# Patient Record
Sex: FEMALE | Race: BLACK OR AFRICAN AMERICAN | Employment: OTHER | URBAN - METROPOLITAN AREA
[De-identification: names, ages, dates, MRNs, and addresses within clinical notes are randomized per-mention and may not be internally consistent; named-entity substitution may affect disease eponyms.]

---

## 2017-01-10 ENCOUNTER — DOCUMENTATION ONLY (OUTPATIENT)
Dept: FAMILY MEDICINE CLINIC | Facility: CLINIC | Age: 67
End: 2017-01-10

## 2017-01-10 ENCOUNTER — PATIENT OUTREACH (OUTPATIENT)
Dept: FAMILY MEDICINE CLINIC | Facility: CLINIC | Age: 67
End: 2017-01-10

## 2017-01-10 NOTE — PROGRESS NOTES
This note will not be viewable in [address] I spoke with the Pt on 1/10/2017. Ms. Gina Nguyen stated that she needed to find somewhere for her and her  because her daughter n law and son stated to them that they needed to be out their home in February. She had stated that she called her insurance to see what she needed to do to get her  in rehab while she was in the hospital during her surgery because he is unable to take care of himself. ( he is paralyzed on his left side) . Mrs. Kramer stated the insurance company told her that she would have to pay out of pocket, she also stated she did not want her son to know what she was doing. Mrs. Jean Claude Hess stated that her son and daughter had sold all of her furniture from her home in [place] and they made her put her home up for sell with she was hospitalized earlier in the year of 2016. She stated she would have not came here if she knew her son was like the way he is. Mrs. Jean Claude Hess was whispering the entire time on the phone as if someone was listening in on her phone conversation. I asked Mrs. Kramer if she needed me to call someone for her? She stated\"  She has a daughter who lives in [facility] that doesn't answer her phone for her.

## 2017-01-10 NOTE — PROGRESS NOTES
This note will not be viewable in [address] Amada Foster nurse informed me(writer) that the patient has been calling the office in  regards to patient's concern for housing. Per Amada, patient and patient's  are currently living with their son and daughter in law. Per Amada, Patient's daughter in law attempted to sell patient's house and furniture in [place]. Per Amada, patient states that patient's son told patient that they have until the end of the month to find a place to stay. Nurse Amada informed me (writer) that patient's son verbally told her (Amada)  that his parents need to move out of patient's son house. LiamOneCubicleward Stahl  informed writer that daughter in law sold all patient's furniture in [place]. Per Amada, patient has an upcoming procedure and the patient is worried about her  who is bed bound and can't care for himself. Kate informed me (writer) that the patient and patient's son called the office today 1/10/17. Per Kate, patient son states that he is aware that her mom has been calling the office. Lisa Solorzano informed me that patient's son told her that he is willing to care for his mom and dad until mother recovers from her upcoming surgery. Pepito Kenzie also informed me that patient's son told her that patients and patient's  needs move out of patient's son's house once patient recovers from surgery. Called Adult MAYKEL Salguero Twice. Left a voice message with office contact information. Received call from patient. Patient verified her identity using her full name and date of birth. Introduce self and role to patient. I asked the patient how she is feeling today and if there is anything I can help her. Patient states that she is okay. Patient states that she is safe. I asked the patient if she needs to be seen sooner or need sooner appointment. Patient states that she will keep her upcoming appt on 1/13/17.  Patient verbalized no other concerns at this time. Notified by Giovani Alex that patient's son walked in the office today. Patient's son was asking if her mother called in the office to apologize. Awaiting call from LEE ANN.

## 2017-01-11 ENCOUNTER — PATIENT OUTREACH (OUTPATIENT)
Dept: FAMILY MEDICINE CLINIC | Facility: CLINIC | Age: 67
End: 2017-01-11

## 2017-01-11 NOTE — PROGRESS NOTES
This note will not be viewable in [address] Received call from [facility]  from [facility]nan [facility] ([phone]) today 1/11/17. Relayed all information received from Nurse Holt,  [address] [place] [address] and [facility]. Opportunity to ask questions was provided. Contact information was provided for future reference, assistance, concerns, or further questions.

## 2017-01-12 DIAGNOSIS — M17.11 PRIMARY OSTEOARTHRITIS OF RIGHT KNEE: ICD-10-CM

## 2017-01-13 DIAGNOSIS — E11.9 CONTROLLED TYPE 2 DIABETES MELLITUS WITHOUT COMPLICATION, WITHOUT LONG-TERM CURRENT USE OF INSULIN (HCC): ICD-10-CM

## 2017-01-16 ENCOUNTER — HOSPITAL ENCOUNTER (OUTPATIENT)
Dept: LAB | Age: 67
Discharge: HOME | End: 2017-01-16
Payer: MEDICARE

## 2017-01-16 DIAGNOSIS — I10 ESSENTIAL HYPERTENSION WITH GOAL BLOOD PRESSURE LESS THAN 140/90: ICD-10-CM

## 2017-01-16 DIAGNOSIS — E11.9 CONTROLLED TYPE 2 DIABETES MELLITUS WITHOUT COMPLICATION, WITHOUT LONG-TERM CURRENT USE OF INSULIN (HCC): ICD-10-CM

## 2017-01-16 LAB
ALBUMIN SERPL BCP-MCNC: 3.7 G/DL (ref 3.4–5)
ALBUMIN/GLOB SERPL: 0.9 {RATIO} (ref 0.8–1.7)
ALP SERPL-CCNC: 58 U/L (ref 45–117)
ALT SERPL-CCNC: 18 U/L (ref 13–56)
ANION GAP BLD CALC-SCNC: 7 MMOL/L (ref 3–18)
AST SERPL W P-5'-P-CCNC: 22 U/L (ref 15–37)
BILIRUB SERPL-MCNC: 0.6 MG/DL (ref 0.2–1)
BUN SERPL-MCNC: 15 MG/DL (ref 7–18)
BUN/CREAT SERPL: 13 (ref 12–20)
CALCIUM SERPL-MCNC: 9.5 MG/DL (ref 8.5–10.1)
CHLORIDE SERPL-SCNC: 100 MMOL/L (ref 100–108)
CO2 SERPL-SCNC: 34 MMOL/L (ref 21–32)
CREAT SERPL-MCNC: 1.13 MG/DL (ref 0.6–1.3)
EST. AVERAGE GLUCOSE BLD GHB EST-MCNC: 171 MG/DL
GLOBULIN SER CALC-MCNC: 4.1 G/DL (ref 2–4)
GLUCOSE SERPL-MCNC: 122 MG/DL (ref 74–99)
HBA1C MFR BLD: 7.6 % (ref 4.2–5.6)
POTASSIUM SERPL-SCNC: 3.9 MMOL/L (ref 3.5–5.5)
PROT SERPL-MCNC: 7.8 G/DL (ref 6.4–8.2)
SODIUM SERPL-SCNC: 141 MMOL/L (ref 136–145)

## 2017-01-16 PROCEDURE — 36415 COLL VENOUS BLD VENIPUNCTURE: CPT | Performed by: PHYSICIAN ASSISTANT

## 2017-01-16 PROCEDURE — 80053 COMPREHEN METABOLIC PANEL: CPT | Performed by: PHYSICIAN ASSISTANT

## 2017-01-16 PROCEDURE — 83036 HEMOGLOBIN GLYCOSYLATED A1C: CPT | Performed by: PHYSICIAN ASSISTANT

## 2017-01-19 ENCOUNTER — TELEPHONE (OUTPATIENT)
Dept: FAMILY MEDICINE CLINIC | Facility: CLINIC | Age: 67
End: 2017-01-19

## 2017-01-19 DIAGNOSIS — M23.91 ARTICULAR CARTILAGE DISORDER OF RIGHT KNEE: ICD-10-CM

## 2017-01-19 RX ORDER — TRAMADOL HYDROCHLORIDE 50 MG/1
50 TABLET ORAL
Qty: 120 TAB | Refills: 0 | OUTPATIENT
Start: 2017-01-19

## 2017-01-19 NOTE — TELEPHONE ENCOUNTER
----- Message from Prabha Zapata, [address] sent at 1/17/2017  7:49 AM EST -----  These are labs for patient you see next week

## 2017-01-19 NOTE — TELEPHONE ENCOUNTER
Spoke with Donya Lopez, Verified 2 patient identifiers. Spoke with patient in regards to lab results. Relayed Doctor's notes.   Patient acknowledges understanding and voices no further questions or concerns at this time

## 2017-01-20 NOTE — TELEPHONE ENCOUNTER
Patient has been getting this medication from Dr Mckay Jean and they called his office yesterday for a refill and was told that she needed to be seen again prior to any refills. Ashley's office LMOVM telling patient to call for an appointment.

## 2017-01-20 NOTE — TELEPHONE ENCOUNTER
Called left message that her pharmacy sent of a request for her tramadol and at this time Dr. Reji Salazar has refused to refill it because she needs an appointment left the number for her to call to set it up at [facility]

## 2017-01-20 NOTE — TELEPHONE ENCOUNTER
Patient's son Long Flores is calling in for a refill of Ultram. Dr. Sandrine Portillo DID NOT prescribe this but she is completely out and no longer under the care of the prescriber. Patient's son Mr. Johnie Prader can be reached at [phone]. If called in please call in to [facility] on [address] [phone].

## 2017-01-23 NOTE — TELEPHONE ENCOUNTER
Rx pended to Kelsea Lee PA-C and called to pharmacy. Called patient and advised this was completed. Mr. Tanya Jeffery states the patient has been quite mean and aggressive lately and has a follow up appointment with her PCP on 1/26/17. He also states that she has decided to move back to [place] and is opting to have her surgery done there instead.

## 2017-01-24 RX ORDER — TRAMADOL HYDROCHLORIDE 50 MG/1
TABLET ORAL
Qty: 60 TAB | Refills: 0 | Status: SHIPPED | OUTPATIENT
Start: 2017-01-24 | End: 2017-01-25

## 2017-01-25 ENCOUNTER — OFFICE VISIT (OUTPATIENT)
Dept: FAMILY MEDICINE CLINIC | Facility: CLINIC | Age: 67
End: 2017-01-25

## 2017-01-25 VITALS
DIASTOLIC BLOOD PRESSURE: 78 MMHG | OXYGEN SATURATION: 98 % | RESPIRATION RATE: 17 BRPM | BODY MASS INDEX: 31.58 KG/M2 | HEART RATE: 59 BPM | HEIGHT: 64 IN | SYSTOLIC BLOOD PRESSURE: 128 MMHG | TEMPERATURE: 96.6 F | WEIGHT: 185 LBS

## 2017-01-25 DIAGNOSIS — E66.9 OBESITY, CLASS I, BMI 30-34.9: ICD-10-CM

## 2017-01-25 DIAGNOSIS — E11.9 CONTROLLED TYPE 2 DIABETES MELLITUS WITHOUT COMPLICATION, WITHOUT LONG-TERM CURRENT USE OF INSULIN (HCC): Primary | ICD-10-CM

## 2017-01-25 DIAGNOSIS — M19.90 ARTHRITIS: ICD-10-CM

## 2017-01-25 DIAGNOSIS — I10 ESSENTIAL HYPERTENSION WITH GOAL BLOOD PRESSURE LESS THAN 140/90: ICD-10-CM

## 2017-01-25 DIAGNOSIS — Z13.31 SCREENING FOR DEPRESSION: ICD-10-CM

## 2017-01-25 RX ORDER — METFORMIN HYDROCHLORIDE 500 MG/1
750 TABLET ORAL 2 TIMES DAILY WITH MEALS
Qty: 180 TAB | Refills: 3 | Status: SHIPPED | OUTPATIENT
Start: 2017-01-25

## 2017-01-25 NOTE — PROGRESS NOTES
Internal Medicine Progress Note    Today's Date:  2017   Patient:  Kip Cabrera  Patient :  1950    Subjective:     Chief Complaint   Patient presents with    Hypertension    Diabetes      Diabetes mellitus  This is a chronic problem. BS is not at goal. Pt is on metformin. Pt is not on aspirin due to h/o GI bleed.  Pt is on a statin.       Hypertension   This is a chronic problem. BP is at goal. Pt takes norvasc, toprol and valsartan. Pt reports compliance with these medications. Bilateral knee arthritis  This is a chronic problem. This is not at goal. Pt is scheduled for knee replacement in February. Pt takes tramadol. Gout   This is a chronic problem. This is not controlled. This is present in her right hand. Pt is taking colchicine and uloric. Allopurinol caused swelling.      Past Medical History   Diagnosis Date    Advance directive discussed with patient 2016    Aspirin intolerance 2017    Asthma      bronchitis    Cancer ([address] 1998     Left breast cancer    Cervical radiculopathy     Cigarette nicotine dependence in remission 2016    DDD (degenerative disc disease), lumbar     Degeneration of cervical intervertebral disc     Degeneration of thoracic intervertebral disc     Diabetes mellitus ([address]      type 2    Diabetes mellitus type 2, controlled ([address] 2016    Essential hypertension with goal blood pressure less than 140/90 8/10/2016    GERD (gastroesophageal reflux disease)     GI bleed 16    Gout     H. pylori infection 2016    H/O: GI bleed 2017    Hammer toe of left foot     Hypercholesterolemia     Hypertension     Lumbago with sciatica     Lumbar radiculopathy     Malignant neoplasm of left female breast ([address] 2016    Mixed incontinence 8/10/2016    Obesity, Class I, BMI 30-34.9 2016    osteoarthritis     Osteoarthrosis, generalized, involving multiple sites     Osteoporosis     Paronychia of finger     Primary insomnia 8/10/2016    Sciatica of left side     Scoliosis     Segmental and somatic dysfunction of lumbar region     Segmental and somatic dysfunction of pelvic region     Urinary tract infection     Vertigo     Vitamin B12 deficiency     Vitamin D deficiency     Wound infection      Past Surgical History   Procedure Laterality Date    Hx tubal ligation Bilateral     Hx mastectomy Left 1998      reports that she quit smoking about 41 years ago. She has a 1.25 pack-year smoking history. She has never used smokeless tobacco. She reports that she does not drink alcohol or use illicit drugs. Family History   Problem Relation Age of Onset    Hypertension Mother     Hypertension Father      Allergies   Allergen Reactions    Nsaids (Non-Steroidal Anti-Inflammatory Drug) Other (comments)     GI Bleed    Aspirin Other (comments)     Gi bleed       Review of Systems   Positives in bold  CV:      chest pain, palpitations  PULM:  SOB, wheezing, cough, sputum production    Current Outpatient Meds and Allergies     Current Outpatient Prescriptions on File Prior to Visit   Medication Sig Dispense Refill    traMADol (ULTRAM) 50 mg tablet Take 1 Tab by mouth every six (6) hours as needed for Pain. Max Daily Amount: 200 mg. 120 Tab 0    CRANBERRY FRUIT EXTRACT (CRANBERRY CONCENTRATE PO) Take 2 Tabs by mouth daily.  amLODIPine (NORVASC) 10 mg tablet Take 1 Tab by mouth daily. 90 Tab 3    valsartan (DIOVAN) 160 mg tablet Take 1 Tab by mouth every evening. 90 Tab 3    metoprolol succinate (TOPROL-XL) 50 mg XL tablet Take 1 Tab by mouth daily. 90 Tab 3    febuxostat (ULORIC) 40 mg tab tablet Take 1 Tab by mouth daily. 90 Tab 3    fexofenadine (ALLEGRA) 180 mg tablet Take 1 Tab by mouth daily. (Patient taking differently: Take 180 mg by mouth daily as needed.) 90 Tab 3    colchicine 0.6 mg tablet Take 1 Tab by mouth daily.  90 Tab 0    oxybutynin chloride XL (DITROPAN XL) 10 mg CR tablet Take 1 Tab by mouth daily. 90 Tab 3    melatonin 3 mg tablet Take 1 Tab by mouth nightly. 90 Tab 3    pravastatin (PRAVACHOL) 20 mg tablet Take 1 Tab by mouth nightly. 90 Tab 3    pantoprazole (PROTONIX) 40 mg tablet Take 40 mg by mouth daily as needed.  senna (SENOKOT) 8.6 mg tablet Take 2 Tabs by mouth nightly. No current facility-administered medications on file prior to visit. Allergies   Allergen Reactions    Nsaids (Non-Steroidal Anti-Inflammatory Drug) Other (comments)     GI Bleed    Aspirin Other (comments)     Gi bleed       Objective:     VS:    Visit Vitals    /78 (BP 1 Location: Right arm, BP Patient Position: Sitting)  Comment (BP 1 Location): RIGHT    Pulse (!) 59    Temp 96.6 °F (35.9 °C) (Oral)    Resp 17    Ht 5' 4\" (1.626 m)    Wt 185 lb (83.9 kg)    SpO2 98%    BMI 31.76 kg/m2     General:   Well-nourished, well-groomed, pleasant, alert, in no acute distress  Head:  Normocephalic, atraumatic  Ears:  External ears WNL  Nose:  External nares WNL  Psych:  No pressured speech, no abnormal thought content    Hospital Outpatient Visit on 01/16/2017   Component Date Value Ref Range Status    Hemoglobin A1c 01/16/2017 7.6* 4.2 - 5.6 % Final    Comment: (NOTE)  HbA1C Interpretive Ranges  <5.7              Normal  5.7 - 6.4         Consider Prediabetes  >6.5              Consider Diabetes      Est. average glucose 01/16/2017 171  mg/dL Final    Comment: (NOTE)  The eAG should be interpreted with patient characteristics in mind   since ethnicity, interindividual differences, red cell lifespan,   variation in rates of glycation, etc. may affect the validity of the   calculation.       Sodium 01/16/2017 141  136 - 145 mmol/L Final    Potassium 01/16/2017 3.9  3.5 - 5.5 mmol/L Final    Chloride 01/16/2017 100  100 - 108 mmol/L Final    CO2 01/16/2017 34* 21 - 32 mmol/L Final    Anion gap 01/16/2017 7  3.0 - 18 mmol/L Final    Glucose 01/16/2017 122* 74 - 99 mg/dL Final    BUN 01/16/2017 15  7.0 - 18 MG/DL Final    Creatinine 01/16/2017 1.13  0.6 - 1.3 MG/DL Final    BUN/Creatinine ratio 01/16/2017 13  12 - 20   Final    GFR est AA 01/16/2017 58* >60 ml/min/1.73m2 Final    GFR est non-AA 01/16/2017 48* >60 ml/min/1.73m2 Final    Comment: (NOTE)  Estimated GFR is calculated using the Modification of Diet in Renal   Disease (MDRD) Study equation, reported for both  Americans   (GFRAA) and non- Americans (GFRNA), and normalized to 1.73m2   body surface area. The physician must decide which value applies to   the patient. The MDRD study equation should only be used in   individuals age 25 or older. It has not been validated for the   following: pregnant women, patients with serious comorbid conditions,   or on certain medications, or persons with extremes of body size,   muscle mass, or nutritional status.  Calcium 01/16/2017 9.5  8.5 - 10.1 MG/DL Final    Bilirubin, total 01/16/2017 0.6  0.2 - 1.0 MG/DL Final    ALT 01/16/2017 18  13 - 56 U/L Final    AST 01/16/2017 22  15 - 37 U/L Final    Alk.  phosphatase 01/16/2017 58  45 - 117 U/L Final    Protein, total 01/16/2017 7.8  6.4 - 8.2 g/dL Final    Albumin 01/16/2017 3.7  3.4 - 5.0 g/dL Final    Globulin 01/16/2017 4.1* 2.0 - 4.0 g/dL Final    A-G Ratio 01/16/2017 0.9  0.8 - 1.7   Final   Hospital Outpatient Visit on 12/16/2016   Component Date Value Ref Range Status    Ventricular Rate 12/16/2016 56  BPM Final    Atrial Rate 12/16/2016 56  BPM Final    P-R Interval 12/16/2016 184  ms Final    QRS Duration 12/16/2016 84  ms Final    Q-T Interval 12/16/2016 430  ms Final    QTC Calculation (Bezet) 12/16/2016 414  ms Final    Calculated P Axis 12/16/2016 77  degrees Final    Calculated R Axis 12/16/2016 20  degrees Final    Calculated T Axis 12/16/2016 97  degrees Final    Diagnosis 12/16/2016    Final                    Value:Sinus bradycardia  Nonspecific T wave abnormality  Abnormal ECG  No previous ECGs available  Confirmed by Chica Espinoza ([id number]) on 12/16/2016 4:08:53 PM     Hospital Outpatient Visit on 12/16/2016   Component Date Value Ref Range Status    Prothrombin time 12/16/2016 12.6  11.5 - 15.2 sec Final    INR 12/16/2016 1.0  0.8 - 1.2   Final    Comment:            INR Therapeutic Ranges         (on stable oral anticoagulant):     INDICATION                INR  DVT/PE/Atrial Fib          2.0-3.0  MI/Mechanical Heart Valve  2.5-3.5      aPTT 12/16/2016 28.0  23.0 - 36.4 SEC Final    WBC 12/16/2016 5.0  4.6 - 13.2 K/uL Final    RBC 12/16/2016 4.95  4.20 - 5.30 M/uL Final    HGB 12/16/2016 14.0  12.0 - 16.0 g/dL Final    HCT 12/16/2016 43.3  35.0 - 45.0 % Final    MCV 12/16/2016 87.5  74.0 - 97.0 FL Final    MCH 12/16/2016 28.3  24.0 - 34.0 PG Final    MCHC 12/16/2016 32.3  31.0 - 37.0 g/dL Final    RDW 12/16/2016 16.8* 11.6 - 14.5 % Final    PLATELET 07/10/7283 699  135 - 420 K/uL Final    MPV 12/16/2016 11.1  9.2 - 11.8 FL Final    NEUTROPHILS 12/16/2016 46  40 - 73 % Final    LYMPHOCYTES 12/16/2016 43  21 - 52 % Final    MONOCYTES 12/16/2016 8  3 - 10 % Final    EOSINOPHILS 12/16/2016 3  0 - 5 % Final    BASOPHILS 12/16/2016 0  0 - 2 % Final    ABS. NEUTROPHILS 12/16/2016 2.2  1.8 - 8.0 K/UL Final    ABS. LYMPHOCYTES 12/16/2016 2.2  0.9 - 3.6 K/UL Final    ABS. MONOCYTES 12/16/2016 0.4  0.05 - 1.2 K/UL Final    ABS. EOSINOPHILS 12/16/2016 0.2  0.0 - 0.4 K/UL Final    ABS.  BASOPHILS 12/16/2016 0.0  0.0 - 0.06 K/UL Final    DF 12/16/2016 AUTOMATED    Final    Sodium 12/16/2016 142  136 - 145 mmol/L Final    Potassium 12/16/2016 4.2  3.5 - 5.5 mmol/L Final    Chloride 12/16/2016 100  100 - 108 mmol/L Final    CO2 12/16/2016 34* 21 - 32 mmol/L Final    Anion gap 12/16/2016 8  3.0 - 18 mmol/L Final    Glucose 12/16/2016 132* 74 - 99 mg/dL Final    BUN 12/16/2016 17  7.0 - 18 MG/DL Final    Creatinine 12/16/2016 0.99  0.6 - 1.3 MG/DL Final    BUN/Creatinine ratio 12/16/2016 17  12 - 20   Final    GFR est AA 12/16/2016 >60  >60 ml/min/1.73m2 Final    GFR est non-AA 12/16/2016 56* >60 ml/min/1.73m2 Final    Comment: (NOTE)  Estimated GFR is calculated using the Modification of Diet in Renal   Disease (MDRD) Study equation, reported for both  Americans   (GFRAA) and non- Americans (GFRNA), and normalized to 1.73m2   body surface area. The physician must decide which value applies to   the patient. The MDRD study equation should only be used in   individuals age 25 or older. It has not been validated for the   following: pregnant women, patients with serious comorbid conditions,   or on certain medications, or persons with extremes of body size,   muscle mass, or nutritional status.  Calcium 12/16/2016 9.2  8.5 - 10.1 MG/DL Final    Bilirubin, total 12/16/2016 0.6  0.2 - 1.0 MG/DL Final    ALT 12/16/2016 19  13 - 56 U/L Final    AST 12/16/2016 22  15 - 37 U/L Final    Alk.  phosphatase 12/16/2016 74  45 - 117 U/L Final    Protein, total 12/16/2016 8.0  6.4 - 8.2 g/dL Final    Albumin 12/16/2016 3.7  3.4 - 5.0 g/dL Final    Globulin 12/16/2016 4.3* 2.0 - 4.0 g/dL Final    A-G Ratio 12/16/2016 0.9  0.8 - 1.7   Final    Color 12/16/2016 YELLOW    Final    Appearance 12/16/2016 CLEAR    Final    Specific gravity 12/16/2016 1.010  1.005 - 1.030   Final    pH (UA) 12/16/2016 7.0  5.0 - 8.0   Final    Protein 12/16/2016 NEGATIVE   NEG mg/dL Final    Glucose 12/16/2016 NEGATIVE   NEG mg/dL Final    Ketone 12/16/2016 NEGATIVE   NEG mg/dL Final    Bilirubin 12/16/2016 NEGATIVE   NEG   Final    Blood 12/16/2016 NEGATIVE   NEG   Final    Urobilinogen 12/16/2016 0.2  0.2 - 1.0 EU/dL Final    Nitrites 12/16/2016 NEGATIVE   NEG   Final    Leukocyte Esterase 12/16/2016 TRACE* NEG   Final    Special Requests: 12/16/2016 NO SPECIAL REQUESTS    Final    Culture result: 12/16/2016 NO GROWTH 2 DAYS    Final    WBC 12/16/2016 0 to 3  0 - 4 /hpf Final    RBC 12/16/2016 0  0 - 5 /hpf Final    Epithelial cells 12/16/2016 1+  0 - 5 /lpf Final    Bacteria 12/16/2016 NEGATIVE   NEG /hpf Final    Other: 12/16/2016 STARCH GRANULES 2+   Final   Hospital Outpatient Visit on 11/14/2016   Component Date Value Ref Range Status    Hemoglobin A1c 11/14/2016 8.4* 4.2 - 5.6 % Final    Comment: (NOTE)  HbA1C Interpretive Ranges  <5.7              Normal  5.7 - 6.4         Consider Prediabetes  >6.5              Consider Diabetes      Est. average glucose 11/14/2016 194  mg/dL Final    Comment: (NOTE)  The eAG should be interpreted with patient characteristics in mind   since ethnicity, interindividual differences, red cell lifespan,   variation in rates of glycation, etc. may affect the validity of the   calculation.  Uric acid 11/14/2016 6.5  2.6 - 7.2 MG/DL Final     Assessment/Plan & Orders:         ICD-10-CM ICD-9-CM    1. Controlled type 2 diabetes mellitus without complication, without long-term current use of insulin (HCC) E11.9 250.00 metFORMIN (GLUCOPHAGE) 500 mg tablet   2. Essential hypertension with goal blood pressure less than 140/90 I10 401.9    3. Arthritis M19.90 716.90    4. Obesity, Class I, BMI 30-34.9 E66.9 278.00    5. Screening for depression Z13.89 V79.0 [facility] 68     Healthy lifestyle has been encouraged including avoidance of tobacco, limiting or avoiding alcohol intake, heart healthy diet which is low in cholesterol and saturated fat and contains fresh fruits, vegetables and whole grains and fiber, regular exercise with goals of 20-30 minutes 3-5 days weekly and maintaining an optimal BMI. Request shot records  Pt to get eye exam. Eye form given    ASA or antiplatelet therapy not prescibed for patient reasons. Follow-up Disposition:  Return in about 4 weeks (around 2/22/2017) for Follow up hypertension, Follow up diabetes mellitus, Follow up hyperlipidemia. *Patient verbalized understanding and agreement with the plan.   Patient was given an after-visit summary. Gee Cox.  [address], MD - Internal Medicine  1/26/2017, 12:25 PM  [address]ilda Kennedy [address]  [address]  [place], [address]  Phone [phone]  Fax [phone]

## 2017-01-25 NOTE — PROGRESS NOTES
Mitzi Gan is a 79 y.o. female presents to office for DM and HTN. Room 3      1. Have you been to the ER, urgent care clinic or hospitalized since your last visit? NO  2. Have you seen any other providers outside of [facility] since your last visit? NO  3. Have you had a Flu shot this year?  YES       Health Maintenance items with a due date reviewed with patient:  Health Maintenance Due   Topic Date Due    EYE EXAM RETINAL OR DILATED Q1  01/25/1960    Pneumococcal 65+ High/Highest Risk (1 of 2 - PCV13) 01/25/2015

## 2017-01-25 NOTE — MR AVS SNAPSHOT
Visit Information Date & Time Provider Department Dept. Phone Encounter #  
 1/25/2017  1:30 PM Joyce Jin MD Hilda Kennedy  [phone] [id number] Follow-up Instructions Return in about 4 weeks (around 2/22/2017) for Follow up hypertension, Follow up diabetes mellitus, Follow up hyperlipidemia. Your Appointments 2/9/2017  8:30 AM  
LAB with Joyce Jin MD  
[facility]) Appt Note: lab [address] [facility] [address] [id number]  
[phone]  
  
   
 [phone]  
  
    
 2/9/2017  9:30 AM  
HISTORY AND PHYSICAL with Tully Severin, PA-C  
[facility] Appt Note: SX 2/14/2017 RIGHT TOTAL KNEE ARTHROPLASTY NEEDS FILMS  
 [address] [place] [address], [address] [place] [address]  
[phone]  
  
   
 [address], [address] [address]  
  
    
 2/15/2017  8:30 AM  
Follow Up with Joyce Jin MD  
[facility]) Appt Note: Return in about 3 months (around 2/14/2017) for Follow up hypertension, Follow up hyperlipidemia, Follow up diabetes mellitus. [address] [facility] [address]  
[phone]  
  
   
 [phone]  
  
    
 3/2/2017  9:45 AM  
POST OP with Tully Severin, PA-C  
[facility] Appt Note: SX 2/14/2017 RIGHT TOTAL KNEE ARTHROPLASTY  
 [address] [place] [address], [address] [place] [address]  
[phone]  
  
   
 [address], [address] [address] Upcoming Health Maintenance Date Due  
 EYE EXAM RETINAL OR DILATED Q1 1/25/1960 Pneumococcal 65+ High/Highest Risk (1 of 2 - PCV13) 1/25/2015 HEMOGLOBIN A1C Q6M 7/16/2017 LIPID PANEL Q1 8/2/2017 MICROALBUMIN Q1 8/10/2017 FOBT Q 1 YEAR AGE 50-75 8/17/2017 MEDICARE YEARLY EXAM 8/18/2017 FOOT EXAM Q1 9/19/2017 BREAST CANCER SCRN MAMMOGRAM 2/28/2018 GLAUCOMA SCREENING Q2Y 3/1/2018 DTaP/Tdap/Td series (2 - Td) 8/17/2026 Allergies as of 1/25/2017  Review Complete On: 1/25/2017 By: Francisco Melo MD  
  
 Severity Noted Reaction Type Reactions Nsaids (Non-steroidal Anti-inflammatory Drug) High 08/02/2016   Systemic Other (comments) GI Bleed Aspirin  08/02/2016   Systemic Other (comments) Gi bleed Current Immunizations  Never Reviewed No immunizations on file. Not reviewed this visit You Were Diagnosed With   
  
 Codes Comments Controlled type 2 diabetes mellitus without complication, without long-term current use of insulin ([facility]    -  Primary ICD-10-CM: E11.9 ICD-9-CM: 250.00 Essential hypertension with goal blood pressure less than 140/90     ICD-10-CM: I10 
ICD-9-CM: 401.9 Arthritis     ICD-10-CM: M19.90 ICD-9-CM: 716.90 Obesity, Class I, BMI 30-34.9     ICD-10-CM: E66.9 ICD-9-CM: 278.00 Vitals BP Pulse Temp Resp Height(growth percentile) Weight(growth percentile) 128/78 (BP 1 Location: Right arm, BP Patient Position: Sitting) (!) 59 96.6 °F (35.9 °C) (Oral) 17 5' 4\" (1.626 m) 185 lb (83.9 kg) SpO2 BMI OB Status Smoking Status 98% 31.76 kg/m2 Menopause Former Smoker BMI and BSA Data Body Mass Index Body Surface Area 31.76 kg/m 2 1.95 m 2 Preferred Pharmacy Pharmacy Name Phone [facility] [place], [address] [place] [address] [phone] Your Updated Medication List  
  
   
This list is accurate as of: 1/25/17  2:27 PM.  Always use your most recent med list. amLODIPine 10 mg tablet Commonly known as:  Hinton Toño Take 1 Tab by mouth daily. colchicine 0.6 mg tablet Take 1 Tab by mouth daily. CRANBERRY CONCENTRATE PO Take 2 Tabs by mouth daily. febuxostat 40 mg Tab tablet Commonly known as:  Adrienne Liter Take 1 Tab by mouth daily. fexofenadine 180 mg tablet Commonly known as:  Rella Soulier Take 1 Tab by mouth daily. indomethacin 50 mg capsule Commonly known as:  INDOCIN  
TAKE ONE CAPSULE BY MOUTH THREE TIMES DAILY  
  
 melatonin 3 mg tablet Take 1 Tab by mouth nightly. metFORMIN 500 mg tablet Commonly known as:  GLUCOPHAGE Take 1.5 Tabs by mouth two (2) times daily (with meals). metoprolol succinate 50 mg XL tablet Commonly known as:  TOPROL-XL Take 1 Tab by mouth daily. oxybutynin chloride XL 10 mg CR tablet Commonly known as:  DITROPAN XL Take 1 Tab by mouth daily. pantoprazole 40 mg tablet Commonly known as:  PROTONIX Take 40 mg by mouth daily as needed. pravastatin 20 mg tablet Commonly known as:  PRAVACHOL Take 1 Tab by mouth nightly. senna 8.6 mg tablet Commonly known as:  [employer] Take 2 Tabs by mouth nightly. traMADol 50 mg tablet Commonly known as:  ULTRAM  
Take 1 Tab by mouth every six (6) hours as needed for Pain. Max Daily Amount: 200 mg.  
  
 valsartan 160 mg tablet Commonly known as:  DIOVAN Take 1 Tab by mouth every evening. Prescriptions Sent to Pharmacy Refills  
 metFORMIN (GLUCOPHAGE) 500 mg tablet 3 Sig: Take 1.5 Tabs by mouth two (2) times daily (with meals). Class: Normal  
 [facility] [address] #: [phone] Route: Oral  
  
Follow-up Instructions Return in about 4 weeks (around 2/22/2017) for Follow up hypertension, Follow up diabetes mellitus, Follow up hyperlipidemia. Introducing [facility]! Dear Tasha Clipper: Thank you for requesting a Asurint account. Our records indicate that you already have an active Asurint account. You can access your account anytime at [email or web address] Did you know that you can access your hospital and ER discharge instructions at any time in Myngle? You can also review all of your test results from your hospital stay or ER visit. Additional Information If you have questions, please visit the Frequently Asked Questions section of the Myngle website at [email or web address] Remember, Myngle is NOT to be used for urgent needs. For medical emergencies, dial 911. Now available from your iPhone and Android! Please provide this summary of care documentation to your next provider. Your primary care clinician is listed as Remy Carrington. If you have any questions after today's visit, please call [phone].

## 2017-01-26 ENCOUNTER — TELEPHONE (OUTPATIENT)
Dept: FAMILY MEDICINE CLINIC | Facility: CLINIC | Age: 67
End: 2017-01-26

## 2017-01-26 PROBLEM — Z78.9 ASPIRIN INTOLERANCE: Status: ACTIVE | Noted: 2017-01-26

## 2017-01-26 PROBLEM — Z87.19 H/O: GI BLEED: Status: ACTIVE | Noted: 2017-01-26

## 2017-01-26 NOTE — PATIENT INSTRUCTIONS
Arthritis: Care Instructions  Your Care Instructions  Arthritis, also called osteoarthritis, is a breakdown of the cartilage that cushions your joints. When the cartilage wears down, your bones rub against each other. This causes pain and stiffness. Many people have some arthritis as they age. Arthritis most often affects the joints of the spine, hands, hips, knees, or feet. You can take simple measures to protect your joints, ease your pain, and help you stay active. Follow-up care is a key part of your treatment and safety. Be sure to make and go to all appointments, and call your doctor if you are having problems. It's also a good idea to know your test results and keep a list of the medicines you take. How can you care for yourself at home? · Stay at a healthy weight. Being overweight puts extra strain on your joints. · Talk to your doctor or physical therapist about exercises that will help ease joint pain. ¨ Stretch. You may enjoy gentle forms of yoga to help keep your joints and muscles flexible. ¨ Walk instead of jog. Other types of exercise that are less stressful on the joints include riding a bicycle, swimming, or water exercise. ¨ Lift weights. Strong muscles help reduce stress on your joints. Stronger thigh muscles, for example, take some of the stress off of the knees and hips. Learn the right way to lift weights so you do not make joint pain worse. · Take your medicines exactly as prescribed. Call your doctor if you think you are having a problem with your medicine. · Take pain medicines exactly as directed. ¨ If the doctor gave you a prescription medicine for pain, take it as prescribed. ¨ If you are not taking a prescription pain medicine, ask your doctor if you can take an over-the-counter medicine. · Use a cane, crutch, walker, or another device if you need help to get around. These can help rest your joints.  You also can use other things to make life easier, such as a higher toilet seat and padded handles on kitchen utensils. · Do not sit in low chairs, which can make it hard to get up. · Put heat or cold on your sore joints as needed. Use whichever helps you most. You also can take turns with hot and cold packs. ¨ Apply heat 2 or 3 times a day for 20 to 30 minutesusing a heating pad, hot shower, or hot packto relieve pain and stiffness. ¨ Put ice or a cold pack on your sore joint for 10 to 20 minutes at a time. Put a thin cloth between the ice and your skin. When should you call for help? Call your doctor now or seek immediate medical care if:  · You have sudden swelling, warmth, or pain in any joint. · You have joint pain and a fever or rash. · You have such bad pain that you cannot use a joint. Watch closely for changes in your health, and be sure to contact your doctor if:  · You have mild joint symptoms that continue even with more than 6 weeks of care at home. · You have stomach pain or other problems with your medicine. Where can you learn more? Go to http://tatiana-harvinder.info/. Enter V424 in the search box to learn more about \"Arthritis: Care Instructions. \"  Current as of: February 24, 2016  Content Version: 11.1  © 2228-6320 [employer]. Care instructions adapted under license by [employer] (which disclaims liability or warranty for this information). If you have questions about a medical condition or this instruction, always ask your healthcare professional. [place] [address] any warranty or liability for your use of this information.

## 2017-01-26 NOTE — TELEPHONE ENCOUNTER
Spoke with pt in regards to lab results and medication change. Relayed 's notes. Pt acknowledges understanding and voices no concerns at this time.

## 2017-02-01 DIAGNOSIS — M17.11 PRIMARY OSTEOARTHRITIS OF RIGHT KNEE: ICD-10-CM

## 2017-02-01 DIAGNOSIS — Z01.818 PREOPERATIVE TESTING: Primary | ICD-10-CM

## 2017-02-08 ENCOUNTER — HOSPITAL ENCOUNTER (OUTPATIENT)
Dept: PREADMISSION TESTING | Age: 67
Discharge: HOME | End: 2017-02-08
Payer: MEDICARE

## 2017-02-08 ENCOUNTER — HOSPITAL ENCOUNTER (OUTPATIENT)
Dept: GENERAL RADIOLOGY | Age: 67
Discharge: HOME | End: 2017-02-08
Payer: MEDICARE

## 2017-02-08 DIAGNOSIS — Z01.818 PREOPERATIVE TESTING: ICD-10-CM

## 2017-02-08 DIAGNOSIS — M17.11 PRIMARY OSTEOARTHRITIS OF RIGHT KNEE: ICD-10-CM

## 2017-02-08 LAB
ABO + RH BLD: NORMAL
ALBUMIN SERPL BCP-MCNC: 3.6 G/DL (ref 3.4–5)
ANION GAP BLD CALC-SCNC: 8 MMOL/L (ref 3–18)
APPEARANCE UR: CLEAR
APTT PPP: 26.9 SEC (ref 23–36.4)
BASOPHILS # BLD AUTO: 0 K/UL (ref 0–0.1)
BASOPHILS # BLD: 0 % (ref 0–2)
BILIRUB UR QL: NEGATIVE
BLOOD GROUP ANTIBODIES SERPL: NORMAL
BUN SERPL-MCNC: 23 MG/DL (ref 7–18)
BUN/CREAT SERPL: 21 (ref 12–20)
CALCIUM SERPL-MCNC: 9.7 MG/DL (ref 8.5–10.1)
CHLORIDE SERPL-SCNC: 101 MMOL/L (ref 100–108)
CO2 SERPL-SCNC: 32 MMOL/L (ref 21–32)
COLOR UR: YELLOW
CREAT SERPL-MCNC: 1.09 MG/DL (ref 0.6–1.3)
DIFFERENTIAL METHOD BLD: ABNORMAL
EOSINOPHIL # BLD: 0.1 K/UL (ref 0–0.4)
EOSINOPHIL NFR BLD: 3 % (ref 0–5)
EPITH CASTS URNS QL MICRO: ABNORMAL /LPF (ref 0–5)
ERYTHROCYTE [DISTWIDTH] IN BLOOD BY AUTOMATED COUNT: 14.7 % (ref 11.6–14.5)
EST. AVERAGE GLUCOSE BLD GHB EST-MCNC: 160 MG/DL
GLUCOSE SERPL-MCNC: 120 MG/DL (ref 74–99)
GLUCOSE UR STRIP.AUTO-MCNC: NEGATIVE MG/DL
HBA1C MFR BLD: 7.2 % (ref 4.2–5.6)
HCT VFR BLD AUTO: 39.8 % (ref 35–45)
HGB BLD-MCNC: 13.1 G/DL (ref 12–16)
HGB UR QL STRIP: NEGATIVE
INR PPP: 1 (ref 0.8–1.2)
KETONES UR QL STRIP.AUTO: NEGATIVE MG/DL
LEUKOCYTE ESTERASE UR QL STRIP.AUTO: ABNORMAL
LYMPHOCYTES # BLD AUTO: 37 % (ref 21–52)
LYMPHOCYTES # BLD: 1.7 K/UL (ref 0.9–3.6)
MCH RBC QN AUTO: 29.6 PG (ref 24–34)
MCHC RBC AUTO-ENTMCNC: 32.9 G/DL (ref 31–37)
MCV RBC AUTO: 90 FL (ref 74–97)
MONOCYTES # BLD: 0.6 K/UL (ref 0.05–1.2)
MONOCYTES NFR BLD AUTO: 13 % (ref 3–10)
NEUTS SEG # BLD: 2.1 K/UL (ref 1.8–8)
NEUTS SEG NFR BLD AUTO: 47 % (ref 40–73)
NITRITE UR QL STRIP.AUTO: NEGATIVE
PH UR STRIP: 6.5 [PH] (ref 5–8)
PLATELET # BLD AUTO: 190 K/UL (ref 135–420)
PMV BLD AUTO: 11.7 FL (ref 9.2–11.8)
POTASSIUM SERPL-SCNC: 4.3 MMOL/L (ref 3.5–5.5)
PROT UR STRIP-MCNC: NEGATIVE MG/DL
PROTHROMBIN TIME: 12.5 SEC (ref 11.5–15.2)
RBC # BLD AUTO: 4.42 M/UL (ref 4.2–5.3)
RBC #/AREA URNS HPF: ABNORMAL /HPF (ref 0–5)
SODIUM SERPL-SCNC: 141 MMOL/L (ref 136–145)
SP GR UR REFRACTOMETRY: 1.01 (ref 1–1.03)
SPECIMEN EXP DATE BLD: NORMAL
UROBILINOGEN UR QL STRIP.AUTO: 0.2 EU/DL (ref 0.2–1)
WBC # BLD AUTO: 4.6 K/UL (ref 4.6–13.2)
WBC URNS QL MICRO: ABNORMAL /HPF (ref 0–4)
YEAST URNS QL MICRO: ABNORMAL

## 2017-02-08 PROCEDURE — 85610 PROTHROMBIN TIME: CPT | Performed by: ORTHOPAEDIC SURGERY

## 2017-02-08 PROCEDURE — 81001 URINALYSIS AUTO W/SCOPE: CPT | Performed by: ORTHOPAEDIC SURGERY

## 2017-02-08 PROCEDURE — 83036 HEMOGLOBIN GLYCOSYLATED A1C: CPT | Performed by: ORTHOPAEDIC SURGERY

## 2017-02-08 PROCEDURE — 80048 BASIC METABOLIC PNL TOTAL CA: CPT | Performed by: ORTHOPAEDIC SURGERY

## 2017-02-08 PROCEDURE — 85025 COMPLETE CBC W/AUTO DIFF WBC: CPT | Performed by: ORTHOPAEDIC SURGERY

## 2017-02-08 PROCEDURE — 85730 THROMBOPLASTIN TIME PARTIAL: CPT | Performed by: ORTHOPAEDIC SURGERY

## 2017-02-08 PROCEDURE — 71020 XR CHEST PA LAT: CPT

## 2017-02-08 PROCEDURE — 86900 BLOOD TYPING SEROLOGIC ABO: CPT | Performed by: ORTHOPAEDIC SURGERY

## 2017-02-08 PROCEDURE — 36415 COLL VENOUS BLD VENIPUNCTURE: CPT | Performed by: ORTHOPAEDIC SURGERY

## 2017-02-08 PROCEDURE — 82040 ASSAY OF SERUM ALBUMIN: CPT | Performed by: ORTHOPAEDIC SURGERY

## 2017-02-08 PROCEDURE — 87086 URINE CULTURE/COLONY COUNT: CPT | Performed by: ORTHOPAEDIC SURGERY

## 2017-02-09 ENCOUNTER — OFFICE VISIT (OUTPATIENT)
Dept: ORTHOPEDIC SURGERY | Facility: CLINIC | Age: 67
End: 2017-02-09

## 2017-02-09 ENCOUNTER — OFFICE VISIT (OUTPATIENT)
Dept: FAMILY MEDICINE CLINIC | Facility: CLINIC | Age: 67
End: 2017-02-09

## 2017-02-09 VITALS
HEIGHT: 64 IN | DIASTOLIC BLOOD PRESSURE: 82 MMHG | RESPIRATION RATE: 15 BRPM | TEMPERATURE: 96.7 F | WEIGHT: 182 LBS | SYSTOLIC BLOOD PRESSURE: 149 MMHG | OXYGEN SATURATION: 96 % | BODY MASS INDEX: 31.07 KG/M2 | HEART RATE: 65 BPM

## 2017-02-09 VITALS
TEMPERATURE: 97.2 F | WEIGHT: 183 LBS | BODY MASS INDEX: 31.24 KG/M2 | HEART RATE: 70 BPM | SYSTOLIC BLOOD PRESSURE: 165 MMHG | DIASTOLIC BLOOD PRESSURE: 86 MMHG | HEIGHT: 64 IN

## 2017-02-09 DIAGNOSIS — E78.00 PURE HYPERCHOLESTEROLEMIA: ICD-10-CM

## 2017-02-09 DIAGNOSIS — E11.9 CONTROLLED TYPE 2 DIABETES MELLITUS WITHOUT COMPLICATION, WITHOUT LONG-TERM CURRENT USE OF INSULIN (HCC): ICD-10-CM

## 2017-02-09 DIAGNOSIS — I10 ESSENTIAL HYPERTENSION WITH GOAL BLOOD PRESSURE LESS THAN 140/90: Primary | ICD-10-CM

## 2017-02-09 DIAGNOSIS — M25.561 RIGHT KNEE PAIN, UNSPECIFIED CHRONICITY: Primary | ICD-10-CM

## 2017-02-09 LAB
BACTERIA SPEC CULT: NORMAL
SERVICE CMNT-IMP: NORMAL

## 2017-02-09 RX ORDER — WARFARIN 1 MG/1
10 TABLET ORAL ONCE
Status: CANCELLED | OUTPATIENT
Start: 2017-02-09 | End: 2017-02-09

## 2017-02-09 RX ORDER — LEVOFLOXACIN 500 MG/1
TABLET, FILM COATED ORAL
Qty: 5 TAB | Refills: 0 | Status: SHIPPED | OUTPATIENT
Start: 2017-02-09 | End: 2017-02-23

## 2017-02-09 RX ORDER — VALSARTAN AND HYDROCHLOROTHIAZIDE 160; 12.5 MG/1; MG/1
1 TABLET, FILM COATED ORAL DAILY
Qty: 90 TAB | Refills: 3 | Status: SHIPPED | OUTPATIENT
Start: 2017-02-09

## 2017-02-09 RX ORDER — ACETAMINOPHEN 325 MG/1
1000 TABLET ORAL ONCE
Status: CANCELLED | OUTPATIENT
Start: 2017-02-09 | End: 2017-02-09

## 2017-02-09 RX ORDER — PREGABALIN 25 MG/1
75 CAPSULE ORAL ONCE
Status: CANCELLED | OUTPATIENT
Start: 2017-02-09 | End: 2017-02-09

## 2017-02-09 RX ORDER — OXYCODONE HCL 10 MG/1
20 TABLET, FILM COATED, EXTENDED RELEASE ORAL EVERY 12 HOURS
Status: CANCELLED | OUTPATIENT
Start: 2017-02-09

## 2017-02-09 RX ORDER — LORAZEPAM 1 MG/1
1 TABLET ORAL
Qty: 30 TAB | Refills: 0 | Status: SHIPPED | OUTPATIENT
Start: 2017-02-09

## 2017-02-09 NOTE — H&P
[address] [facility], [place], operated by [facility], [address] [facility]  [phone]           HISTORY & PHYSICAL      Patient: Africa Tapia                MRN: [id number]       SSN: [id number]  YOB: 1950        AGE: 79 y.o. SEX: female  Body mass index is 31.41 kg/(m^2). PCP: Joyce Jin MD  02/09/17      CC: right knee end stage OA  Problem List Items Addressed This Visit     None      Visit Diagnoses     Right knee pain, unspecified chronicity    -  Primary    Relevant Orders    AMB POC XRAY, KNEE; COMPLETE, 4+ VIEW (Completed)            HPI:  The patient is a pleasant 79 y.o. whom has end stage OA of their Right knee and has failed conservative treatment including but not limited to NSAIDS, cortisone injections, viscosupplementation, PT, and pain medicine. Due to the current findings and affected activities of daily living, surgical intervention is indicated. The alternatives, risks, complications, as well as expected outcome were discussed. These include but are not limited to infection, blood loss, need for blood transfusion, neurovascular damage, DVT, PE,  post-op stiffness and pain, leg length discrepancy, dislocation, anesthetic complications, prothesis longevity, need for more surgery, MI, stroke, and even death. The patient understands and wishes to proceed with surgery.       Past Medical History   Diagnosis Date    Advance directive discussed with patient 8/17/2016    Aspirin intolerance 1/26/2017    Asthma      bronchitis    Cancer ([address] 1998     Left breast cancer    Cervical radiculopathy     Cigarette nicotine dependence in remission 8/17/2016    DDD (degenerative disc disease), lumbar     Degeneration of cervical intervertebral disc     Degeneration of thoracic intervertebral disc     Diabetes mellitus ([address]      type 2    Diabetes mellitus type 2, controlled ([address] 8/2/2016    Essential hypertension with goal blood pressure less than 140/90 8/10/2016    GERD (gastroesophageal reflux disease)     GI bleed 6/29/16    Gout     H. pylori infection 7/11/2016    H/O: GI bleed 1/26/2017    Hammer toe of left foot     Hypercholesterolemia     Hypertension     Lumbago with sciatica     Lumbar radiculopathy     Malignant neoplasm of left female breast ([facility] 8/2/2016    Mixed incontinence 8/10/2016    Obesity, Class I, BMI 30-34.9 8/2/2016    osteoarthritis     Osteoarthrosis, generalized, involving multiple sites     Osteoporosis     Paronychia of finger     Primary insomnia 8/10/2016    Sciatica of left side     Scoliosis     Segmental and somatic dysfunction of lumbar region     Segmental and somatic dysfunction of pelvic region     Urinary tract infection     Vertigo     Vitamin B12 deficiency     Vitamin D deficiency     Wound infection          Current Outpatient Prescriptions:     metFORMIN (GLUCOPHAGE) 500 mg tablet, Take 1.5 Tabs by mouth two (2) times daily (with meals). (Patient taking differently: Take 500 mg by mouth two (2) times daily (with meals). ), Disp: 180 Tab, Rfl: 3    traMADol (ULTRAM) 50 mg tablet, Take 1 Tab by mouth every six (6) hours as needed for Pain. Max Daily Amount: 200 mg., Disp: 120 Tab, Rfl: 0    CRANBERRY FRUIT EXTRACT (CRANBERRY CONCENTRATE PO), Take 2 Tabs by mouth daily. , Disp: , Rfl:     amLODIPine (NORVASC) 10 mg tablet, Take 1 Tab by mouth daily. , Disp: 90 Tab, Rfl: 3    valsartan (DIOVAN) 160 mg tablet, Take 1 Tab by mouth every evening., Disp: 90 Tab, Rfl: 3    metoprolol succinate (TOPROL-XL) 50 mg XL tablet, Take 1 Tab by mouth daily. , Disp: 90 Tab, Rfl: 3    febuxostat (ULORIC) 40 mg tab tablet, Take 1 Tab by mouth daily. , Disp: 90 Tab, Rfl: 3    fexofenadine (ALLEGRA) 180 mg tablet, Take 1 Tab by mouth daily. (Patient taking differently: Take 180 mg by mouth daily as needed.), Disp: 90 Tab, Rfl: 3    colchicine 0.6 mg tablet, Take 1 Tab by mouth daily. , Disp: 90 Tab, Rfl: 0    oxybutynin chloride XL (DITROPAN XL) 10 mg CR tablet, Take 1 Tab by mouth daily. , Disp: 90 Tab, Rfl: 3    melatonin 3 mg tablet, Take 1 Tab by mouth nightly., Disp: 90 Tab, Rfl: 3    pravastatin (PRAVACHOL) 20 mg tablet, Take 1 Tab by mouth nightly., Disp: 90 Tab, Rfl: 3    pantoprazole (PROTONIX) 40 mg tablet, Take 40 mg by mouth daily as needed. , Disp: , Rfl:     senna (SENOKOT) 8.6 mg tablet, Take 2 Tabs by mouth nightly., Disp: , Rfl:     Allergies   Allergen Reactions    Nsaids (Non-Steroidal Anti-Inflammatory Drug) Other (comments)     GI Bleed    Aspirin Other (comments)     Gi bleed         Social History     Social History    Marital status:      Spouse name: N/A    Number of children: N/A    Years of education: N/A     Occupational History    Not on file. Social History Main Topics    Smoking status: Former Smoker     Packs/day: 0.25     Years: 5.00     Quit date: 1/1/1976    Smokeless tobacco: Never Used      Comment: 4 cigarettes per day    Alcohol use No    Drug use: No    Sexual activity: Not Currently     Partners: Male     Birth control/ protection: None     Other Topics Concern    Not on file     Social History Narrative       Past Surgical History   Procedure Laterality Date    Hx tubal ligation Bilateral     Hx mastectomy Left 1998       PE: Visit Vitals    /86    Pulse 70    Temp 97.2 °F (36.2 °C)    Ht 5' 4\" (1.626 m)    Wt 183 lb (83 kg)    BMI 31.41 kg/m2       A&O X3, NAD, well develop, well nourished  Heart: S1-S2, rrr  Lungs: CTA bilat  Abd: soft, nt, nt, + bs in all quadrants  Ext:  Pos distal pulses to DP, PT      X-ray: right knee shows end stage OA    Labs: labs were reviewed and wnl.  ua pos, treated with levaquin    A:  Right  knee end stage OA    P:  At this point we will move forward with surgery.   Again, the alternatives, risks, complications, as well as expected outcome were discussed and the patient wishes to proceed with surgery. Pt has been instructed to stop aspirin, nsaids, rheumatologic medications and blood thinners. They have also been instructed to continue on any heart and bp meds and to take them the morning of surgery with sips of water.          Marya Blount

## 2017-02-09 NOTE — PROGRESS NOTES
Internal Medicine Progress Note    Today's Date:  2017   Patient:  Mansoor Palacios  Patient :  1950    Subjective:     Chief Complaint   Patient presents with    Diabetes    Hypertension      Diabetes mellitus  This is a chronic problem. BS is not at goal. Pt is on metformin. Pt is not on aspirin due to h/o GI bleed.  Pt is on a statin.       Hypertension   This is a chronic problem. BP is not at goal. Pt takes norvasc, toprol and valsartan. Pt reports compliance with these medications. Bilateral knee arthritis  This is a chronic problem. This is not at goal. Pt is scheduled for knee replacement in February. Pt takes tramadol and ativan. Gout   This is a chronic problem. This is not controlled. This is present in her right hand. Pt is taking colchicine and uloric. Allopurinol caused swelling.      Past Medical History   Diagnosis Date    Advance directive discussed with patient 2016    Aspirin intolerance 2017    Asthma      bronchitis    Cancer ([address] 1998     Left breast cancer    Cervical radiculopathy     Cigarette nicotine dependence in remission 2016    DDD (degenerative disc disease), lumbar     Degeneration of cervical intervertebral disc     Degeneration of thoracic intervertebral disc     Diabetes mellitus ([address]      type 2    Diabetes mellitus type 2, controlled ([address] 2016    Essential hypertension with goal blood pressure less than 140/90 8/10/2016    GERD (gastroesophageal reflux disease)     GI bleed 16    Gout     H. pylori infection 2016    H/O: GI bleed 2017    Hammer toe of left foot     Hypercholesterolemia     Hypertension     Lumbago with sciatica     Lumbar radiculopathy     Malignant neoplasm of left female breast ([address] 2016    Mixed incontinence 8/10/2016    Obesity, Class I, BMI 30-34.9 2016    osteoarthritis     Osteoarthrosis, generalized, involving multiple sites     Osteoporosis     Paronychia of finger     Primary insomnia 8/10/2016    Pure hypercholesterolemia 2/9/2017    Sciatica of left side     Scoliosis     Segmental and somatic dysfunction of lumbar region     Segmental and somatic dysfunction of pelvic region     Urinary tract infection     Vertigo     Vitamin B12 deficiency     Vitamin D deficiency     Wound infection      Past Surgical History   Procedure Laterality Date    Hx tubal ligation Bilateral     Hx mastectomy Left 1998      reports that she quit smoking about 41 years ago. She has a 1.25 pack-year smoking history. She has never used smokeless tobacco. She reports that she does not drink alcohol or use illicit drugs. Family History   Problem Relation Age of Onset    Hypertension Mother     Hypertension Father      Allergies   Allergen Reactions    Nsaids (Non-Steroidal Anti-Inflammatory Drug) Other (comments)     GI Bleed    Aspirin Other (comments)     Gi bleed       Review of Systems   Positives in bold  CV:      chest pain, palpitations  PULM:  SOB, wheezing, cough, sputum production    Current Outpatient Meds and Allergies     Current Outpatient Prescriptions on File Prior to Visit   Medication Sig Dispense Refill    levoFLOXacin (LEVAQUIN) 500 mg tablet 1 po q day x 5 days 5 Tab 0    LORazepam (ATIVAN) 1 mg tablet Take 1 Tab by mouth every eight (8) hours as needed. Max Daily Amount: 3 mg. Indications: ANXIETY 30 Tab 0    metFORMIN (GLUCOPHAGE) 500 mg tablet Take 1.5 Tabs by mouth two (2) times daily (with meals). (Patient taking differently: Take 500 mg by mouth two (2) times daily (with meals). ) 180 Tab 3    traMADol (ULTRAM) 50 mg tablet Take 1 Tab by mouth every six (6) hours as needed for Pain. Max Daily Amount: 200 mg. 120 Tab 0    CRANBERRY FRUIT EXTRACT (CRANBERRY CONCENTRATE PO) Take 2 Tabs by mouth daily.  amLODIPine (NORVASC) 10 mg tablet Take 1 Tab by mouth daily.  90 Tab 3    metoprolol succinate (TOPROL-XL) 50 mg XL tablet Take 1 Tab by mouth daily. 90 Tab 3    febuxostat (ULORIC) 40 mg tab tablet Take 1 Tab by mouth daily. 90 Tab 3    fexofenadine (ALLEGRA) 180 mg tablet Take 1 Tab by mouth daily. (Patient taking differently: Take 180 mg by mouth daily as needed.) 90 Tab 3    colchicine 0.6 mg tablet Take 1 Tab by mouth daily. 90 Tab 0    oxybutynin chloride XL (DITROPAN XL) 10 mg CR tablet Take 1 Tab by mouth daily. 90 Tab 3    melatonin 3 mg tablet Take 1 Tab by mouth nightly. 90 Tab 3    pravastatin (PRAVACHOL) 20 mg tablet Take 1 Tab by mouth nightly. 90 Tab 3    pantoprazole (PROTONIX) 40 mg tablet Take 40 mg by mouth daily as needed.  senna (SENOKOT) 8.6 mg tablet Take 2 Tabs by mouth nightly. No current facility-administered medications on file prior to visit.       Allergies   Allergen Reactions    Nsaids (Non-Steroidal Anti-Inflammatory Drug) Other (comments)     GI Bleed    Aspirin Other (comments)     Gi bleed       Objective:     VS:    Visit Vitals    /82 (BP 1 Location: Right arm, BP Patient Position: Sitting)    Pulse 65    Temp 96.7 °F (35.9 °C) (Oral)    Resp 15    Ht 5' 4\" (1.626 m)    Wt 182 lb (82.6 kg)    SpO2 96%    BMI 31.24 kg/m2     General:   Well-nourished, well-groomed, pleasant, alert, in no acute distress  Head:  Normocephalic, atraumatic  Ears:  External ears WNL  Nose:  External nares WNL  Psych:  No pressured speech, no abnormal thought content    Hospital Outpatient Visit on 02/08/2017   Component Date Value Ref Range Status    Albumin 02/08/2017 3.6  3.4 - 5.0 g/dL Final    Prothrombin time 02/08/2017 12.5  11.5 - 15.2 sec Final    INR 02/08/2017 1.0  0.8 - 1.2   Final    Comment:            INR Therapeutic Ranges         (on stable oral anticoagulant):     INDICATION                INR  DVT/PE/Atrial Fib          2.0-3.0  MI/Mechanical Heart Valve  2.5-3.5      aPTT 02/08/2017 26.9  23.0 - 36.4 SEC Final    WBC 02/08/2017 4.6  4.6 - 13.2 K/uL Final    RBC 02/08/2017 4.42  4.20 - 5.30 M/uL Final    HGB 02/08/2017 13.1  12.0 - 16.0 g/dL Final    HCT 02/08/2017 39.8  35.0 - 45.0 % Final    MCV 02/08/2017 90.0  74.0 - 97.0 FL Final    MCH 02/08/2017 29.6  24.0 - 34.0 PG Final    MCHC 02/08/2017 32.9  31.0 - 37.0 g/dL Final    RDW 02/08/2017 14.7* 11.6 - 14.5 % Final    PLATELET 15/65/9003 643  135 - 420 K/uL Final    MPV 02/08/2017 11.7  9.2 - 11.8 FL Final    NEUTROPHILS 02/08/2017 47  40 - 73 % Final    LYMPHOCYTES 02/08/2017 37  21 - 52 % Final    MONOCYTES 02/08/2017 13* 3 - 10 % Final    EOSINOPHILS 02/08/2017 3  0 - 5 % Final    BASOPHILS 02/08/2017 0  0 - 2 % Final    ABS. NEUTROPHILS 02/08/2017 2.1  1.8 - 8.0 K/UL Final    ABS. LYMPHOCYTES 02/08/2017 1.7  0.9 - 3.6 K/UL Final    ABS. MONOCYTES 02/08/2017 0.6  0.05 - 1.2 K/UL Final    ABS. EOSINOPHILS 02/08/2017 0.1  0.0 - 0.4 K/UL Final    ABS. BASOPHILS 02/08/2017 0.0  0.0 - 0.1 K/UL Final    DF 02/08/2017 AUTOMATED    Final    Sodium 02/08/2017 141  136 - 145 mmol/L Final    Potassium 02/08/2017 4.3  3.5 - 5.5 mmol/L Final    Chloride 02/08/2017 101  100 - 108 mmol/L Final    CO2 02/08/2017 32  21 - 32 mmol/L Final    Anion gap 02/08/2017 8  3.0 - 18 mmol/L Final    Glucose 02/08/2017 120* 74 - 99 mg/dL Final    BUN 02/08/2017 23* 7.0 - 18 MG/DL Final    Creatinine 02/08/2017 1.09  0.6 - 1.3 MG/DL Final    BUN/Creatinine ratio 02/08/2017 21* 12 - 20   Final    GFR est AA 02/08/2017 >60  >60 ml/min/1.73m2 Final    GFR est non-AA 02/08/2017 50* >60 ml/min/1.73m2 Final    Comment: (NOTE)  Estimated GFR is calculated using the Modification of Diet in Renal   Disease (MDRD) Study equation, reported for both  Americans   (GFRAA) and non- Americans (GFRNA), and normalized to 1.73m2   body surface area. The physician must decide which value applies to   the patient. The MDRD study equation should only be used in   individuals age 25 or older.  It has not been validated for the   following: pregnant women, patients with serious comorbid conditions,   or on certain medications, or persons with extremes of body size,   muscle mass, or nutritional status.  Calcium 02/08/2017 9.7  8.5 - 10.1 MG/DL Final    Color 02/08/2017 YELLOW    Final    Appearance 02/08/2017 CLEAR    Final    Specific gravity 02/08/2017 1.011  1.005 - 1.030   Final    pH (UA) 02/08/2017 6.5  5.0 - 8.0   Final    Protein 02/08/2017 NEGATIVE   NEG mg/dL Final    Glucose 02/08/2017 NEGATIVE   NEG mg/dL Final    Ketone 02/08/2017 NEGATIVE   NEG mg/dL Final    Bilirubin 02/08/2017 NEGATIVE   NEG   Final    Blood 02/08/2017 NEGATIVE   NEG   Final    Urobilinogen 02/08/2017 0.2  0.2 - 1.0 EU/dL Final    Nitrites 02/08/2017 NEGATIVE   NEG   Final    Leukocyte Esterase 02/08/2017 SMALL* NEG   Final    Special Requests: 02/08/2017 NO SPECIAL REQUESTS    Final    Culture result: 02/08/2017     Final                    Value:[id number]  COLONIES/mL  MIXED GRAM POSITIVE SAAD, PROBABLE SKIN/GENITAL CONTAMINATION.  Crossmatch Expiration 02/08/2017 02/16/2017   Final    ABO/Rh(D) 02/08/2017 O POSITIVE   Final    Antibody screen 02/08/2017 NEG   Final    Hemoglobin A1c 02/08/2017 7.2* 4.2 - 5.6 % Final    Comment: (NOTE)  HbA1C Interpretive Ranges  <5.7              Normal  5.7 - 6.4         Consider Prediabetes  >6.5              Consider Diabetes      Est. average glucose 02/08/2017 160  mg/dL Final    Comment: (NOTE)  The eAG should be interpreted with patient characteristics in mind   since ethnicity, interindividual differences, red cell lifespan,   variation in rates of glycation, etc. may affect the validity of the   calculation.       WBC 02/08/2017 0 to 2  0 - 4 /hpf Final    RBC 02/08/2017 0 to 2  0 - 5 /hpf Final    Epithelial cells 02/08/2017 FEW  0 - 5 /lpf Final    Yeast 02/08/2017 FEW* NEG   Final   Hospital Outpatient Visit on 01/16/2017   Component Date Value Ref Range Status    Hemoglobin A1c 01/16/2017 7.6* 4.2 - 5.6 % Final    Comment: (NOTE)  HbA1C Interpretive Ranges  <5.7              Normal  5.7 - 6.4         Consider Prediabetes  >6.5              Consider Diabetes      Est. average glucose 01/16/2017 171  mg/dL Final    Comment: (NOTE)  The eAG should be interpreted with patient characteristics in mind   since ethnicity, interindividual differences, red cell lifespan,   variation in rates of glycation, etc. may affect the validity of the   calculation.  Sodium 01/16/2017 141  136 - 145 mmol/L Final    Potassium 01/16/2017 3.9  3.5 - 5.5 mmol/L Final    Chloride 01/16/2017 100  100 - 108 mmol/L Final    CO2 01/16/2017 34* 21 - 32 mmol/L Final    Anion gap 01/16/2017 7  3.0 - 18 mmol/L Final    Glucose 01/16/2017 122* 74 - 99 mg/dL Final    BUN 01/16/2017 15  7.0 - 18 MG/DL Final    Creatinine 01/16/2017 1.13  0.6 - 1.3 MG/DL Final    BUN/Creatinine ratio 01/16/2017 13  12 - 20   Final    GFR est AA 01/16/2017 58* >60 ml/min/1.73m2 Final    GFR est non-AA 01/16/2017 48* >60 ml/min/1.73m2 Final    Comment: (NOTE)  Estimated GFR is calculated using the Modification of Diet in Renal   Disease (MDRD) Study equation, reported for both  Americans   (GFRAA) and non- Americans (GFRNA), and normalized to 1.73m2   body surface area. The physician must decide which value applies to   the patient. The MDRD study equation should only be used in   individuals age 25 or older. It has not been validated for the   following: pregnant women, patients with serious comorbid conditions,   or on certain medications, or persons with extremes of body size,   muscle mass, or nutritional status.  Calcium 01/16/2017 9.5  8.5 - 10.1 MG/DL Final    Bilirubin, total 01/16/2017 0.6  0.2 - 1.0 MG/DL Final    ALT (SGPT) 01/16/2017 18  13 - 56 U/L Final    AST (SGOT) 01/16/2017 22  15 - 37 U/L Final    Alk.  phosphatase 01/16/2017 58  45 - 117 U/L Final    Protein, total 01/16/2017 7.8  6.4 - 8.2 g/dL Final    Albumin 01/16/2017 3.7  3.4 - 5.0 g/dL Final    Globulin 01/16/2017 4.1* 2.0 - 4.0 g/dL Final    A-G Ratio 01/16/2017 0.9  0.8 - 1.7   Final   Hospital Outpatient Visit on 12/16/2016   Component Date Value Ref Range Status    Ventricular Rate 12/16/2016 56  BPM Final    Atrial Rate 12/16/2016 56  BPM Final    P-R Interval 12/16/2016 184  ms Final    QRS Duration 12/16/2016 84  ms Final    Q-T Interval 12/16/2016 430  ms Final    QTC Calculation (Bezet) 12/16/2016 414  ms Final    Calculated P Axis 12/16/2016 77  degrees Final    Calculated R Axis 12/16/2016 20  degrees Final    Calculated T Axis 12/16/2016 97  degrees Final    Diagnosis 12/16/2016    Final                    Value:Sinus bradycardia  Nonspecific T wave abnormality  Abnormal ECG  No previous ECGs available  Confirmed by Ovidio Arriola ([id number]) on 12/16/2016 4:08:53 PM     Hospital Outpatient Visit on 12/16/2016   Component Date Value Ref Range Status    Prothrombin time 12/16/2016 12.6  11.5 - 15.2 sec Final    INR 12/16/2016 1.0  0.8 - 1.2   Final    Comment:            INR Therapeutic Ranges         (on stable oral anticoagulant):     INDICATION                INR  DVT/PE/Atrial Fib          2.0-3.0  MI/Mechanical Heart Valve  2.5-3.5      aPTT 12/16/2016 28.0  23.0 - 36.4 SEC Final    WBC 12/16/2016 5.0  4.6 - 13.2 K/uL Final    RBC 12/16/2016 4.95  4.20 - 5.30 M/uL Final    HGB 12/16/2016 14.0  12.0 - 16.0 g/dL Final    HCT 12/16/2016 43.3  35.0 - 45.0 % Final    MCV 12/16/2016 87.5  74.0 - 97.0 FL Final    MCH 12/16/2016 28.3  24.0 - 34.0 PG Final    MCHC 12/16/2016 32.3  31.0 - 37.0 g/dL Final    RDW 12/16/2016 16.8* 11.6 - 14.5 % Final    PLATELET 46/01/8355 448  135 - 420 K/uL Final    MPV 12/16/2016 11.1  9.2 - 11.8 FL Final    NEUTROPHILS 12/16/2016 46  40 - 73 % Final    LYMPHOCYTES 12/16/2016 43  21 - 52 % Final    MONOCYTES 12/16/2016 8  3 - 10 % Final    EOSINOPHILS 12/16/2016 3  0 - 5 % Final    BASOPHILS 12/16/2016 0  0 - 2 % Final    ABS. NEUTROPHILS 12/16/2016 2.2  1.8 - 8.0 K/UL Final    ABS. LYMPHOCYTES 12/16/2016 2.2  0.9 - 3.6 K/UL Final    ABS. MONOCYTES 12/16/2016 0.4  0.05 - 1.2 K/UL Final    ABS. EOSINOPHILS 12/16/2016 0.2  0.0 - 0.4 K/UL Final    ABS. BASOPHILS 12/16/2016 0.0  0.0 - 0.06 K/UL Final    DF 12/16/2016 AUTOMATED    Final    Sodium 12/16/2016 142  136 - 145 mmol/L Final    Potassium 12/16/2016 4.2  3.5 - 5.5 mmol/L Final    Chloride 12/16/2016 100  100 - 108 mmol/L Final    CO2 12/16/2016 34* 21 - 32 mmol/L Final    Anion gap 12/16/2016 8  3.0 - 18 mmol/L Final    Glucose 12/16/2016 132* 74 - 99 mg/dL Final    BUN 12/16/2016 17  7.0 - 18 MG/DL Final    Creatinine 12/16/2016 0.99  0.6 - 1.3 MG/DL Final    BUN/Creatinine ratio 12/16/2016 17  12 - 20   Final    GFR est AA 12/16/2016 >60  >60 ml/min/1.73m2 Final    GFR est non-AA 12/16/2016 56* >60 ml/min/1.73m2 Final    Comment: (NOTE)  Estimated GFR is calculated using the Modification of Diet in Renal   Disease (MDRD) Study equation, reported for both  Americans   (GFRAA) and non- Americans (GFRNA), and normalized to 1.73m2   body surface area. The physician must decide which value applies to   the patient. The MDRD study equation should only be used in   individuals age 25 or older. It has not been validated for the   following: pregnant women, patients with serious comorbid conditions,   or on certain medications, or persons with extremes of body size,   muscle mass, or nutritional status.  Calcium 12/16/2016 9.2  8.5 - 10.1 MG/DL Final    Bilirubin, total 12/16/2016 0.6  0.2 - 1.0 MG/DL Final    ALT (SGPT) 12/16/2016 19  13 - 56 U/L Final    AST (SGOT) 12/16/2016 22  15 - 37 U/L Final    Alk.  phosphatase 12/16/2016 74  45 - 117 U/L Final    Protein, total 12/16/2016 8.0  6.4 - 8.2 g/dL Final    Albumin 12/16/2016 3.7  3.4 - 5.0 g/dL Final    Globulin 12/16/2016 4.3* 2.0 - 4.0 g/dL Final    A-G Ratio 12/16/2016 0.9  0.8 - 1.7   Final    Color 12/16/2016 YELLOW    Final    Appearance 12/16/2016 CLEAR    Final    Specific gravity 12/16/2016 1.010  1.005 - 1.030   Final    pH (UA) 12/16/2016 7.0  5.0 - 8.0   Final    Protein 12/16/2016 NEGATIVE   NEG mg/dL Final    Glucose 12/16/2016 NEGATIVE   NEG mg/dL Final    Ketone 12/16/2016 NEGATIVE   NEG mg/dL Final    Bilirubin 12/16/2016 NEGATIVE   NEG   Final    Blood 12/16/2016 NEGATIVE   NEG   Final    Urobilinogen 12/16/2016 0.2  0.2 - 1.0 EU/dL Final    Nitrites 12/16/2016 NEGATIVE   NEG   Final    Leukocyte Esterase 12/16/2016 TRACE* NEG   Final    Special Requests: 12/16/2016 NO SPECIAL REQUESTS    Final    Culture result: 12/16/2016 NO GROWTH 2 DAYS    Final    WBC 12/16/2016 0 to 3  0 - 4 /hpf Final    RBC 12/16/2016 0  0 - 5 /hpf Final    Epithelial cells 12/16/2016 1+  0 - 5 /lpf Final    Bacteria 12/16/2016 NEGATIVE   NEG /hpf Final    Other: 12/16/2016 STARCH GRANULES 2+   Final   Hospital Outpatient Visit on 11/14/2016   Component Date Value Ref Range Status    Hemoglobin A1c 11/14/2016 8.4* 4.2 - 5.6 % Final    Comment: (NOTE)  HbA1C Interpretive Ranges  <5.7              Normal  5.7 - 6.4         Consider Prediabetes  >6.5              Consider Diabetes      Est. average glucose 11/14/2016 194  mg/dL Final    Comment: (NOTE)  The eAG should be interpreted with patient characteristics in mind   since ethnicity, interindividual differences, red cell lifespan,   variation in rates of glycation, etc. may affect the validity of the   calculation.  Uric acid 11/14/2016 6.5  2.6 - 7.2 MG/DL Final     Assessment/Plan & Orders:         ICD-10-CM ICD-9-CM    1. Essential hypertension with goal blood pressure less than 140/90 I10 401.9 valsartan-hydroCHLOROthiazide (DIOVAN-HCT) 160-12.5 mg per tablet   2. Controlled type 2 diabetes mellitus without complication, without long-term current use of insulin (HCC) E11.9 250.00    3.  Pure hypercholesterolemia E78.00 272.0 LIPID PANEL     Healthy lifestyle has been encouraged including avoidance of tobacco, limiting or avoiding alcohol intake, heart healthy diet which is low in cholesterol and saturated fat and contains fresh fruits, vegetables and whole grains and fiber, regular exercise with goals of 20-30 minutes 3-5 days weekly and maintaining an optimal BMI. Pt to referred for eye exam. Will try to get record    ASA or antiplatelet therapy not prescibed for patient reasons. Follow-up Disposition:  Return in about 2 weeks (around 2/23/2017) for Follow up hypertension, Follow up diabetes mellitus, Follow up hyperlipidemia. *Patient verbalized understanding and agreement with the plan. Patient was given an after-visit summary. Timothy Parisi.  [facility], MD - Internal Medicine  2/9/2017, 12:25 PM  [facility]  [address] [place] [address] [place], [address]  Phone [phone]  Fax [phone]

## 2017-02-09 NOTE — MR AVS SNAPSHOT
Visit Information Date & Time Provider Department Dept. Phone Encounter #  
 2/9/2017  1:45 PM Francisco Melo MD [facility] [phone] [id number] Follow-up Instructions Return in about 2 weeks (around 2/23/2017) for Follow up hypertension, Follow up diabetes mellitus, Follow up hyperlipidemia. Your Appointments 2/15/2017  8:30 AM  
Follow Up with Francisco Melo MD  
[facility]) Appt Note: Return in about 3 months (around 2/14/2017) for Follow up hypertension, Follow up hyperlipidemia, Follow up diabetes mellitus. [address] [place] [address] [place]  
[phone]  
  
   
 [phone]  
  
    
 2/23/2017 11:30 AM  
ANTICOAG NURSE with Felton Garay MD  
[facility] ([facility]) Appt Note: [address] [place] [address]  
[phone]  
  
   
 [phone]  
  
    
 3/2/2017  9:45 AM  
POST OP with Aundrea Felipe PA-C  
[facility] [address] [facility]) Appt Note: SX 2/14/2017 RIGHT TOTAL KNEE ARTHROPLASTY  
 [address] [facility], [address] [place] [address]  
[phone]  
  
   
 [address] [facility], [address] [place] [id number] Upcoming Health Maintenance Date Due  
 EYE EXAM RETINAL OR DILATED Q1 1/25/1960 Pneumococcal 65+ High/Highest Risk (1 of 2 - PCV13) 1/25/2015 LIPID PANEL Q1 8/2/2017 HEMOGLOBIN A1C Q6M 8/8/2017 MICROALBUMIN Q1 8/10/2017 FOBT Q 1 YEAR AGE 50-75 8/17/2017 MEDICARE YEARLY EXAM 8/18/2017 FOOT EXAM Q1 9/19/2017 BREAST CANCER SCRN MAMMOGRAM 2/28/2018 GLAUCOMA SCREENING Q2Y 3/1/2018 DTaP/Tdap/Td series (2 - Td) 8/17/2026 Allergies as of 2/9/2017  Review Complete On: 2/9/2017 By: Francisco Melo MD  
  
 Severity Noted Reaction Type Reactions Nsaids (Non-steroidal Anti-inflammatory Drug) High 08/02/2016   Systemic Other (comments) GI Bleed Aspirin  08/02/2016   Systemic Other (comments) Gi bleed Current Immunizations  Never Reviewed No immunizations on file. Not reviewed this visit You Were Diagnosed With   
  
 Codes Comments Essential hypertension with goal blood pressure less than 140/90    -  Primary ICD-10-CM: I10 
ICD-9-CM: 401.9 Controlled type 2 diabetes mellitus without complication, without long-term current use of insulin ([facility]     ICD-10-CM: E11.9 ICD-9-CM: 250.00 Pure hypercholesterolemia     ICD-10-CM: E78.00 ICD-9-CM: 272.0 Vitals BP Pulse Temp Resp Height(growth percentile) Weight(growth percentile) 149/82 (BP 1 Location: Right arm, BP Patient Position: Sitting) 65 96.7 °F (35.9 °C) (Oral) 15 5' 4\" (1.626 m) 182 lb (82.6 kg) SpO2 BMI OB Status Smoking Status 96% 31.24 kg/m2 Menopause Former Smoker Vitals History BMI and BSA Data Body Mass Index Body Surface Area  
 31.24 kg/m 2 1.93 m 2 Preferred Pharmacy Pharmacy Name Phone [facility] [address] [phone] Your Updated Medication List  
  
   
This list is accurate as of: 2/9/17  3:44 PM.  Always use your most recent med list. amLODIPine 10 mg tablet Commonly known as:  Umang Alstrom Take 1 Tab by mouth daily. colchicine 0.6 mg tablet Take 1 Tab by mouth daily. CRANBERRY CONCENTRATE PO Take 2 Tabs by mouth daily. febuxostat 40 mg Tab tablet Commonly known as:  [place] Take 1 Tab by mouth daily. fexofenadine 180 mg tablet Commonly known as:  Tiff Frankel Take 1 Tab by mouth daily. levoFLOXacin 500 mg tablet Commonly known as:  LEVAQUIN  
1 po q day x 5 days LORazepam 1 mg tablet Commonly known as:  ATIVAN  
 Take 1 Tab by mouth every eight (8) hours as needed. Max Daily Amount: 3 mg. Indications: ANXIETY  
  
 melatonin 3 mg tablet Take 1 Tab by mouth nightly. metFORMIN 500 mg tablet Commonly known as:  GLUCOPHAGE Take 1.5 Tabs by mouth two (2) times daily (with meals). metoprolol succinate 50 mg XL tablet Commonly known as:  TOPROL-XL Take 1 Tab by mouth daily. oxybutynin chloride XL 10 mg CR tablet Commonly known as:  DITROPAN XL Take 1 Tab by mouth daily. pantoprazole 40 mg tablet Commonly known as:  PROTONIX Take 40 mg by mouth daily as needed. pravastatin 20 mg tablet Commonly known as:  PRAVACHOL Take 1 Tab by mouth nightly. senna 8.6 mg tablet Commonly known as:  [employer] Take 2 Tabs by mouth nightly. traMADol 50 mg tablet Commonly known as:  ULTRAM  
Take 1 Tab by mouth every six (6) hours as needed for Pain. Max Daily Amount: 200 mg.  
  
 valsartan-hydroCHLOROthiazide 160-12.5 mg per tablet Commonly known as:  DIOVAN-HCT Take 1 Tab by mouth daily. Prescriptions Sent to Pharmacy Refills  
 valsartan-hydroCHLOROthiazide (DIOVAN-HCT) 160-12.5 mg per tablet 3 Sig: Take 1 Tab by mouth daily. Class: Normal  
 Pharmacy: [facility] [address] #: [phone] Route: Oral  
  
Follow-up Instructions Return in about 2 weeks (around 2/23/2017) for Follow up hypertension, Follow up diabetes mellitus, Follow up hyperlipidemia. To-Do List   
 02/09/2017 Lab:  LIPID PANEL Patient Instructions Diabetes Sick-Day Plan: Care Instructions Your Care Instructions If you have diabetes, many other illnesses can make your blood sugar go up. This can be dangerous. When you are sick with the flu or another illness, your body releases hormones to fight infection.  These hormones raise blood sugar levels and make it hard for insulin or other medicines to lower your blood sugar. Work with your doctor to make a plan for what to do on days when you are sick. Follow-up care is a key part of your treatment and safety. Be sure to make and go to all appointments, and call your doctor if you are having problems. It's also a good idea to know your test results and keep a list of the medicines you take. How can you care for yourself at home? · Work with your doctor to write up a sick-day plan for what to do on days when you are sick. Your blood sugar can go up or down, depending on your illness and whether you can keep food down. Call your doctor when you are sick, to see if you need to adjust your pills or insulin. · Write down the diabetes medicines you have been taking and whether you have changed the dose based on your sick-day plan. Have this information ready when you call your doctor. · Eat your normal types and amounts of food. Drink extra fluids, such as water, broth, and fruit juice, to prevent dehydration. ¨ If your blood sugar level is higher than the blood sugar level your doctor recommends (for example, above 240 milligrams per deciliter [mg/dL]), drink extra liquids that do not contain sugar, such as water or sugar-free cola. ¨ If you cannot eat your usual foods, drink extra liquids, such as soup, sports drinks, or milk. You may also eat food that is gentle on the stomach, such as crackers, gelatin dessert, or applesauce. Try to eat or drink 50 grams of carbohydrate every 3 to 4 hours. For example, 6 saltine crackers, 1 cup (8 ounces) of milk, and ½ cup (4 ounces) of orange juice each contain about 15 grams of carbohydrate. · Check your blood sugar at least every 3 to 4 hours. If it goes up fast, check it more often. And check it even through the night. Take insulin if your doctor told you to do so.  If you and your doctor did not have a sick-day plan for taking extra insulin, call him or her for advice. · If you take insulin, check your urine or blood for ketones. This is especially important if your blood sugar is high. · Do not take any over-the-counter medicines, such as pain relievers, decongestants, or herbal products or other natural medicines, without talking with your doctor first. 
· Do not drive. If you need to see your doctor or go anywhere else, ask a family member or friend to drive you. When should you call for help? Call 911 anytime you think you may need emergency care. For example, call if: 
· You passed out (lost consciousness), or you suddenly become very sleepy or confused. (You may have very low blood sugar.) · You have symptoms of high blood sugar, such as: ¨ Blurred vision. ¨ Trouble staying awake or being woken up. ¨ Fast, deep breathing. ¨ Breath that smells fruity. ¨ Belly pain, not feeling hungry, and vomiting. ¨ Feeling confused. Call your doctor now or seek immediate medical care if: 
· You are sick and cannot control your blood sugar. · You have been vomiting or have had diarrhea for more than 6 hours. · Your blood sugar stays higher than the level your doctor has set for you. · You have symptoms of low blood sugar, such as: ¨ Sweating. ¨ Feeling nervous, shaky, and weak. ¨ Extreme hunger and slight nausea. ¨ Dizziness and headache. ¨ Blurred vision. ¨ Confusion. Watch closely for changes in your health, and be sure to contact your doctor if: 
· You have a hard time knowing when your blood sugar is low. · You have trouble keeping your blood sugar in the target range. · You often have problems controlling your blood sugar. · You have symptoms of long-term diabetes problems, such as: ¨ New vision changes. ¨ New pain, numbness, or tingling in your hands or feet. ¨ Skin problems. Where can you learn more? Go to http://tatiana-harvinder.info/. Enter M616 in the search box to learn more about \"Diabetes Sick-Day Plan: Care Instructions. \" Current as of: May 23, 2016 Content Version: 11.1 © 9596-5930 [employer]. Care instructions adapted under license by [employer] (which disclaims liability or warranty for this information). If you have questions about a medical condition or this instruction, always ask your healthcare professional. [address] any warranty or liability for your use of this information. Introducing [facility]! Dear Jonatan Mena: Thank you for requesting a Sonico account. Our records indicate that you already have an active Sonico account. You can access your account anytime at [email or web address] Did you know that you can access your hospital and ER discharge instructions at any time in Sonico? You can also review all of your test results from your hospital stay or ER visit. Additional Information If you have questions, please visit the Frequently Asked Questions section of the Sonico website at [email or web address] Remember, Sonico is NOT to be used for urgent needs. For medical emergencies, dial 911. Now available from your iPhone and Android! Please provide this summary of care documentation to your next provider. Your primary care clinician is listed as Kristal Dao. If you have any questions after today's visit, please call [phone].

## 2017-02-09 NOTE — PATIENT INSTRUCTIONS
Diabetes Sick-Day Plan: Care Instructions  Your Care Instructions  If you have diabetes, many other illnesses can make your blood sugar go up. This can be dangerous. When you are sick with the flu or another illness, your body releases hormones to fight infection. These hormones raise blood sugar levels and make it hard for insulin or other medicines to lower your blood sugar. Work with your doctor to make a plan for what to do on days when you are sick. Follow-up care is a key part of your treatment and safety. Be sure to make and go to all appointments, and call your doctor if you are having problems. It's also a good idea to know your test results and keep a list of the medicines you take. How can you care for yourself at home? · Work with your doctor to write up a sick-day plan for what to do on days when you are sick. Your blood sugar can go up or down, depending on your illness and whether you can keep food down. Call your doctor when you are sick, to see if you need to adjust your pills or insulin. · Write down the diabetes medicines you have been taking and whether you have changed the dose based on your sick-day plan. Have this information ready when you call your doctor. · Eat your normal types and amounts of food. Drink extra fluids, such as water, broth, and fruit juice, to prevent dehydration. ¨ If your blood sugar level is higher than the blood sugar level your doctor recommends (for example, above 240 milligrams per deciliter [mg/dL]), drink extra liquids that do not contain sugar, such as water or sugar-free cola. ¨ If you cannot eat your usual foods, drink extra liquids, such as soup, sports drinks, or milk. You may also eat food that is gentle on the stomach, such as crackers, gelatin dessert, or applesauce. Try to eat or drink 50 grams of carbohydrate every 3 to 4 hours.  For example, 6 saltine crackers, 1 cup (8 ounces) of milk, and ½ cup (4 ounces) of orange juice each contain about 15 grams of carbohydrate. · Check your blood sugar at least every 3 to 4 hours. If it goes up fast, check it more often. And check it even through the night. Take insulin if your doctor told you to do so. If you and your doctor did not have a sick-day plan for taking extra insulin, call him or her for advice. · If you take insulin, check your urine or blood for ketones. This is especially important if your blood sugar is high. · Do not take any over-the-counter medicines, such as pain relievers, decongestants, or herbal products or other natural medicines, without talking with your doctor first.  · Do not drive. If you need to see your doctor or go anywhere else, ask a family member or friend to drive you. When should you call for help? Call 911 anytime you think you may need emergency care. For example, call if:  · You passed out (lost consciousness), or you suddenly become very sleepy or confused. (You may have very low blood sugar.)  · You have symptoms of high blood sugar, such as:  ¨ Blurred vision. ¨ Trouble staying awake or being woken up. ¨ Fast, deep breathing. ¨ Breath that smells fruity. ¨ Belly pain, not feeling hungry, and vomiting. ¨ Feeling confused. Call your doctor now or seek immediate medical care if:  · You are sick and cannot control your blood sugar. · You have been vomiting or have had diarrhea for more than 6 hours. · Your blood sugar stays higher than the level your doctor has set for you. · You have symptoms of low blood sugar, such as:  ¨ Sweating. ¨ Feeling nervous, shaky, and weak. ¨ Extreme hunger and slight nausea. ¨ Dizziness and headache. ¨ Blurred vision. ¨ Confusion. Watch closely for changes in your health, and be sure to contact your doctor if:  · You have a hard time knowing when your blood sugar is low. · You have trouble keeping your blood sugar in the target range. · You often have problems controlling your blood sugar.   · You have symptoms of long-term diabetes problems, such as:  ¨ New vision changes. ¨ New pain, numbness, or tingling in your hands or feet. ¨ Skin problems. Where can you learn more? Go to http://tatiana-harvinder.info/. Enter T618 in the search box to learn more about \"Diabetes Sick-Day Plan: Care Instructions. \"  Current as of: May 23, 2016  Content Version: 11.1  © 5597-7515 [employer]. Care instructions adapted under license by [employer] (which disclaims liability or warranty for this information). If you have questions about a medical condition or this instruction, always ask your healthcare professional. [address] any warranty or liability for your use of this information.

## 2017-02-09 NOTE — PATIENT INSTRUCTIONS
Total Knee Replacement: Before Your Surgery  What is a total knee replacement? A total knee replacement replaces the worn ends of the bones where they meet at the knee. Those bones are the thighbone (femur) and the lower leg bone (tibia). Your doctor will remove the damaged bone. Then he or she will replace it with plastic and metal parts. These new parts may be attached to your bones with cement. Your doctor will make a cut down the center of your knee. This cut is called an incision. It will be about 8 to 10 inches long. Sometimes the surgery can be done with a smaller incision that is 4 to 6 inches. Both kinds of incisions leave scars that usually fade with time. You probably will be able to go home about 3 to 5 days after surgery. If you have both knees done at the same time, you may need to be in the hospital for 1 week. If there is no one to help you at home, you may go to a rehab center. Most people go back to normal activities or work in 4 to 16 weeks. This depends on your health. It also depends on how well your knee does in your rehab program. This may take longer if you have both knees done at the same time. Follow-up care is a key part of your treatment and safety. Be sure to make and go to all appointments, and call your doctor if you are having problems. It's also a good idea to know your test results and keep a list of the medicines you take. What happens before surgery? Surgery can be stressful. This information will help you understand what you can expect. And it will help you safely prepare for surgery. Preparing for surgery  · Understand exactly what surgery is planned, along with the risks, benefits, and other options. · Tell your doctors ALL the medicines, vitamins, supplements, and herbal remedies you take. Some of these can increase the risk of bleeding or interact with anesthesia.   · If you take blood thinners, such as warfarin (Coumadin), clopidogrel (Plavix), or aspirin, be sure to talk to your doctor. He or she will tell you if you should stop taking these medicines before your surgery. Make sure that you understand exactly what your doctor wants you to do. · Your doctor will tell you which medicines to take or stop before your surgery. You may need to stop taking certain medicines a week or more before surgery. So talk to your doctor as soon as you can. · If you have an advance directive, let your doctor know. It may include a living will and a durable power of  for health care. Bring a copy to the hospital. If you don't have one, you may want to prepare one. It lets your doctor and loved ones know your health care wishes. Doctors advise that everyone prepare these papers before any type of surgery or procedure. · You may need to shower or bathe with a special soap the night before and the morning of your surgery. The soap contains chlorhexidine. It reduces the amount of bacteria on your skin that could cause an infection after surgery. What happens on the day of surgery? · Follow the instructions exactly about when to stop eating and drinking. If you don't, your surgery may be canceled. If your doctor told you to take your medicines on the day of surgery, take them with only a sip of water. · Take a bath or shower before you come in for your surgery. Do not apply lotions, perfumes, deodorants, or nail polish. · Do not shave the surgical site yourself. · Take off all jewelry and piercings. And take out contact lenses, if you wear them. At the hospital or surgery center  · Bring a picture ID. · The area for surgery is often marked to make sure there are no errors. · You will be kept comfortable and safe by your anesthesia provider. The anesthesia may make you sleep. Or it may just numb the area being worked on.  · You also will get antibiotics through the IV tube before surgery. This lowers the risk of an infection of the incision.   · The surgery will take about 2 to 3 hours. Going home  · Be sure you have someone to drive you home. Anesthesia and pain medicine make it unsafe for you to drive. · You will be given more specific instructions about recovering from your surgery. They will cover things like diet, wound care, follow-up care, driving, and getting back to your normal routine. When should you call your doctor? · You have questions or concerns. · You don't understand how to prepare for your surgery. · You become ill before the surgery (such as fever, flu, or a cold). · You need to reschedule or have changed your mind about having the surgery. Where can you learn more? Go to http://tatianaBrayolaharvinder.info/. Enter K239 in the search box to learn more about \"Total Knee Replacement: Before Your Surgery. \"  Current as of: May 23, 2016  Content Version: 11.1  © 4307-9676 [employer]. Care instructions adapted under license by [employer] (which disclaims liability or warranty for this information). If you have questions about a medical condition or this instruction, always ask your healthcare professional. [place] [address] any warranty or liability for your use of this information. Patient: Poli Crump                MRN: [id number]       SSN: [id number]  YOB: 1950        AGE: 79 y.o. SEX: female  Body mass index is 31.41 kg/(m^2). 02/09/17    DO:  1:  Sit with the leg out straight 5-10 min every hour while awake. Keep the toes pointed       up towards the ivy. 2.  Bend your knee 5-10 min every hour. Bend it to the point of pain and hold it for 5-10       Min. 3.  ICE your knee 20 min every hour    DO NOT:    1. Do not place anything under your knee to prop it up  2. Do not sit in the recliner chair.

## 2017-02-09 NOTE — PROGRESS NOTES
Wilma Garza is a 79 y.o.  female presents today for office visit for pre-operative clearance for knee surgery, right on 2/14/17. Pt is in Room # 2. This patient is accompanied in the office by her son. 1. Have you been to the ER, urgent care clinic since your last visit? Hospitalized since your last visit? No    2. Have you seen or consulted any other health care providers outside of the [facility] since your last visit? Include any pap smears or colon screening.  No

## 2017-02-13 ENCOUNTER — ANESTHESIA EVENT (OUTPATIENT)
Dept: SURGERY | Age: 67
DRG: 470 | End: 2017-02-13
Payer: MEDICARE

## 2017-02-14 ENCOUNTER — SURGERY (OUTPATIENT)
Age: 67
End: 2017-02-14

## 2017-02-14 ENCOUNTER — HOSPITAL ENCOUNTER (INPATIENT)
Age: 67
LOS: 3 days | Discharge: HOME HEALTH | DRG: 470 | End: 2017-02-17
Attending: ORTHOPAEDIC SURGERY | Admitting: ORTHOPAEDIC SURGERY
Payer: MEDICARE

## 2017-02-14 ENCOUNTER — ANESTHESIA (OUTPATIENT)
Dept: SURGERY | Age: 67
DRG: 470 | End: 2017-02-14
Payer: MEDICARE

## 2017-02-14 ENCOUNTER — APPOINTMENT (OUTPATIENT)
Dept: GENERAL RADIOLOGY | Age: 67
DRG: 470 | End: 2017-02-14
Attending: ORTHOPAEDIC SURGERY
Payer: MEDICARE

## 2017-02-14 DIAGNOSIS — M17.10 ARTHRITIS OF KNEE: Primary | ICD-10-CM

## 2017-02-14 LAB
EST. AVERAGE GLUCOSE BLD GHB EST-MCNC: 146 MG/DL
GLUCOSE BLD STRIP.AUTO-MCNC: 120 MG/DL (ref 70–110)
GLUCOSE BLD STRIP.AUTO-MCNC: 138 MG/DL (ref 70–110)
GLUCOSE BLD STRIP.AUTO-MCNC: 201 MG/DL (ref 70–110)
GLUCOSE BLD STRIP.AUTO-MCNC: 206 MG/DL (ref 70–110)
GLUCOSE BLD STRIP.AUTO-MCNC: 220 MG/DL (ref 70–110)
GLUCOSE BLD STRIP.AUTO-MCNC: 98 MG/DL (ref 70–110)
HBA1C MFR BLD: 6.7 % (ref 4.2–5.6)

## 2017-02-14 PROCEDURE — C9290 INJ, BUPIVACAINE LIPOSOME: HCPCS | Performed by: PHYSICIAN ASSISTANT

## 2017-02-14 PROCEDURE — 0SRC0J9 REPLACEMENT OF RIGHT KNEE JOINT WITH SYNTHETIC SUBSTITUTE, CEMENTED, OPEN APPROACH: ICD-10-PCS | Performed by: ORTHOPAEDIC SURGERY

## 2017-02-14 PROCEDURE — 77030028925 HC PIN/DRL SET HUM S&N -C: Performed by: ORTHOPAEDIC SURGERY

## 2017-02-14 PROCEDURE — 76060000035 HC ANESTHESIA 2 TO 2.5 HR: Performed by: ORTHOPAEDIC SURGERY

## 2017-02-14 PROCEDURE — 74011250637 HC RX REV CODE- 250/637: Performed by: ORTHOPAEDIC SURGERY

## 2017-02-14 PROCEDURE — 77030016547 HC BLD SAW SAG1 STRY -B: Performed by: ORTHOPAEDIC SURGERY

## 2017-02-14 PROCEDURE — 97530 THERAPEUTIC ACTIVITIES: CPT

## 2017-02-14 PROCEDURE — 74011250636 HC RX REV CODE- 250/636: Performed by: PHYSICIAN ASSISTANT

## 2017-02-14 PROCEDURE — 77030010785: Performed by: ORTHOPAEDIC SURGERY

## 2017-02-14 PROCEDURE — 77030019908 HC STETH ESOPH SIMS -A: Performed by: ANESTHESIOLOGY

## 2017-02-14 PROCEDURE — C1713 ANCHOR/SCREW BN/BN,TIS/BN: HCPCS | Performed by: ORTHOPAEDIC SURGERY

## 2017-02-14 PROCEDURE — 77030019557 HC ELECTRD VES SEAL MEDT -F: Performed by: ORTHOPAEDIC SURGERY

## 2017-02-14 PROCEDURE — 77030008467 HC STPLR SKN COVD -B: Performed by: ORTHOPAEDIC SURGERY

## 2017-02-14 PROCEDURE — 77030018883 HC BLD SAW SAG4 STRY -B: Performed by: ORTHOPAEDIC SURGERY

## 2017-02-14 PROCEDURE — 36415 COLL VENOUS BLD VENIPUNCTURE: CPT | Performed by: ORTHOPAEDIC SURGERY

## 2017-02-14 PROCEDURE — 77030002933 HC SUT MCRYL J&J -A: Performed by: ORTHOPAEDIC SURGERY

## 2017-02-14 PROCEDURE — 73560 X-RAY EXAM OF KNEE 1 OR 2: CPT

## 2017-02-14 PROCEDURE — 77010033678 HC OXYGEN DAILY

## 2017-02-14 PROCEDURE — 76210000006 HC OR PH I REC 0.5 TO 1 HR: Performed by: ORTHOPAEDIC SURGERY

## 2017-02-14 PROCEDURE — 76010000131 HC OR TIME 2 TO 2.5 HR: Performed by: ORTHOPAEDIC SURGERY

## 2017-02-14 PROCEDURE — C1776 JOINT DEVICE (IMPLANTABLE): HCPCS | Performed by: ORTHOPAEDIC SURGERY

## 2017-02-14 PROCEDURE — 77030029494 HC CLD THER UNIT ICMN DJOR -B

## 2017-02-14 PROCEDURE — 74011000250 HC RX REV CODE- 250: Performed by: NURSE ANESTHETIST, CERTIFIED REGISTERED

## 2017-02-14 PROCEDURE — 74011250636 HC RX REV CODE- 250/636

## 2017-02-14 PROCEDURE — 74011250636 HC RX REV CODE- 250/636: Performed by: NURSE ANESTHETIST, CERTIFIED REGISTERED

## 2017-02-14 PROCEDURE — 74011000250 HC RX REV CODE- 250

## 2017-02-14 PROCEDURE — 74011250637 HC RX REV CODE- 250/637: Performed by: NURSE ANESTHETIST, CERTIFIED REGISTERED

## 2017-02-14 PROCEDURE — 83036 HEMOGLOBIN GLYCOSYLATED A1C: CPT | Performed by: ORTHOPAEDIC SURGERY

## 2017-02-14 PROCEDURE — 77030011640 HC PAD GRND REM COVD -A: Performed by: ORTHOPAEDIC SURGERY

## 2017-02-14 PROCEDURE — 74011250637 HC RX REV CODE- 250/637: Performed by: PHYSICIAN ASSISTANT

## 2017-02-14 PROCEDURE — 74011000250 HC RX REV CODE- 250: Performed by: ORTHOPAEDIC SURGERY

## 2017-02-14 PROCEDURE — 77030003601 HC NDL NRV BLK BBMI -A: Performed by: ORTHOPAEDIC SURGERY

## 2017-02-14 PROCEDURE — 77030008683 HC TU ET CUF COVD -A: Performed by: ANESTHESIOLOGY

## 2017-02-14 PROCEDURE — 77030020753 HC CUF TRNQT 1BLA STRY -B: Performed by: ORTHOPAEDIC SURGERY

## 2017-02-14 PROCEDURE — 77030020782 HC GWN BAIR PAWS FLX 3M -B: Performed by: ORTHOPAEDIC SURGERY

## 2017-02-14 PROCEDURE — 65270000029 HC RM PRIVATE

## 2017-02-14 PROCEDURE — 74011000250 HC RX REV CODE- 250: Performed by: PHYSICIAN ASSISTANT

## 2017-02-14 PROCEDURE — 77030013708 HC HNDPC SUC IRR PULS STRY –B: Performed by: ORTHOPAEDIC SURGERY

## 2017-02-14 PROCEDURE — 77030012411 HC DRN WND CARD -A: Performed by: ORTHOPAEDIC SURGERY

## 2017-02-14 PROCEDURE — 97162 PT EVAL MOD COMPLEX 30 MIN: CPT

## 2017-02-14 PROCEDURE — 74011000258 HC RX REV CODE- 258

## 2017-02-14 PROCEDURE — 82962 GLUCOSE BLOOD TEST: CPT

## 2017-02-14 PROCEDURE — 77030027138 HC INCENT SPIROMETER -A

## 2017-02-14 PROCEDURE — 74011250636 HC RX REV CODE- 250/636: Performed by: ORTHOPAEDIC SURGERY

## 2017-02-14 PROCEDURE — 77030012890: Performed by: ORTHOPAEDIC SURGERY

## 2017-02-14 PROCEDURE — 77030028224 HC PDNG CST BSNM -A: Performed by: ORTHOPAEDIC SURGERY

## 2017-02-14 PROCEDURE — 77030033263 HC DRSG MEPILEX 16-48IN BORD MOLN -B: Performed by: ORTHOPAEDIC SURGERY

## 2017-02-14 PROCEDURE — 74011000258 HC RX REV CODE- 258: Performed by: PHYSICIAN ASSISTANT

## 2017-02-14 PROCEDURE — 77030031139 HC SUT VCRL2 J&J -A: Performed by: ORTHOPAEDIC SURGERY

## 2017-02-14 PROCEDURE — 74011000258 HC RX REV CODE- 258: Performed by: ORTHOPAEDIC SURGERY

## 2017-02-14 PROCEDURE — 64447 NJX AA&/STRD FEMORAL NRV IMG: CPT | Performed by: ANESTHESIOLOGY

## 2017-02-14 PROCEDURE — 77030018836 HC SOL IRR NACL ICUM -A: Performed by: ORTHOPAEDIC SURGERY

## 2017-02-14 PROCEDURE — 74011636637 HC RX REV CODE- 636/637: Performed by: PHYSICIAN ASSISTANT

## 2017-02-14 PROCEDURE — 76942 ECHO GUIDE FOR BIOPSY: CPT | Performed by: ANESTHESIOLOGY

## 2017-02-14 PROCEDURE — 77030003029 HC SUT VCRL J&J -B: Performed by: ORTHOPAEDIC SURGERY

## 2017-02-14 DEVICE — CEMENT BNE 20ML 41GM FULL DOSE PMMA W/ TOBRA M VISC RADPQ: Type: IMPLANTABLE DEVICE | Site: KNEE | Status: FUNCTIONAL

## 2017-02-14 DEVICE — IMPLANTABLE DEVICE: Type: IMPLANTABLE DEVICE | Site: KNEE | Status: FUNCTIONAL

## 2017-02-14 DEVICE — LEGION POSTERIOR STABILIZED                                    OXINIUM FEMORAL SIZE 6 RIGHT
Type: IMPLANTABLE DEVICE | Site: KNEE | Status: FUNCTIONAL
Brand: LEGION

## 2017-02-14 DEVICE — GENESIS II NON-POROUS TIBIAL                                    BASEPLATE SIZE 3 RIGHT
Type: IMPLANTABLE DEVICE | Site: KNEE | Status: FUNCTIONAL
Brand: GENESIS II

## 2017-02-14 DEVICE — PAT ASYM TRITHLON X3 32X10MM -- TRIATHLON ASYMMETRIC X3: Type: IMPLANTABLE DEVICE | Site: KNEE | Status: FUNCTIONAL

## 2017-02-14 DEVICE — LEGION CONSTRAINED ARTICULAR                                    INSERT 9MM SIZE 3-4
Type: IMPLANTABLE DEVICE | Site: KNEE | Status: FUNCTIONAL
Brand: LEGION

## 2017-02-14 RX ORDER — PREGABALIN 50 MG/1
50 CAPSULE ORAL 2 TIMES DAILY
Status: DISCONTINUED | OUTPATIENT
Start: 2017-02-14 | End: 2017-02-17 | Stop reason: HOSPADM

## 2017-02-14 RX ORDER — PANTOPRAZOLE SODIUM 40 MG/1
40 TABLET, DELAYED RELEASE ORAL
Status: DISCONTINUED | OUTPATIENT
Start: 2017-02-15 | End: 2017-02-17 | Stop reason: HOSPADM

## 2017-02-14 RX ORDER — INSULIN LISPRO 100 [IU]/ML
INJECTION, SOLUTION INTRAVENOUS; SUBCUTANEOUS ONCE
Status: DISCONTINUED | OUTPATIENT
Start: 2017-02-14 | End: 2017-02-14 | Stop reason: HOSPADM

## 2017-02-14 RX ORDER — SUCCINYLCHOLINE CHLORIDE 20 MG/ML
INJECTION INTRAMUSCULAR; INTRAVENOUS AS NEEDED
Status: DISCONTINUED | OUTPATIENT
Start: 2017-02-14 | End: 2017-02-14 | Stop reason: HOSPADM

## 2017-02-14 RX ORDER — ACETAMINOPHEN 500 MG
1000 TABLET ORAL ONCE
Status: COMPLETED | OUTPATIENT
Start: 2017-02-14 | End: 2017-02-14

## 2017-02-14 RX ORDER — BUPIVACAINE HYDROCHLORIDE 5 MG/ML
INJECTION, SOLUTION EPIDURAL; INTRACAUDAL AS NEEDED
Status: DISCONTINUED | OUTPATIENT
Start: 2017-02-14 | End: 2017-02-14 | Stop reason: HOSPADM

## 2017-02-14 RX ORDER — OXYCODONE HCL 20 MG/1
20 TABLET, FILM COATED, EXTENDED RELEASE ORAL EVERY 12 HOURS
Status: DISCONTINUED | OUTPATIENT
Start: 2017-02-14 | End: 2017-02-17 | Stop reason: HOSPADM

## 2017-02-14 RX ORDER — GLYCOPYRROLATE 0.2 MG/ML
INJECTION INTRAMUSCULAR; INTRAVENOUS AS NEEDED
Status: DISCONTINUED | OUTPATIENT
Start: 2017-02-14 | End: 2017-02-14 | Stop reason: HOSPADM

## 2017-02-14 RX ORDER — MAGNESIUM SULFATE 100 %
4 CRYSTALS MISCELLANEOUS AS NEEDED
Status: DISCONTINUED | OUTPATIENT
Start: 2017-02-14 | End: 2017-02-14 | Stop reason: HOSPADM

## 2017-02-14 RX ORDER — FENTANYL CITRATE 50 UG/ML
INJECTION, SOLUTION INTRAMUSCULAR; INTRAVENOUS AS NEEDED
Status: DISCONTINUED | OUTPATIENT
Start: 2017-02-14 | End: 2017-02-14 | Stop reason: HOSPADM

## 2017-02-14 RX ORDER — PREGABALIN 75 MG/1
75 CAPSULE ORAL ONCE
Status: COMPLETED | OUTPATIENT
Start: 2017-02-14 | End: 2017-02-14

## 2017-02-14 RX ORDER — ACETAMINOPHEN 500 MG
1000 TABLET ORAL
Status: DISPENSED | OUTPATIENT
Start: 2017-02-14 | End: 2017-02-14

## 2017-02-14 RX ORDER — IPRATROPIUM BROMIDE AND ALBUTEROL SULFATE 2.5; .5 MG/3ML; MG/3ML
3 SOLUTION RESPIRATORY (INHALATION)
Status: DISCONTINUED | OUTPATIENT
Start: 2017-02-14 | End: 2017-02-17 | Stop reason: HOSPADM

## 2017-02-14 RX ORDER — DIPHENHYDRAMINE HYDROCHLORIDE 50 MG/ML
12.5 INJECTION, SOLUTION INTRAMUSCULAR; INTRAVENOUS
Status: DISCONTINUED | OUTPATIENT
Start: 2017-02-14 | End: 2017-02-17 | Stop reason: HOSPADM

## 2017-02-14 RX ORDER — INSULIN LISPRO 100 [IU]/ML
INJECTION, SOLUTION INTRAVENOUS; SUBCUTANEOUS
Status: DISCONTINUED | OUTPATIENT
Start: 2017-02-14 | End: 2017-02-17 | Stop reason: HOSPADM

## 2017-02-14 RX ORDER — NALOXONE HYDROCHLORIDE 0.4 MG/ML
0.4 INJECTION, SOLUTION INTRAMUSCULAR; INTRAVENOUS; SUBCUTANEOUS AS NEEDED
Status: DISCONTINUED | OUTPATIENT
Start: 2017-02-14 | End: 2017-02-14 | Stop reason: HOSPADM

## 2017-02-14 RX ORDER — SODIUM CHLORIDE, SODIUM LACTATE, POTASSIUM CHLORIDE, CALCIUM CHLORIDE 600; 310; 30; 20 MG/100ML; MG/100ML; MG/100ML; MG/100ML
75 INJECTION, SOLUTION INTRAVENOUS CONTINUOUS
Status: DISCONTINUED | OUTPATIENT
Start: 2017-02-14 | End: 2017-02-14 | Stop reason: HOSPADM

## 2017-02-14 RX ORDER — ONDANSETRON 2 MG/ML
4 INJECTION INTRAMUSCULAR; INTRAVENOUS ONCE
Status: DISCONTINUED | OUTPATIENT
Start: 2017-02-14 | End: 2017-02-14 | Stop reason: HOSPADM

## 2017-02-14 RX ORDER — LORAZEPAM 1 MG/1
1 TABLET ORAL
Status: DISCONTINUED | OUTPATIENT
Start: 2017-02-14 | End: 2017-02-17 | Stop reason: HOSPADM

## 2017-02-14 RX ORDER — PRAVASTATIN SODIUM 20 MG/1
20 TABLET ORAL
Status: DISCONTINUED | OUTPATIENT
Start: 2017-02-14 | End: 2017-02-17 | Stop reason: HOSPADM

## 2017-02-14 RX ORDER — OXYBUTYNIN CHLORIDE 5 MG/1
10 TABLET, EXTENDED RELEASE ORAL DAILY
Status: DISCONTINUED | OUTPATIENT
Start: 2017-02-14 | End: 2017-02-17 | Stop reason: HOSPADM

## 2017-02-14 RX ORDER — ROPIVACAINE HYDROCHLORIDE 2 MG/ML
30 INJECTION, SOLUTION EPIDURAL; INFILTRATION; PERINEURAL
Status: COMPLETED | OUTPATIENT
Start: 2017-02-14 | End: 2017-02-14

## 2017-02-14 RX ORDER — EPHEDRINE SULFATE/0.9% NACL/PF 25 MG/5 ML
SYRINGE (ML) INTRAVENOUS AS NEEDED
Status: DISCONTINUED | OUTPATIENT
Start: 2017-02-14 | End: 2017-02-14 | Stop reason: HOSPADM

## 2017-02-14 RX ORDER — MIDAZOLAM HYDROCHLORIDE 1 MG/ML
2 INJECTION, SOLUTION INTRAMUSCULAR; INTRAVENOUS ONCE
Status: COMPLETED | OUTPATIENT
Start: 2017-02-14 | End: 2017-02-14

## 2017-02-14 RX ORDER — LIDOCAINE HYDROCHLORIDE 20 MG/ML
INJECTION, SOLUTION EPIDURAL; INFILTRATION; INTRACAUDAL; PERINEURAL AS NEEDED
Status: DISCONTINUED | OUTPATIENT
Start: 2017-02-14 | End: 2017-02-14 | Stop reason: HOSPADM

## 2017-02-14 RX ORDER — CEFAZOLIN SODIUM 2 G/50ML
2 SOLUTION INTRAVENOUS
Status: COMPLETED | OUTPATIENT
Start: 2017-02-14 | End: 2017-02-14

## 2017-02-14 RX ORDER — METOPROLOL SUCCINATE 50 MG/1
50 TABLET, EXTENDED RELEASE ORAL DAILY
Status: DISCONTINUED | OUTPATIENT
Start: 2017-02-14 | End: 2017-02-17 | Stop reason: HOSPADM

## 2017-02-14 RX ORDER — EPINEPHRINE 1 MG/ML
INJECTION, SOLUTION, CONCENTRATE INTRAVENOUS AS NEEDED
Status: DISCONTINUED | OUTPATIENT
Start: 2017-02-14 | End: 2017-02-14 | Stop reason: HOSPADM

## 2017-02-14 RX ORDER — OXYCODONE HCL 20 MG/1
20 TABLET, FILM COATED, EXTENDED RELEASE ORAL ONCE
Status: COMPLETED | OUTPATIENT
Start: 2017-02-14 | End: 2017-02-14

## 2017-02-14 RX ORDER — FENTANYL CITRATE 50 UG/ML
100 INJECTION, SOLUTION INTRAMUSCULAR; INTRAVENOUS ONCE
Status: COMPLETED | OUTPATIENT
Start: 2017-02-14 | End: 2017-02-14

## 2017-02-14 RX ORDER — PROPOFOL 10 MG/ML
INJECTION, EMULSION INTRAVENOUS AS NEEDED
Status: DISCONTINUED | OUTPATIENT
Start: 2017-02-14 | End: 2017-02-14 | Stop reason: HOSPADM

## 2017-02-14 RX ORDER — VALSARTAN 160 MG/1
160 TABLET ORAL DAILY
Status: DISCONTINUED | OUTPATIENT
Start: 2017-02-15 | End: 2017-02-17 | Stop reason: HOSPADM

## 2017-02-14 RX ORDER — VANCOMYCIN HYDROCHLORIDE 1 G/20ML
INJECTION, POWDER, LYOPHILIZED, FOR SOLUTION INTRAVENOUS AS NEEDED
Status: DISCONTINUED | OUTPATIENT
Start: 2017-02-14 | End: 2017-02-14 | Stop reason: HOSPADM

## 2017-02-14 RX ORDER — HYDROMORPHONE HYDROCHLORIDE 2 MG/ML
0.5 INJECTION, SOLUTION INTRAMUSCULAR; INTRAVENOUS; SUBCUTANEOUS
Status: DISCONTINUED | OUTPATIENT
Start: 2017-02-14 | End: 2017-02-17 | Stop reason: HOSPADM

## 2017-02-14 RX ORDER — MAGNESIUM SULFATE 100 %
4 CRYSTALS MISCELLANEOUS AS NEEDED
Status: DISCONTINUED | OUTPATIENT
Start: 2017-02-14 | End: 2017-02-17 | Stop reason: HOSPADM

## 2017-02-14 RX ORDER — NEOSTIGMINE METHYLSULFATE 1 MG/ML
INJECTION INTRAVENOUS AS NEEDED
Status: DISCONTINUED | OUTPATIENT
Start: 2017-02-14 | End: 2017-02-14 | Stop reason: HOSPADM

## 2017-02-14 RX ORDER — IPRATROPIUM BROMIDE AND ALBUTEROL SULFATE 2.5; .5 MG/3ML; MG/3ML
3 SOLUTION RESPIRATORY (INHALATION)
Status: DISCONTINUED | OUTPATIENT
Start: 2017-02-14 | End: 2017-02-14

## 2017-02-14 RX ORDER — ACETAMINOPHEN 325 MG/1
650 TABLET ORAL
Status: DISCONTINUED | OUTPATIENT
Start: 2017-02-14 | End: 2017-02-17 | Stop reason: HOSPADM

## 2017-02-14 RX ORDER — HYDROCHLOROTHIAZIDE 25 MG/1
12.5 TABLET ORAL DAILY
Status: DISCONTINUED | OUTPATIENT
Start: 2017-02-15 | End: 2017-02-17 | Stop reason: HOSPADM

## 2017-02-14 RX ORDER — ROCURONIUM BROMIDE 10 MG/ML
INJECTION, SOLUTION INTRAVENOUS AS NEEDED
Status: DISCONTINUED | OUTPATIENT
Start: 2017-02-14 | End: 2017-02-14 | Stop reason: HOSPADM

## 2017-02-14 RX ORDER — SODIUM CHLORIDE 0.9 % (FLUSH) 0.9 %
5-10 SYRINGE (ML) INJECTION AS NEEDED
Status: DISCONTINUED | OUTPATIENT
Start: 2017-02-14 | End: 2017-02-17 | Stop reason: HOSPADM

## 2017-02-14 RX ORDER — LIDOCAINE HYDROCHLORIDE 10 MG/ML
3 INJECTION, SOLUTION EPIDURAL; INFILTRATION; INTRACAUDAL; PERINEURAL ONCE
Status: COMPLETED | OUTPATIENT
Start: 2017-02-14 | End: 2017-02-14

## 2017-02-14 RX ORDER — NALOXONE HYDROCHLORIDE 0.4 MG/ML
0.4 INJECTION, SOLUTION INTRAMUSCULAR; INTRAVENOUS; SUBCUTANEOUS AS NEEDED
Status: DISCONTINUED | OUTPATIENT
Start: 2017-02-14 | End: 2017-02-17 | Stop reason: HOSPADM

## 2017-02-14 RX ORDER — WARFARIN 10 MG/1
10 TABLET ORAL ONCE
Status: COMPLETED | OUTPATIENT
Start: 2017-02-14 | End: 2017-02-14

## 2017-02-14 RX ORDER — PREGABALIN 75 MG/1
75 CAPSULE ORAL
Status: DISPENSED | OUTPATIENT
Start: 2017-02-14 | End: 2017-02-14

## 2017-02-14 RX ORDER — FAMOTIDINE 20 MG/1
20 TABLET, FILM COATED ORAL ONCE
Status: COMPLETED | OUTPATIENT
Start: 2017-02-14 | End: 2017-02-14

## 2017-02-14 RX ORDER — ONDANSETRON 2 MG/ML
INJECTION INTRAMUSCULAR; INTRAVENOUS AS NEEDED
Status: DISCONTINUED | OUTPATIENT
Start: 2017-02-14 | End: 2017-02-14 | Stop reason: HOSPADM

## 2017-02-14 RX ORDER — ALBUTEROL SULFATE 0.83 MG/ML
2.5 SOLUTION RESPIRATORY (INHALATION) AS NEEDED
Status: DISCONTINUED | OUTPATIENT
Start: 2017-02-14 | End: 2017-02-14 | Stop reason: HOSPADM

## 2017-02-14 RX ORDER — CEFAZOLIN SODIUM 2 G/50ML
2 SOLUTION INTRAVENOUS EVERY 8 HOURS
Status: COMPLETED | OUTPATIENT
Start: 2017-02-14 | End: 2017-02-15

## 2017-02-14 RX ORDER — POLYMYXIN B 500000 [USP'U]/1
INJECTION, POWDER, LYOPHILIZED, FOR SOLUTION INTRAMUSCULAR; INTRATHECAL; INTRAVENOUS; OPHTHALMIC AS NEEDED
Status: DISCONTINUED | OUTPATIENT
Start: 2017-02-14 | End: 2017-02-14 | Stop reason: HOSPADM

## 2017-02-14 RX ORDER — LANOLIN ALCOHOL/MO/W.PET/CERES
1 CREAM (GRAM) TOPICAL 2 TIMES DAILY WITH MEALS
Status: DISCONTINUED | OUTPATIENT
Start: 2017-02-14 | End: 2017-02-17 | Stop reason: HOSPADM

## 2017-02-14 RX ORDER — SODIUM CHLORIDE 0.9 % (FLUSH) 0.9 %
5-10 SYRINGE (ML) INJECTION AS NEEDED
Status: DISCONTINUED | OUTPATIENT
Start: 2017-02-14 | End: 2017-02-14 | Stop reason: HOSPADM

## 2017-02-14 RX ORDER — SODIUM CHLORIDE 0.9 % (FLUSH) 0.9 %
5-10 SYRINGE (ML) INJECTION EVERY 8 HOURS
Status: DISCONTINUED | OUTPATIENT
Start: 2017-02-14 | End: 2017-02-17 | Stop reason: HOSPADM

## 2017-02-14 RX ORDER — SODIUM CHLORIDE, SODIUM LACTATE, POTASSIUM CHLORIDE, CALCIUM CHLORIDE 600; 310; 30; 20 MG/100ML; MG/100ML; MG/100ML; MG/100ML
50 INJECTION, SOLUTION INTRAVENOUS CONTINUOUS
Status: DISCONTINUED | OUTPATIENT
Start: 2017-02-14 | End: 2017-02-14 | Stop reason: HOSPADM

## 2017-02-14 RX ORDER — ONDANSETRON 2 MG/ML
4 INJECTION INTRAMUSCULAR; INTRAVENOUS
Status: DISCONTINUED | OUTPATIENT
Start: 2017-02-14 | End: 2017-02-17 | Stop reason: HOSPADM

## 2017-02-14 RX ORDER — OXYCODONE AND ACETAMINOPHEN 7.5; 325 MG/1; MG/1
1-2 TABLET ORAL
Status: DISCONTINUED | OUTPATIENT
Start: 2017-02-14 | End: 2017-02-17 | Stop reason: HOSPADM

## 2017-02-14 RX ORDER — DEXTROSE 50 % IN WATER (D50W) INTRAVENOUS SYRINGE
25-50 AS NEEDED
Status: DISCONTINUED | OUTPATIENT
Start: 2017-02-14 | End: 2017-02-17 | Stop reason: HOSPADM

## 2017-02-14 RX ORDER — SODIUM CHLORIDE 0.9 % (FLUSH) 0.9 %
5-10 SYRINGE (ML) INJECTION EVERY 8 HOURS
Status: DISCONTINUED | OUTPATIENT
Start: 2017-02-14 | End: 2017-02-14 | Stop reason: HOSPADM

## 2017-02-14 RX ORDER — OXYCODONE HCL 20 MG/1
20 TABLET, FILM COATED, EXTENDED RELEASE ORAL EVERY 12 HOURS
Status: DISCONTINUED | OUTPATIENT
Start: 2017-02-14 | End: 2017-02-14 | Stop reason: HOSPADM

## 2017-02-14 RX ORDER — ZOLPIDEM TARTRATE 5 MG/1
5 TABLET ORAL
Status: DISCONTINUED | OUTPATIENT
Start: 2017-02-14 | End: 2017-02-17 | Stop reason: HOSPADM

## 2017-02-14 RX ORDER — DIPHENHYDRAMINE HYDROCHLORIDE 50 MG/ML
12.5 INJECTION, SOLUTION INTRAMUSCULAR; INTRAVENOUS
Status: DISCONTINUED | OUTPATIENT
Start: 2017-02-14 | End: 2017-02-14 | Stop reason: HOSPADM

## 2017-02-14 RX ORDER — DEXTROSE 50 % IN WATER (D50W) INTRAVENOUS SYRINGE
25-50 AS NEEDED
Status: DISCONTINUED | OUTPATIENT
Start: 2017-02-14 | End: 2017-02-14 | Stop reason: HOSPADM

## 2017-02-14 RX ORDER — DEXAMETHASONE SODIUM PHOSPHATE 4 MG/ML
INJECTION, SOLUTION INTRA-ARTICULAR; INTRALESIONAL; INTRAMUSCULAR; INTRAVENOUS; SOFT TISSUE AS NEEDED
Status: DISCONTINUED | OUTPATIENT
Start: 2017-02-14 | End: 2017-02-14 | Stop reason: HOSPADM

## 2017-02-14 RX ORDER — AMLODIPINE BESYLATE 10 MG/1
10 TABLET ORAL DAILY
Status: DISCONTINUED | OUTPATIENT
Start: 2017-02-14 | End: 2017-02-17 | Stop reason: HOSPADM

## 2017-02-14 RX ORDER — KETOROLAC TROMETHAMINE 30 MG/ML
INJECTION, SOLUTION INTRAMUSCULAR; INTRAVENOUS AS NEEDED
Status: DISCONTINUED | OUTPATIENT
Start: 2017-02-14 | End: 2017-02-14 | Stop reason: HOSPADM

## 2017-02-14 RX ORDER — SODIUM CHLORIDE 9 MG/ML
100 INJECTION, SOLUTION INTRAVENOUS CONTINUOUS
Status: DISPENSED | OUTPATIENT
Start: 2017-02-14 | End: 2017-02-15

## 2017-02-14 RX ORDER — ADHESIVE BANDAGE
30 BANDAGE TOPICAL DAILY PRN
Status: DISCONTINUED | OUTPATIENT
Start: 2017-02-14 | End: 2017-02-17 | Stop reason: HOSPADM

## 2017-02-14 RX ADMIN — ONDANSETRON 4 MG: 2 INJECTION INTRAMUSCULAR; INTRAVENOUS at 07:41

## 2017-02-14 RX ADMIN — ROCURONIUM BROMIDE 35 MG: 10 INJECTION, SOLUTION INTRAVENOUS at 07:55

## 2017-02-14 RX ADMIN — SODIUM CHLORIDE, SODIUM LACTATE, POTASSIUM CHLORIDE, AND CALCIUM CHLORIDE: 600; 310; 30; 20 INJECTION, SOLUTION INTRAVENOUS at 07:41

## 2017-02-14 RX ADMIN — TRANEXAMIC ACID 1 G: 100 INJECTION, SOLUTION INTRAVENOUS at 09:22

## 2017-02-14 RX ADMIN — DEXAMETHASONE SODIUM PHOSPHATE 4 MG: 4 INJECTION, SOLUTION INTRA-ARTICULAR; INTRALESIONAL; INTRAMUSCULAR; INTRAVENOUS; SOFT TISSUE at 07:47

## 2017-02-14 RX ADMIN — EPINEPHRINE 0.5 MG: 1 INJECTION, SOLUTION INTRAMUSCULAR; SUBCUTANEOUS at 08:24

## 2017-02-14 RX ADMIN — LIDOCAINE HYDROCHLORIDE 50 MG: 20 INJECTION, SOLUTION EPIDURAL; INFILTRATION; INTRACAUDAL; PERINEURAL at 09:26

## 2017-02-14 RX ADMIN — SUCCINYLCHOLINE CHLORIDE 120 MG: 20 INJECTION INTRAMUSCULAR; INTRAVENOUS at 07:47

## 2017-02-14 RX ADMIN — Medication 10 MG: at 09:01

## 2017-02-14 RX ADMIN — FENTANYL CITRATE 100 MCG: 50 INJECTION, SOLUTION INTRAMUSCULAR; INTRAVENOUS at 07:47

## 2017-02-14 RX ADMIN — SODIUM CHLORIDE 50 ML: 9 INJECTION, SOLUTION INTRAVENOUS at 08:22

## 2017-02-14 RX ADMIN — VANCOMYCIN HYDROCHLORIDE 3 G: 1 INJECTION, POWDER, LYOPHILIZED, FOR SOLUTION INTRAVENOUS at 08:22

## 2017-02-14 RX ADMIN — PREGABALIN 50 MG: 50 CAPSULE ORAL at 18:02

## 2017-02-14 RX ADMIN — OXYCODONE HYDROCHLORIDE 20 MG: 20 TABLET, FILM COATED, EXTENDED RELEASE ORAL at 21:49

## 2017-02-14 RX ADMIN — KETOROLAC TROMETHAMINE 30 MG: 30 INJECTION, SOLUTION INTRAMUSCULAR; INTRAVENOUS at 08:24

## 2017-02-14 RX ADMIN — GLYCOPYRROLATE 0.6 MG: 0.2 INJECTION INTRAMUSCULAR; INTRAVENOUS at 09:34

## 2017-02-14 RX ADMIN — ROCURONIUM BROMIDE 5 MG: 10 INJECTION, SOLUTION INTRAVENOUS at 07:47

## 2017-02-14 RX ADMIN — SODIUM CHLORIDE, SODIUM LACTATE, POTASSIUM CHLORIDE, AND CALCIUM CHLORIDE 75 ML/HR: 600; 310; 30; 20 INJECTION, SOLUTION INTRAVENOUS at 06:51

## 2017-02-14 RX ADMIN — POLYMYXIN B SULFATE 750000 UNITS: 500000 INJECTION, POWDER, LYOPHILIZED, FOR SOLUTION INTRAMUSCULAR; INTRATHECAL; INTRAVENOUS; OPHTHALMIC at 08:21

## 2017-02-14 RX ADMIN — PROPOFOL 150 MG: 10 INJECTION, EMULSION INTRAVENOUS at 07:47

## 2017-02-14 RX ADMIN — SODIUM CHLORIDE 100 ML/HR: 900 INJECTION, SOLUTION INTRAVENOUS at 23:52

## 2017-02-14 RX ADMIN — OXYBUTYNIN CHLORIDE 10 MG: 5 TABLET, FILM COATED, EXTENDED RELEASE ORAL at 13:11

## 2017-02-14 RX ADMIN — Medication 10 ML: at 14:25

## 2017-02-14 RX ADMIN — FENTANYL CITRATE 50 MCG: 50 INJECTION, SOLUTION INTRAMUSCULAR; INTRAVENOUS at 09:27

## 2017-02-14 RX ADMIN — TRANEXAMIC ACID 1 G: 100 INJECTION, SOLUTION INTRAVENOUS at 07:41

## 2017-02-14 RX ADMIN — GLYCOPYRROLATE 0.2 MG: 0.2 INJECTION INTRAMUSCULAR; INTRAVENOUS at 07:41

## 2017-02-14 RX ADMIN — ROPIVACAINE HYDROCHLORIDE 60 MG: 2 INJECTION, SOLUTION EPIDURAL; INFILTRATION at 07:20

## 2017-02-14 RX ADMIN — MIDAZOLAM HYDROCHLORIDE 2 MG: 1 INJECTION, SOLUTION INTRAMUSCULAR; INTRAVENOUS at 07:13

## 2017-02-14 RX ADMIN — BUPIVACAINE 266 MG: 13.3 INJECTION, SUSPENSION, LIPOSOMAL INFILTRATION at 08:38

## 2017-02-14 RX ADMIN — Medication 10 ML: at 21:50

## 2017-02-14 RX ADMIN — FAMOTIDINE 20 MG: 20 TABLET ORAL at 06:33

## 2017-02-14 RX ADMIN — CEFAZOLIN SODIUM 2 G: 2 SOLUTION INTRAVENOUS at 23:52

## 2017-02-14 RX ADMIN — LIDOCAINE HYDROCHLORIDE 1 ML: 10 INJECTION, SOLUTION EPIDURAL; INFILTRATION; INTRACAUDAL; PERINEURAL at 07:37

## 2017-02-14 RX ADMIN — INSULIN LISPRO 4 UNITS: 100 INJECTION, SOLUTION INTRAVENOUS; SUBCUTANEOUS at 21:56

## 2017-02-14 RX ADMIN — SODIUM CHLORIDE 250 ML: 9 INJECTION, SOLUTION INTRAVENOUS at 08:25

## 2017-02-14 RX ADMIN — SODIUM CHLORIDE 100 ML/HR: 900 INJECTION, SOLUTION INTRAVENOUS at 12:08

## 2017-02-14 RX ADMIN — INSULIN LISPRO 4 UNITS: 100 INJECTION, SOLUTION INTRAVENOUS; SUBCUTANEOUS at 18:02

## 2017-02-14 RX ADMIN — SODIUM CHLORIDE, SODIUM LACTATE, POTASSIUM CHLORIDE, AND CALCIUM CHLORIDE: 600; 310; 30; 20 INJECTION, SOLUTION INTRAVENOUS at 08:33

## 2017-02-14 RX ADMIN — FENTANYL CITRATE 50 MCG: 50 INJECTION, SOLUTION INTRAMUSCULAR; INTRAVENOUS at 07:56

## 2017-02-14 RX ADMIN — FENTANYL CITRATE 100 MCG: 50 INJECTION INTRAMUSCULAR; INTRAVENOUS at 07:13

## 2017-02-14 RX ADMIN — BUPIVACAINE HYDROCHLORIDE 25 ML: 5 INJECTION, SOLUTION EPIDURAL; INTRACAUDAL at 08:23

## 2017-02-14 RX ADMIN — PREGABALIN 75 MG: 75 CAPSULE ORAL at 06:33

## 2017-02-14 RX ADMIN — Medication 325 MG: at 18:02

## 2017-02-14 RX ADMIN — CEFAZOLIN SODIUM 2 G: 2 SOLUTION INTRAVENOUS at 07:41

## 2017-02-14 RX ADMIN — LIDOCAINE HYDROCHLORIDE 50 MG: 20 INJECTION, SOLUTION EPIDURAL; INFILTRATION; INTRACAUDAL; PERINEURAL at 07:47

## 2017-02-14 RX ADMIN — CEFAZOLIN SODIUM 2 G: 2 SOLUTION INTRAVENOUS at 16:32

## 2017-02-14 RX ADMIN — PRAVASTATIN SODIUM 20 MG: 20 TABLET ORAL at 21:50

## 2017-02-14 RX ADMIN — FENTANYL CITRATE 50 MCG: 50 INJECTION, SOLUTION INTRAMUSCULAR; INTRAVENOUS at 09:52

## 2017-02-14 RX ADMIN — FENTANYL CITRATE 50 MCG: 50 INJECTION, SOLUTION INTRAMUSCULAR; INTRAVENOUS at 08:41

## 2017-02-14 RX ADMIN — NEOSTIGMINE METHYLSULFATE 4 MG: 1 INJECTION INTRAVENOUS at 09:34

## 2017-02-14 RX ADMIN — WARFARIN SODIUM 10 MG: 10 TABLET ORAL at 06:33

## 2017-02-14 RX ADMIN — ACETAMINOPHEN 1000 MG: 500 TABLET, FILM COATED ORAL at 06:34

## 2017-02-14 RX ADMIN — OXYCODONE HYDROCHLORIDE 20 MG: 20 TABLET, FILM COATED, EXTENDED RELEASE ORAL at 06:34

## 2017-02-14 NOTE — PERIOP NOTES
TRANSFER - OUT REPORT:    Verbal report given to Close RN on Lance Delgado  being transferred to  for routine post - op       Report consisted of patients Situation, Background, Assessment and   Recommendations(SBAR). Information from the following report(s) SBAR and MAR was reviewed with the receiving nurse. Lines:   Peripheral IV 02/14/17 Right Wrist (Active)   Site Assessment Clean, dry, & intact 2/14/2017  9:52 AM   Phlebitis Assessment 0 2/14/2017  9:52 AM   Infiltration Assessment 0 2/14/2017  9:52 AM   Dressing Status Clean, dry, & intact 2/14/2017  9:52 AM   Dressing Type Tape;Transparent 2/14/2017  9:52 AM   Hub Color/Line Status Pink; Infusing 2/14/2017  9:52 AM        Opportunity for questions and clarification was provided.       Patient transported with:   O2 @ 2 liters

## 2017-02-14 NOTE — IP AVS SNAPSHOT
[address] Patient: Mansoor Palacios MRN: [id number] YBL:[id number] You are allergic to the following Allergen Reactions Nsaids (Non-Steroidal Anti-Inflammatory Drug) Other (comments) GI Bleed Aspirin Other (comments) Gi bleed Recent Documentation Height Weight BMI OB Status Smoking Status 1.626 m 80.4 kg 30.42 kg/m2 Menopause Former Smoker Emergency Contacts Name Discharge Info Relation Home Work Mobile [facility] DISCHARGE CAREGIVER [3] Son [22] [phone] About your hospitalization You were admitted on:  February 14, 2017 You last received care in the:  [facility] [address] You were discharged on:  February 16, 2017 Unit phone number:  [phone] Why you were hospitalized Your primary diagnosis was:  Not on File Your diagnoses also included: Arthritis Of Knee Providers Seen During Your Hospitalizations Provider Role Specialty Primary office phone Shane Mayes MD Attending Provider Orthopedic Surgery [phone] Your Primary Care Physician (PCP) Primary Care Physician Office Phone Office Fax Raul Kim [phone] Follow-up Information Follow up With Details Comments Contact Info Pema Harper MD On 2/22/2017 February 22, 2017 @ 08:30 with Dr. Fazal Hansen. [address] [place] [address] [facility] [id number] [phone] Erin Campo PA-C On 3/2/2017 March 2, 2017 @ 09:45 with Zee Porter. [address] [place] [address] 
[phone] Your Appointments Wednesday February 22, 2017  8:30 AM EST TRANSITIONAL CARE MANAGEMENT with MD Hilda Waddell 47 ([facility]) [address] [place] [address] [facility] [phone] [id number] [phone] Thursday March 02, 2017  9:45 AM EST POST OP with Leon Doe PA-C  
[facility] - V[facility]ctor Cavazos [facility]) [address] [facility], [address] [place] [address] [phone] Current Discharge Medication List  
  
START taking these medications Dose & Instructions Dispensing Information Comments Morning Noon Evening Bedtime  
 ferrous sulfate 325 mg (65 mg iron) tablet Your next dose is: Today, Tomorrow Other:  _________ Dose:  325 mg Take 1 Tab by mouth two (2) times daily (with meals). Quantity:  60 Tab Refills:  2  
     
   
   
   
  
 oxyCODONE-acetaminophen 7.5-325 mg per tablet Commonly known as:  PERCOCET 7.5 Your next dose is: Today, Tomorrow Other:  _________ Dose:  1-2 Tab Take 1-2 Tabs by mouth every four (4) hours as needed. Max Daily Amount: 12 Tabs. Quantity:  60 Tab Refills:  0  
     
   
   
   
  
 warfarin 3 mg tablet Commonly known as:  COUMADIN Your next dose is: Today, Tomorrow Other:  _________ Dose:  3 mg Take 1 Tab by mouth daily for 30 days. Quantity:  30 Tab Refills:  0 CONTINUE these medications which have CHANGED Dose & Instructions Dispensing Information Comments Morning Noon Evening Bedtime  
 fexofenadine 180 mg tablet Commonly known as:  Gisellkeren Guerrero What changed:   
- when to take this 
- reasons to take this Your next dose is: Today, Tomorrow Other:  _________ Dose:  180 mg Take 1 Tab by mouth daily. Quantity:  90 Tab Refills:  3  
     
   
   
   
  
 metFORMIN 500 mg tablet Commonly known as:  GLUCOPHAGE What changed:  how much to take Your next dose is: Today, Tomorrow Other:  _________ Dose:  750 mg Take 1.5 Tabs by mouth two (2) times daily (with meals). Quantity:  180 Tab Refills:  3 CONTINUE these medications which have NOT CHANGED Dose & Instructions Dispensing Information Comments Morning Noon Evening Bedtime  
 amLODIPine 10 mg tablet Commonly known as:  Yoandy Toribio Your next dose is: Today, Tomorrow Other:  _________ Dose:  10 mg Take 1 Tab by mouth daily. Quantity:  90 Tab Refills:  3  
     
   
   
   
  
 colchicine 0.6 mg tablet Your next dose is: Today, Tomorrow Other:  _________ Dose:  0.6 mg Take 1 Tab by mouth daily. Quantity:  90 Tab Refills:  0 CRANBERRY CONCENTRATE PO Your next dose is: Today, Tomorrow Other:  _________ Dose:  2 Tab Take 2 Tabs by mouth daily. Refills:  0  
     
   
   
   
  
 febuxostat 40 mg Tab tablet Commonly known as:  Francis Nadeem Your next dose is: Today, Tomorrow Other:  _________ Dose:  40 mg Take 1 Tab by mouth daily. Quantity:  90 Tab Refills:  3  
     
   
   
   
  
 levoFLOXacin 500 mg tablet Commonly known as:  Thersia Bring Your next dose is: Today, Tomorrow Other:  _________ 1 po q day x 5 days Quantity:  5 Tab Refills:  0 LORazepam 1 mg tablet Commonly known as:  ATIVAN Your next dose is: Today, Tomorrow Other:  _________ Dose:  1 mg Take 1 Tab by mouth every eight (8) hours as needed. Max Daily Amount: 3 mg. Indications: ANXIETY Quantity:  30 Tab Refills:  0  
     
   
   
   
  
 melatonin 3 mg tablet Your next dose is: Today, Tomorrow Other:  _________ Dose:  3 mg Take 1 Tab by mouth nightly. Quantity:  90 Tab Refills:  3  
     
   
   
   
  
 metoprolol succinate 50 mg XL tablet Commonly known as:  TOPROL-XL Your next dose is: Today, Tomorrow Other:  _________ Dose:  50 mg Take 1 Tab by mouth daily. Quantity:  90 Tab Refills:  3 oxybutynin chloride XL 10 mg CR tablet Commonly known as:  DITROPAN XL Your next dose is: Today, Tomorrow Other:  _________ Dose:  10 mg Take 1 Tab by mouth daily. Quantity:  90 Tab Refills:  3  
     
   
   
   
  
 pantoprazole 40 mg tablet Commonly known as:  PROTONIX Your next dose is: Today, Tomorrow Other:  _________ Dose:  40 mg Take 40 mg by mouth daily as needed. Refills:  0  
     
   
   
   
  
 pravastatin 20 mg tablet Commonly known as:  PRAVACHOL Your next dose is: Today, Tomorrow Other:  _________ Dose:  20 mg Take 1 Tab by mouth nightly. Quantity:  90 Tab Refills:  3  
     
   
   
   
  
 senna 8.6 mg tablet Commonly known as:  Candelario Hebert Your next dose is: Today, Tomorrow Other:  _________ Dose:  2 Tab Take 2 Tabs by mouth nightly. Refills:  0  
     
   
   
   
  
 valsartan-hydroCHLOROthiazide 160-12.5 mg per tablet Commonly known as:  DIOVAN-HCT Your next dose is: Today, Tomorrow Other:  _________ Dose:  1 Tab Take 1 Tab by mouth daily. Quantity:  90 Tab Refills:  3 STOP taking these medications   
 traMADol 50 mg tablet Commonly known as:  ULTRAM  
   
  
  
  
Where to Get Your Medications Information on where to get these meds will be given to you by the nurse or doctor. ! Ask your nurse or doctor about these medications  
  ferrous sulfate 325 mg (65 mg iron) tablet  
 oxyCODONE-acetaminophen 7.5-325 mg per tablet  
 warfarin 3 mg tablet Discharge Instructions DISCHARGE SUMMARY from Nurse The following personal items are in your possession at time of discharge: 
 
Dental Appliances: Uppers, Partials (With Mena Grout) Visual Aid: Glasses, At home Clothing: Pants, Shirt, Undergarments, Footwear, Socks (With Mena Grout) Other Valuables: Eyeglasses (Prosthesis and glasses with Dashawn Johnson) PATIENT INSTRUCTIONS: 
 
 
F-face looks uneven A-arms unable to move or move unevenly S-speech slurred or non-existent T-time-call 911 as soon as signs and symptoms begin-DO NOT go Back to bed or wait to see if you get better-TIME IS BRAIN. Warning Signs of HEART ATTACK Call 911 if you have these symptoms: 
? Chest discomfort. Most heart attacks involve discomfort in the center of the chest that lasts more than a few minutes, or that goes away and comes back. It can feel like uncomfortable pressure, squeezing, fullness, or pain. ? Discomfort in other areas of the upper body. Symptoms can include pain or discomfort in one or both arms, the back, neck, jaw, or stomach. ? Shortness of breath with or without chest discomfort. ? Other signs may include breaking out in a cold sweat, nausea, or lightheadedness. Don't wait more than five minutes to call [address]! Fast action can save your life. Calling 911 is almost always the fastest way to get lifesaving treatment. Emergency Medical Services staff can begin treatment when they arrive  up to an hour sooner than if someone gets to the hospital by car. [employer] Activation Thank you for requesting access to [employer]. Please follow the instructions below to securely access and download your online medical record. [employer] allows you to send messages to your doctor, view your test results, renew your prescriptions, schedule appointments, and more. How Do I Sign Up? 1. In your internet browser, go to [email or web address] 
2. Click on the First Time User? Click Here link in the Sign In box. You will be redirect to the New Member Sign Up page. 3. Enter your [employer] Access Code exactly as it appears below.  You will not need to use this code after youve completed the sign-up process. If you do not sign up before the expiration date, you must request a new code. Wealth Access Access Code: Activation code not generated Current Wealth Access Status: Active (This is the date your Wealth Access access code will ) 4. Enter the last four digits of your Social Security Number (xxxx) and Date of Birth (mm/dd/yyyy) as indicated and click Submit. You will be taken to the next sign-up page. 5. Create a Rivet [employer] ID. This will be your Wealth Access login ID and cannot be changed, so think of one that is secure and easy to remember. 6. Create a Wealth Access password. You can change your password at any time. 7. Enter your Password Reset Question and Answer. This can be used at a later time if you forget your password. 8. Enter your e-mail address. You will receive e-mail notification when new information is available in [address]. 9. Click Sign Up. You can now view and download portions of your medical record. 10. Click the Download Summary menu link to download a portable copy of your medical information. Additional Information If you have questions, please visit the Frequently Asked Questions section of the Wealth Access website at [email or web address] Remember, Wealth Access is NOT to be used for urgent needs. For medical emergencies, dial 911. Patient armband removed and shredded The discharge information has been reviewed with the patient. The patient verbalized understanding. Discharge medications reviewed with the patient and appropriate educational materials and side effects teaching were provided. Discharge Orders Procedure Order Date Status Priority Quantity Spec Type Associated Dx WALKER STANDARD 02/15/17 1208 Normal Routine 1  Arthritis of knee [[id number]] ELEVATED TOILET SEAT 02/15/17 1208 Normal Routine 1  Arthritis of knee [[id number]] COMMODE CHAIR 02/15/17 1208 Normal Routine 1  Arthritis of knee [[id number]] SHOWER CHAIR 02/15/17 1208 Normal Routine 1  Arthritis of knee [[id number]] [address] [place] [address] 02/15/17 1208 Normal Routine 1  Arthritis of knee [[id number]] Comments: Total knee protocol, wbat PT/INR mon/thurs 
aquacel ag dressing pod 7 and prn MyChart Announcement We are excited to announce that we are making your provider's discharge notes available to you in [employer]. You will see these notes when they are completed and signed by the physician that discharged you from your recent hospital stay. If you have any questions or concerns about any information you see in [employer], please call the Health Information Department where you were seen or reach out to your Primary Care Provider for more information about your plan of care. Introducing [facility]! Dear Mica Santana: Thank you for requesting a [employer] account. Our records indicate that you already have an active [employer] account. You can access your account anytime at [email or web address] Did you know that you can access your hospital and ER discharge instructions at any time in [employer]? You can also review all of your test results from your hospital stay or ER visit. Additional Information If you have questions, please visit the Frequently Asked Questions section of the [employer] website at [email or web address] Remember, [employer] is NOT to be used for urgent needs. For medical emergencies, dial 911. Now available from your iPhone and Android! General Information Please provide this summary of care documentation to your next provider. Patient Signature:  ____________________________________________________________ Date:  ____________________________________________________________  
  
Luly Almeida  Provider Signature: ____________________________________________________________ Date:  ____________________________________________________________

## 2017-02-14 NOTE — PROGRESS NOTES
Problem: Mobility Impaired (Adult and Pediatric)  Goal: *Acute Goals and Plan of Care (Insert Text)  STGs to be addressed within 3 days:  1. Bed mobility: Supine to sit to supine S with HR for meals. 2. Activity tolerance: Tolerate up in chair 1-2 hrs for ADLs. 3. Transfers: Sit to stand to chair S with LRAD for ADLs. LTGs to be addressed within 7 days:  1. Standing/Ambulation Balance: Increase to Good with LRAD for safe transfers and gait. 2. Ambulation: Ambulate > 200 ft. S with LRAD for home mobility. 3. Patient Education: Independent with HEP for home safety. 4. Stairs: Up/Down 2 steps CGA with HR for home entry. Outcome: Progressing Towards Goal  PHYSICAL THERAPY EVALUATION     Patient: Gretchen Lerner [de-identified]79 y.o. female)  Date: 2/14/2017  Primary Diagnosis: Primary osteoarthritis of right knee [M17.11]  Procedure(s) (LRB):  RIGHT TOTAL KNEE ARTHROPLASTY (Right) Day of Surgery   Precautions: Fall, WBAT (R LE)      ASSESSMENT :  Based on the objective data described below, the patient presents s/p R TKR with decreased functional mobility including bed mobility, transfers, ambulation, stairs, and general activity tolerance. PTA, patient reports ambulating independently, independent with all ADLs, assists with caregiving to  as he is wheelchair bound. Today, patient presents supine in bed with HOB elevated, ice machine on R knee, ACE wrap/bandage on R knee. Patient demonstrated good quad control with supine LE exercises, required moderate verbal cuing for continued performance. Patient transferred supine to sit at EOB with Min A, demonstrated seated R knee AROM 5-90 degrees. Patient required CGA for sit to stand from EOB for safety, ambulated 5 ft with RW and Min A to bedside commode.  Patient required constant support for standing pericare with increased weight bearing on R LE despite consistent cuing to place more weight on L LE for improved safety, required manual assist at R knee to encourage weight shift. Patient ambulated 5 ft to EOB with RW and Min A for management of RW, demonstrated decreased command following with increased fatigue. Patient required Min A to return to supine in bed, HOB elevated with towel roll under R heel, ice machine applied to R knee. Patient trained in correct use of incentive spirometer, encouraged to perform supine LE exercises during waking hours for improved R LE strength and ROM. Patient will benefit from skilled intervention to address the above impairments and facilitate D/C planning. Patients rehabilitation potential is considered to be Good  Factors which may influence rehabilitation potential include:   [ ]         None noted  [ ]         Mental ability/status  [X]         Medical condition  [ ]         Home/family situation and support systems  [ ]         Safety awareness  [X]         Pain tolerance/management  [ ]         Other:        PLAN :  Recommendations and Planned Interventions:  [X]           Bed Mobility Training             [X]    Neuromuscular Re-Education  [X]           Transfer Training                   [ ]    Orthotic/Prosthetic Training  [X]           Gait Training                          [ ]    Modalities  [X]           Therapeutic Exercises          [ ]    Edema Management/Control  [X]           Therapeutic Activities            [X]    Patient and Family Training/Education  [ ]           Other (comment):     Frequency/Duration: Patient will be followed by physical therapy twice daily to address goals. Discharge Recommendations: Home Health  Further Equipment Recommendations for Discharge: rolling walker       SUBJECTIVE:   Patient stated I have to use the commode.       OBJECTIVE DATA SUMMARY:       Past Medical History   Diagnosis Date    Advance directive discussed with patient 8/17/2016    Aspirin intolerance 1/26/2017    Asthma         bronchitis    Cancer ([facility] 1998       Left breast cancer    Cervical radiculopathy      Cigarette nicotine dependence in remission 8/17/2016    DDD (degenerative disc disease), lumbar      Degeneration of cervical intervertebral disc      Degeneration of thoracic intervertebral disc      Diabetes mellitus ([facility]         type 2    Diabetes mellitus type 2, controlled ([facility] [address] 8/2/2016    Essential hypertension with goal blood pressure less than 140/90 8/10/2016    GERD (gastroesophageal reflux disease)      GI bleed 6/29/16    Gout      H. pylori infection 7/11/2016    H/O: GI bleed 1/26/2017    Hammer toe of left foot      Hypercholesterolemia      Hypertension      Lumbago with sciatica      Lumbar radiculopathy      Malignant neoplasm of left female breast ([facility] [address] 8/2/2016    Mixed incontinence 8/10/2016    Obesity, Class I, BMI 30-34.9 8/2/2016    osteoarthritis      Osteoarthrosis, generalized, involving multiple sites      Osteoporosis      Paronychia of finger      Primary insomnia 8/10/2016    Pure hypercholesterolemia 2/9/2017    Sciatica of left side      Scoliosis      Segmental and somatic dysfunction of lumbar region      Segmental and somatic dysfunction of pelvic region      Urinary tract infection      Vertigo      Vitamin B12 deficiency      Vitamin D deficiency      Wound infection       Past Surgical History   Procedure Laterality Date    Hx tubal ligation Bilateral      Hx mastectomy Left 1998     Barriers to Learning/Limitations: None  Compensate with: N/A  Prior Level of Function/Home Situation:  Home Situation  Home Environment: Private residence  # Steps to Enter: 2  Rails to Enter: No  One/Two Story Residence: One story  Living Alone: No  Support Systems: Family member(s)  Patient Expects to be Discharged to[de-identified] Private residence  Current DME Used/Available at Home: luz Jarquin  Critical Behavior:  Neurologic State: Alert;Drowsy  Psychosocial  Patient Behaviors: Calm; Cooperative  Strength:    Strength: Generally decreased, functional (R LE, all else WFL)  Tone & Sensation:   Tone: Normal  Sensation: Intact (B LE to LT)   Range Of Motion:  AROM: Generally decreased, functional (R LE, all else [facility])  Functional Mobility:  Bed Mobility:  Rolling: Supervision  Supine to Sit: Minimum assistance  Sit to Supine: Minimum assistance  Scooting: Contact guard assistance  Transfers:  Sit to Stand: Contact guard assistance  Stand to Sit: Contact guard assistance  Balance:   Sitting: Impaired  Sitting - Static: Fair (occasional)  Sitting - Dynamic: Fair (occasional)  Standing: Impaired  Standing - Static: Fair  Standing - Dynamic : Fair  Ambulation/Gait Training:  Distance (ft): 5 Feet (ft) (X 2)  Assistive Device: Walker, rolling;Gait belt  Ambulation - Level of Assistance: Minimal assistance  Base of Support: Widened  Speed/Nadya: Pace decreased (<100 feet/min); Slow  Therapeutic Exercises:   Supine quad sets, heel slides, SLR X 5  Pain:  Pain Scale 1: Numeric (0 - 10)  Pain Intensity 1: 0  Pain Location 1: Knee  Pain Orientation 1: Right  Pain Description 1: Sharp  Activity Tolerance:   Fair  Please refer to the flowsheet for vital signs taken during this treatment. After treatment:   [ ] Patient left in no apparent distress sitting up in chair  [ ] Patient left sitting on EOB  [X] Patient left in no apparent distress in bed  [ ] Patient declined to be OOB at this time due to  [X] Call bell left within reach  [X] Nursing notified(SINDY Pitts)  [ ] Caregiver present  [ ] Bed alarm activated  COMMUNICATION/EDUCATION:   [X]         Fall prevention education was provided and the patient/caregiver indicated understanding. [X]         Patient/family have participated as able in goal setting and plan of care. [X]         Patient/family agree to work toward stated goals and plan of care. [ ]         Patient understands intent and goals of therapy, but is neutral about his/her participation. [ ]         Patient is unable to participate in goal setting and plan of care. Thank you for this referral.  Alanna Bear   Time Calculation: 30 mins     G-codes: Mobility [id number] Current  CJ= 20-39%   Goal  CI= 1-19%. The severity rating is based on the Level of Assistance required for Functional Mobility and ADLs.      Eval Complexity: History: MEDIUM  Complexity : 1-2 comorbidities / personal factors will impact the outcome/ POC Exam:MEDIUM Complexity : 3 Standardized tests and measures addressing body structure, function, activity limitation and / or participation in recreation  Presentation: MEDIUM Complexity : Evolving with changing characteristics  Overall Complexity:MEDIUM

## 2017-02-14 NOTE — PROGRESS NOTES
Pt received from PACU. Vitals within normal limits. Pt accompanied by son. Pt has no complaints of chest pain or SOB. Pedal pulses present bilaterally. Pt is stable and will continue to monitor.

## 2017-02-14 NOTE — OP NOTES
[address]    Name:  Tony Doe  MR#:  [id number]  :  1950  Account #:  [de-identified]  Date of Adm:  2017  Date of Surgery:  2017      PREOPERATIVE DIAGNOSIS: End-stage arthritis of the right knee  with 23 degree valgus deformity, preoperative recurvatum and  significant instability and end-stage arthritis, right knee. POSTOPERATIVE DIAGNOSIS: End-stage arthritis of the right knee  with 23 degree valgus deformity, preoperative recurvatum and  significant instability and end-stage arthritis, right knee. PROCEDURES PERFORMED: Right total knee replacement using the  Ksenia 2 system with a size 6 right posterior stabilized Legion  Oxinium femoral component/SPC size 3 tibia, 32 asymmetric X3  patella, and a size 3 49 Legion constrained articular insert. COMPLICATIONS: No complications. SPECIMENS REMOVED: No specimens. ESTIMATED BLOOD LOSS: 50.    SURGEON: Malorie Gloria MD    FIRST ASSISTANT: Jose Juan Harding    SECOND ASSISTANT: Narda Castillo    ANESTHESIA: Shlomo Selvin, preoperative femoral nerve block with  light general.    DESCRIPTION OF OPERATIVE PROCEDURE: After the anesthetic  was successfully induced, it was confirmed the patient did receive  preoperative antibiotics, the knee was examined and time-out  performed. Midline incision, knee debrided in the usual fashion. Femoral canal aspirated, lavaged, and re-aspirated prior to  instrumentation, cut for 5 degrees to the appropriate side. Crab claw  was utilized to prevent undercutting. All cuts checked for trueness and  squareness and all soft tissue structures protected during the sawing  process. Appropriate attention was paid to correct femoral rotation and  due to the valgus nature of the knee, the lateral femoral condyle was  very hypoplastic and we had to manually set femoral rotation and we  would gap balance the knee.  We made preliminary femoral cuts, and  switched our attention to the tibia, used the external alignment guide  with appropriate landmarks and resected enough to get a decent  cleanup cut while protecting neurovascular structures, removed  posterior osteophytes and used the Aquamantys and Exparel cocktail. We then gap balanced the knee between medial, lateral, flexion,  extension, and we checked for lateral structures tightness, including  popliteus, LCL, IT band and posterior capsule, and were appropriately  tensioned; thus, reconfirming correct femoral rotation. We then did our  femoral finishing, followed by placement of the trial components to set  our tibial rotation, which was marked and later re-punched. We then resurfaced the patella, restoring patellar thickness  anatomically and using a rongeur to smooth out the edges. With all the trial components in place, we checked the overall  alignment, range of motion, soft tissue balance, patellar tracking,  stability and alignment, all of which we were delighted with. Fashioned  a bone plug for the femoral canal, further controlled hemostasis,  cemented in the knee, removing all extraneous cement and holding the  knee in full extension until the cement was fully cured. Further cement  removal. Pulse lavage trialing and I was happiest with the 9, locked it  in place, again reducing the knee. Placed a deep drain, let the  tourniquet down, routine closure, and fully flexed the knee prior to  closure of the skin. At the end of the case, instrument, sponge and  needle count was correct. There were no complications. The patient  tolerated the procedure well, and blood loss less than 50. An excellent  outcome of the case and the knee was extremely stable afterwards.         Olive Hunter MD AM / Chantell Steiner  D:  02/14/2017   09:51  T:  02/14/2017   10:31  Job #:  [id number]

## 2017-02-14 NOTE — ANESTHESIA POSTPROCEDURE EVALUATION
Post-Anesthesia Evaluation and Assessment    Patient: Rachel Grady MRN: [id number]  SSN: [id number]    YOB: 1950  Age: 79 y.o. Sex: female       Cardiovascular Function/Vital Signs  Visit Vitals    /76 (BP 1 Location: Right arm, BP Patient Position: At rest)    Pulse 64    Temp 35.8 °C (96.4 °F)    Resp 18    Ht 5' 4\" (1.626 m)    Wt 80.4 kg (177 lb 3.2 oz)    SpO2 99%    BMI 30.42 kg/m2       Patient is status post general, regional anesthesia for Procedure(s):  RIGHT TOTAL KNEE ARTHROPLASTY. Nausea/Vomiting: None    Postoperative hydration reviewed and adequate. Pain:  Pain Scale 1: Numeric (0 - 10) (02/14/17 1048)  Pain Intensity 1: 0 (02/14/17 1048)   Managed    Neurological Status:   Neuro (WDL): Within Defined Limits (02/14/17 0618)  Neuro  Neurologic State: Alert;Drowsy (02/14/17 1136)  Orientation Level: Oriented X4 (02/14/17 1136)   At baseline    Mental Status and Level of Consciousness: Arousable    Pulmonary Status:   O2 Device: Nasal cannula (02/14/17 1012)   Adequate oxygenation and airway patent    Complications related to anesthesia: None    Post-anesthesia assessment completed.  No concerns      Signed By: Paulette Phelps MD     February 14, 2017

## 2017-02-14 NOTE — H&P (VIEW-ONLY)
[address], [address] [facility]  [phone]           HISTORY & PHYSICAL      Patient: Lew Gu                MRN: [id number]       SSN: [id number]  YOB: 1950        AGE: 79 y.o. SEX: female  Body mass index is 31.41 kg/(m^2). PCP: Parrish Pisano MD  02/09/17      CC: right knee end stage OA  Problem List Items Addressed This Visit     None      Visit Diagnoses     Right knee pain, unspecified chronicity    -  Primary    Relevant Orders    AMB POC XRAY, KNEE; COMPLETE, 4+ VIEW (Completed)            HPI:  The patient is a pleasant 79 y.o. whom has end stage OA of their Right knee and has failed conservative treatment including but not limited to NSAIDS, cortisone injections, viscosupplementation, PT, and pain medicine. Due to the current findings and affected activities of daily living, surgical intervention is indicated. The alternatives, risks, complications, as well as expected outcome were discussed. These include but are not limited to infection, blood loss, need for blood transfusion, neurovascular damage, DVT, PE,  post-op stiffness and pain, leg length discrepancy, dislocation, anesthetic complications, prothesis longevity, need for more surgery, MI, stroke, and even death. The patient understands and wishes to proceed with surgery.       Past Medical History   Diagnosis Date    Advance directive discussed with patient 8/17/2016    Aspirin intolerance 1/26/2017    Asthma      bronchitis    Cancer ([address] 1998     Left breast cancer    Cervical radiculopathy     Cigarette nicotine dependence in remission 8/17/2016    DDD (degenerative disc disease), lumbar     Degeneration of cervical intervertebral disc     Degeneration of thoracic intervertebral disc     Diabetes mellitus ([address]      type 2    Diabetes mellitus type 2, controlled ([address] 8/2/2016    Essential hypertension with goal blood pressure less than 140/90 8/10/2016    GERD (gastroesophageal reflux disease)     GI bleed 6/29/16    Gout     H. pylori infection 7/11/2016    H/O: GI bleed 1/26/2017    Hammer toe of left foot     Hypercholesterolemia     Hypertension     Lumbago with sciatica     Lumbar radiculopathy     Malignant neoplasm of left female breast ([facility] 8/2/2016    Mixed incontinence 8/10/2016    Obesity, Class I, BMI 30-34.9 8/2/2016    osteoarthritis     Osteoarthrosis, generalized, involving multiple sites     Osteoporosis     Paronychia of finger     Primary insomnia 8/10/2016    Sciatica of left side     Scoliosis     Segmental and somatic dysfunction of lumbar region     Segmental and somatic dysfunction of pelvic region     Urinary tract infection     Vertigo     Vitamin B12 deficiency     Vitamin D deficiency     Wound infection          Current Outpatient Prescriptions:     metFORMIN (GLUCOPHAGE) 500 mg tablet, Take 1.5 Tabs by mouth two (2) times daily (with meals). (Patient taking differently: Take 500 mg by mouth two (2) times daily (with meals). ), Disp: 180 Tab, Rfl: 3    traMADol (ULTRAM) 50 mg tablet, Take 1 Tab by mouth every six (6) hours as needed for Pain. Max Daily Amount: 200 mg., Disp: 120 Tab, Rfl: 0    CRANBERRY FRUIT EXTRACT (CRANBERRY CONCENTRATE PO), Take 2 Tabs by mouth daily. , Disp: , Rfl:     amLODIPine (NORVASC) 10 mg tablet, Take 1 Tab by mouth daily. , Disp: 90 Tab, Rfl: 3    valsartan (DIOVAN) 160 mg tablet, Take 1 Tab by mouth every evening., Disp: 90 Tab, Rfl: 3    metoprolol succinate (TOPROL-XL) 50 mg XL tablet, Take 1 Tab by mouth daily. , Disp: 90 Tab, Rfl: 3    febuxostat (ULORIC) 40 mg tab tablet, Take 1 Tab by mouth daily. , Disp: 90 Tab, Rfl: 3    fexofenadine (ALLEGRA) 180 mg tablet, Take 1 Tab by mouth daily. (Patient taking differently: Take 180 mg by mouth daily as needed.), Disp: 90 Tab, Rfl: 3    colchicine 0.6 mg tablet, Take 1 Tab by mouth daily. , Disp: 90 Tab, Rfl: 0    oxybutynin chloride XL (DITROPAN XL) 10 mg CR tablet, Take 1 Tab by mouth daily. , Disp: 90 Tab, Rfl: 3    melatonin 3 mg tablet, Take 1 Tab by mouth nightly., Disp: 90 Tab, Rfl: 3    pravastatin (PRAVACHOL) 20 mg tablet, Take 1 Tab by mouth nightly., Disp: 90 Tab, Rfl: 3    pantoprazole (PROTONIX) 40 mg tablet, Take 40 mg by mouth daily as needed. , Disp: , Rfl:     senna (SENOKOT) 8.6 mg tablet, Take 2 Tabs by mouth nightly., Disp: , Rfl:     Allergies   Allergen Reactions    Nsaids (Non-Steroidal Anti-Inflammatory Drug) Other (comments)     GI Bleed    Aspirin Other (comments)     Gi bleed         Social History     Social History    Marital status:      Spouse name: N/A    Number of children: N/A    Years of education: N/A     Occupational History    Not on file. Social History Main Topics    Smoking status: Former Smoker     Packs/day: 0.25     Years: 5.00     Quit date: 1/1/1976    Smokeless tobacco: Never Used      Comment: 4 cigarettes per day    Alcohol use No    Drug use: No    Sexual activity: Not Currently     Partners: Male     Birth control/ protection: None     Other Topics Concern    Not on file     Social History Narrative       Past Surgical History   Procedure Laterality Date    Hx tubal ligation Bilateral     Hx mastectomy Left 1998       PE: Visit Vitals    /86    Pulse 70    Temp 97.2 °F (36.2 °C)    Ht 5' 4\" (1.626 m)    Wt 183 lb (83 kg)    BMI 31.41 kg/m2       A&O X3, NAD, well develop, well nourished  Heart: S1-S2, rrr  Lungs: CTA bilat  Abd: soft, nt, nt, + bs in all quadrants  Ext:  Pos distal pulses to DP, PT      X-ray: right knee shows end stage OA    Labs: labs were reviewed and wnl.  ua pos, treated with levaquin    A:  Right  knee end stage OA    P:  At this point we will move forward with surgery.   Again, the alternatives, risks, complications, as well as expected outcome were discussed and the patient wishes to proceed with surgery. Pt has been instructed to stop aspirin, nsaids, rheumatologic medications and blood thinners. They have also been instructed to continue on any heart and bp meds and to take them the morning of surgery with sips of water.          Andrzej Ceballos

## 2017-02-14 NOTE — CONSULTS
[facility]. Pulmonary, Critical Care, and Sleep Medicine    Initial Patient Consult    Name: Tavon Alejandra MRN: [id number]   : 1950 Hospital: [address] [place] [address] Dr   Date: 2017        IMPRESSION:   · End Stage OA R knee, failing conservative therapy - S/P TKR - R knee  · Asthma - Takes Allegra and Combi vent inhaler at home. · Prior Smoker (quit about 41 yrs ago)  · DM II - Not well controlled. On Metformin. HgBA1C (16) - 8.4  · HTN - Goal BP <140/90; BP is at goal w/ Norvasc, Toprol and Valsartan   · Hx of GI Bleed  · Hypercholesterolemia  · OA  · Osteoporosis  · Obesity  · Vitamin B12 Deficiency  · Vitamin D Deficiency  · Vertigo  · Hx of UTI - Recently completed course of ABX prior to surgery. · Hx of Gout - Not well controlled. On Colchicine & Uloric      RECOMMENDATIONS:   · Resp: SP02 >90%. Titrate supp O2 PRN. Currently resting comfortably on NC @ 2LPM. Duo-nebs q6 scheduled while awake - will re-evaluate tomorrow. Aspiration precautions, HOB >30 degrees. Aggressive pulmonary toileting. Encourage incentive spirometry, PT/OT eval & treat, mobilization. · ID: Post-Op ABX: Cefazolin 2g q8 x3 doses - per Ortho. Afebrile. Monitor for signs of infection. · CVS: No labs for today. Daily CBC. Continue home meds - Norvasc, Metoprolol, Prevastatin, Diovan, HCTZ. · Heme/Onc: Monitor hemodynamics; monitor for trends. · Metabolic: No labs today. Monitor lytes; replace PRN. BMP tomorrow. · Renal: Monitor renal function. · Endocrine: Pt has DM II. Glycemic Control - SSI. B.S Goal <140. POC Glucose today - 138. POC glucose checks AC&HS. Is on home med - Metformin - will consider restarting tomorrow, but for now will hold d/t post-op. Will likely restart Uloric and Colchicine for Gout tomorrow. · GI: Pt will be on diabetic diet; PPI - Protonix 40mg daily. · Neuro/Pain: Management per Ortho.  Tylenol 650mg q4 PRN; OxyCodone ER - 20mg q12; Percocet - 7.5/325mg q4 PRN.   · DVT, PUD prophylaxis - Management per Ortho. Subjective: This patient has been seen and evaluated at the request of Dr. Rajat Matta for Medical management post-op, s/p R TKR. Patient is a 79 y.o. female who has end stage OA of her Right knee and has failed conservative treatment. On 02/14/17, pt underwent Right TKR; perioperative course was unremarkable and uncomplicated and pt was transferred to . Currently, pt is lying in bed; resting comfortably on NC - 2LPM; ice machine applied to R leg. Pt denies any pain or complaint. Denies fever/chills, H/A, N/V/D, KIM/SOB, CP, Palpitations, abd pain, constipation. Review of records shows pt recently completed course of Levaquin, pt states this was for UTI prior to surgery date. Currently denies any urinary symptoms.      Past Medical History   Diagnosis Date    Advance directive discussed with patient 8/17/2016    Aspirin intolerance 1/26/2017    Asthma      bronchitis    Cancer ([address] 1998     Left breast cancer    Cervical radiculopathy     Cigarette nicotine dependence in remission 8/17/2016    DDD (degenerative disc disease), lumbar     Degeneration of cervical intervertebral disc     Degeneration of thoracic intervertebral disc     Diabetes mellitus ([address]      type 2    Diabetes mellitus type 2, controlled ([address] 8/2/2016    Essential hypertension with goal blood pressure less than 140/90 8/10/2016    GERD (gastroesophageal reflux disease)     GI bleed 6/29/16    Gout     H. pylori infection 7/11/2016    H/O: GI bleed 1/26/2017    Hammer toe of left foot     Hypercholesterolemia     Hypertension     Lumbago with sciatica     Lumbar radiculopathy     Malignant neoplasm of left female breast ([address] 8/2/2016    Mixed incontinence 8/10/2016    Obesity, Class I, BMI 30-34.9 8/2/2016    osteoarthritis     Osteoarthrosis, generalized, involving multiple sites     Osteoporosis     Paronychia of finger     Primary insomnia 8/10/2016  Pure hypercholesterolemia 2/9/2017    Sciatica of left side     Scoliosis     Segmental and somatic dysfunction of lumbar region     Segmental and somatic dysfunction of pelvic region     Urinary tract infection     Vertigo     Vitamin B12 deficiency     Vitamin D deficiency     Wound infection       Past Surgical History   Procedure Laterality Date    Hx tubal ligation Bilateral     Hx mastectomy Left 1998      Prior to Admission medications    Medication Sig Start Date End Date Taking? Authorizing Provider   levoFLOXacin (LEVAQUIN) 500 mg tablet 1 po q day x 5 days 2/9/17  Yes Nikita Conti PA-C   LORazepam (ATIVAN) 1 mg tablet Take 1 Tab by mouth every eight (8) hours as needed. Max Daily Amount: 3 mg. Indications: ANXIETY 2/9/17  Yes Nikita Conti PA-C   valsartan-hydroCHLOROthiazide (DIOVAN-HCT) 160-12.5 mg per tablet Take 1 Tab by mouth daily. 2/9/17  Yes Jose M Currie MD   metFORMIN (GLUCOPHAGE) 500 mg tablet Take 1.5 Tabs by mouth two (2) times daily (with meals). Patient taking differently: Take 500 mg by mouth two (2) times daily (with meals). 1/25/17  Yes Jose M Currie MD   traMADol Vi Bathe) 50 mg tablet Take 1 Tab by mouth every six (6) hours as needed for Pain. Max Daily Amount: 200 mg. 10/18/16  Yes Monster Enriquez DO   amLODIPine (NORVASC) 10 mg tablet Take 1 Tab by mouth daily. 9/19/16  Yes Jose M Currie MD   metoprolol succinate (TOPROL-XL) 50 mg XL tablet Take 1 Tab by mouth daily. 9/19/16  Yes Jose M Currie MD   oxybutynin chloride XL (DITROPAN XL) 10 mg CR tablet Take 1 Tab by mouth daily. 8/17/16  Yes Jose M Currie MD   melatonin 3 mg tablet Take 1 Tab by mouth nightly. 8/10/16  Yes Jose M Currie MD   pravastatin (PRAVACHOL) 20 mg tablet Take 1 Tab by mouth nightly. 8/10/16  Yes Jose M Currie MD   CRANBERRY FRUIT EXTRACT (CRANBERRY CONCENTRATE PO) Take 2 Tabs by mouth daily. Historical Provider   febuxostat (ULORIC) 40 mg tab tablet Take 1 Tab by mouth daily. 9/19/16   Joe Galaviz MD   fexofenadine (ALLEGRA) 180 mg tablet Take 1 Tab by mouth daily. Patient taking differently: Take 180 mg by mouth daily as needed. 8/17/16   Joe Galaviz MD   colchicine 0.6 mg tablet Take 1 Tab by mouth daily. 8/17/16   Joe Galaviz MD   pantoprazole (PROTONIX) 40 mg tablet Take 40 mg by mouth daily as needed. Historical Provider   senna (SENOKOT) 8.6 mg tablet Take 2 Tabs by mouth nightly.     Historical Provider     Allergies   Allergen Reactions    Nsaids (Non-Steroidal Anti-Inflammatory Drug) Other (comments)     GI Bleed    Aspirin Other (comments)     Gi bleed        Social History   Substance Use Topics    Smoking status: Former Smoker     Packs/day: 0.25     Years: 5.00     Quit date: 1/1/1976    Smokeless tobacco: Never Used      Comment: 4 cigarettes per day    Alcohol use No      Family History   Problem Relation Age of Onset    Hypertension Mother     Hypertension Father         Current Facility-Administered Medications   Medication Dose Route Frequency    pregabalin (LYRICA) capsule 75 mg  75 mg Oral NOW    acetaminophen (TYLENOL) tablet 1,000 mg  1,000 mg Oral NOW    amLODIPine (NORVASC) tablet 10 mg  10 mg Oral DAILY    metoprolol succinate (TOPROL-XL) XL tablet 50 mg  50 mg Oral DAILY    oxybutynin chloride XL (DITROPAN XL) tablet 10 mg  10 mg Oral DAILY    [START ON 2/15/2017] pantoprazole (PROTONIX) tablet 40 mg  40 mg Oral ACB    pravastatin (PRAVACHOL) tablet 20 mg  20 mg Oral QHS    0.9% sodium chloride infusion  100 mL/hr IntraVENous CONTINUOUS    sodium chloride (NS) flush 5-10 mL  5-10 mL IntraVENous Q8H    ceFAZolin (ANCEF) 2g IVPB in 50 mL D5W  2 g IntraVENous Q8H    ferrous sulfate tablet 325 mg  1 Tab Oral BID WITH MEALS    pregabalin (LYRICA) capsule 50 mg  50 mg Oral BID    oxyCODONE ER (OxyCONTIN) tablet 20 mg  20 mg Oral Q12H    [START ON 2/15/2017] valsartan (DIOVAN) tablet 160 mg  160 mg Oral DAILY    And    [START ON 2/15/2017] hydroCHLOROthiazide (HYDRODIURIL) tablet 12.5 mg  12.5 mg Oral DAILY    insulin lispro (HUMALOG) injection   SubCUTAneous AC&HS       Review of Systems:  Pertinent items are noted in HPI. Objective:   Vital Signs:    Visit Vitals    /76 (BP 1 Location: Right arm, BP Patient Position: At rest)    Pulse 64    Temp 96.4 °F (35.8 °C)    Resp 18    Ht 5' 4\" (1.626 m)    Wt 80.4 kg (177 lb 3.2 oz)    SpO2 99%    BMI 30.42 kg/m2       O2 Device: Nasal cannula   O2 Flow Rate (L/min): 2 l/min   Temp (24hrs), Av.1 °F (36.2 °C), Min:96.4 °F (35.8 °C), Max:98.3 °F (36.8 °C)       Intake/Output:   Last shift:       0701 -  1900  In: 1500 [I.V.:1500]  Out: 180 [Urine:100; Drains:30]  Last 3 shifts:      Intake/Output Summary (Last 24 hours) at 17 1450  Last data filed at 17 1214   Gross per 24 hour   Intake             1500 ml   Output              180 ml   Net             1320 ml      Physical Exam:   General:  Alert, cooperative, NAD, appears stated age. Head:  Normocephalic, without obvious abnormality, atraumatic. Eyes:  Conjunctivae/corneas clear. PERRL, ANicteric   Nose: Nares normal. Mucosa normal. No drainage or sinus tenderness. Throat: Lips, mucosa dry. NO thrush. Neck: Supple, symmetrical, trachea midline, no adenopathy, thyroid: no enlargment/tenderness/nodules, no carotid bruit and no JVD. No crepitus   Back:   Symmetric, no curvature, no spine tenderness or flank pain   Lungs:   Symmetrical chest rise; CTAB; no wheezes/rhonchi/rales noted. Chest wall:  No tenderness or deformity. NO CREPITUS   Heart:  RRR S1, S2 normal, no m/r/g   Abdomen:   Soft, non-tender. Bowel sounds normal. No masses,  No organomegaly. No paradox   Extremities: Normal, atraumatic, no cyanosis or edema. Dressings examined. right leg Hemovac   Pulses: 1-2+ and symmetric all extremities.    Skin: Skin color, texture, turgor normal. No rashes or lesions   Lymph nodes: Cervical, supraclavicular, and axillary nodes normal.   Neurologic: Grossly nonfocal             Devices: CPM Hemovac in place      Prophylaxis- DVT: Appropriate (SCD's) Surgical site: Appropiate  Anesthesia: Light General   Nerve blocks: Preoperative Femoral block - R leg. Data review:   Labs:  No results for input(s): WBC, HGB, HCT, PLT, HGBEXT, HCTEXT, PLTEXT, HGBEXT, HCTEXT, PLTEXT in the last 72 hours. No results for input(s): NA, K, CL, CO2, GLU, BUN, CREA, CA, MG, PHOS, ALB, TBIL, SGOT, ALT, INR in the last 72 hours. No lab exists for component: INREXT, INREXT  No results for input(s): PH, PCO2, PO2, HCO3, FIO2 in the last 72 hours. Glycemic control: SSI; will hold home Metformin, likely will restart tomorrow. Anesthesia records reviewed. Imaging:  XRAY RT KNEE [02/14/17]: FINDINGS:   A new right total knee arthroplasty is present. No fracture or dislocation  appreciated. Changes are noted in the soft tissues consistent with recent  surgery. IMPRESSION:  1. New right total knee arthroplasty without complication. I have personally reviewed the patients radiographs and have reviewed the reports:  AMD     Moderate  complexity decision making was performed in this consultation and evaluation of this patient who is at high risk for decompensation hemodynamically. Zenia Schirmer, PA-C       I have seen and examined patient independently. I concur with assessment and plan as outline by KAYLYNN Ross. Maurizio Freed MD, [facility]   Pulmonary and Critical Care Medicine

## 2017-02-14 NOTE — ANESTHESIA PREPROCEDURE EVALUATION
Anesthetic History   No history of anesthetic complications            Review of Systems / Medical History  Patient summary reviewed and pertinent labs reviewed    Pulmonary          Undiagnosed apnea  Asthma : well controlled    Comments: Current Smoker? NO       Elective Surgery? Yes       Abstained from smoking 24 hours prior to anesthesia? N/A    Risk Factors for Postoperative nausea/vomiting:       History of postoperative nausea/vomiting? NO       Female? YES       Motion sickness? NO       Intended opioid administration for postoperative analgesia?   YES   Neuro/Psych   Within defined limits           Cardiovascular    Hypertension              Exercise tolerance: >4 METS     GI/Hepatic/Renal     GERD: well controlled          Comments: H/o GI bleed after Motrin  Endo/Other    Diabetes: poorly controlled, type 2    Obesity and arthritis     Other Findings            Physical Exam    Airway  Mallampati: II  TM Distance: 4 - 6 cm  Neck ROM: normal range of motion   Mouth opening: Normal     Cardiovascular    Rhythm: regular  Rate: normal         Dental    Dentition: Upper partial plate and Lower partial plate     Pulmonary  Breath sounds clear to auscultation               Abdominal  GI exam deferred       Other Findings            Anesthetic Plan    ASA: 3  Anesthesia type: general and regional - femoral single shot      Post-op pain plan if not by surgeon: peripheral nerve block single    Induction: Intravenous  Anesthetic plan and risks discussed with: Patient

## 2017-02-14 NOTE — INTERVAL H&P NOTE
H&P Update:  Celestino Mcburney was seen and examined. History and physical has been reviewed. The patient has been examined.  There have been no significant clinical changes since the completion of the originally dated History and Physical.    Signed By: Brianna Hernandez MD     February 14, 2017 7:08 AM

## 2017-02-14 NOTE — IP AVS SNAPSHOT
Current Discharge Medication List  
  
Take these medications at their scheduled times Dose & Instructions Dispensing Information Comments Morning Noon Evening Bedtime  
 amLODIPine 10 mg tablet Commonly known as:  Lorenzo Leigh Your next dose is: Today, Tomorrow Other:  ____________ Dose:  10 mg Take 1 Tab by mouth daily. Quantity:  90 Tab Refills:  3  
     
   
   
   
  
 colchicine 0.6 mg tablet Your next dose is: Today, Tomorrow Other:  ____________ Dose:  0.6 mg Take 1 Tab by mouth daily. Quantity:  90 Tab Refills:  0 CRANBERRY CONCENTRATE PO Your next dose is: Today, Tomorrow Other:  ____________ Dose:  2 Tab Take 2 Tabs by mouth daily. Refills:  0  
     
   
   
   
  
 febuxostat 40 mg Tab tablet Commonly known as:  Kp Brown Your next dose is: Today, Tomorrow Other:  ____________ Dose:  40 mg Take 1 Tab by mouth daily. Quantity:  90 Tab Refills:  3  
     
   
   
   
  
 ferrous sulfate 325 mg (65 mg iron) tablet Your next dose is: Today, Tomorrow Other:  ____________ Dose:  325 mg Take 1 Tab by mouth two (2) times daily (with meals). Quantity:  60 Tab Refills:  2  
     
   
   
   
  
 fexofenadine 180 mg tablet Commonly known as:  Scott Garcia Your next dose is: Today, Tomorrow Other:  ____________ Dose:  180 mg Take 1 Tab by mouth daily. Quantity:  90 Tab Refills:  3  
     
   
   
   
  
 melatonin 3 mg tablet Your next dose is: Today, Tomorrow Other:  ____________ Dose:  3 mg Take 1 Tab by mouth nightly. Quantity:  90 Tab Refills:  3  
     
   
   
   
  
 metFORMIN 500 mg tablet Commonly known as:  GLUCOPHAGE Your next dose is: Today, Tomorrow Other:  ____________  Dose:  750 mg  
 Take 1.5 Tabs by mouth two (2) times daily (with meals). Quantity:  180 Tab Refills:  3  
     
   
   
   
  
 metoprolol succinate 50 mg XL tablet Commonly known as:  TOPROL-XL Your next dose is: Today, Tomorrow Other:  ____________ Dose:  50 mg Take 1 Tab by mouth daily. Quantity:  90 Tab Refills:  3  
     
   
   
   
  
 oxybutynin chloride XL 10 mg CR tablet Commonly known as:  DITROPAN XL Your next dose is: Today, Tomorrow Other:  ____________ Dose:  10 mg Take 1 Tab by mouth daily. Quantity:  90 Tab Refills:  3  
     
   
   
   
  
 pravastatin 20 mg tablet Commonly known as:  PRAVACHOL Your next dose is: Today, Tomorrow Other:  ____________ Dose:  20 mg Take 1 Tab by mouth nightly. Quantity:  90 Tab Refills:  3  
     
   
   
   
  
 senna 8.6 mg tablet Commonly known as:  Candelario Hebert Your next dose is: Today, Tomorrow Other:  ____________ Dose:  2 Tab Take 2 Tabs by mouth nightly. Refills:  0  
     
   
   
   
  
 valsartan-hydroCHLOROthiazide 160-12.5 mg per tablet Commonly known as:  DIOVAN-HCT Your next dose is: Today, Tomorrow Other:  ____________ Dose:  1 Tab Take 1 Tab by mouth daily. Quantity:  90 Tab Refills:  3  
     
   
   
   
  
 warfarin 3 mg tablet Commonly known as:  COUMADIN Your next dose is: Today, Tomorrow Other:  ____________ Dose:  3 mg Take 1 Tab by mouth daily for 30 days. Quantity:  30 Tab Refills:  0 Take these medications as needed Dose & Instructions Dispensing Information Comments Morning Noon Evening Bedtime LORazepam 1 mg tablet Commonly known as:  ATIVAN Your next dose is: Today, Tomorrow Other:  ____________ Dose:  1 mg Take 1 Tab by mouth every eight (8) hours as needed.  Max Daily Amount: 3 mg. Indications: ANXIETY Quantity:  30 Tab Refills:  0  
     
   
   
   
  
 oxyCODONE-acetaminophen 7.5-325 mg per tablet Commonly known as:  PERCOCET 7.5 Your next dose is: Today, Tomorrow Other:  ____________ Dose:  1-2 Tab Take 1-2 Tabs by mouth every four (4) hours as needed. Max Daily Amount: 12 Tabs. Quantity:  60 Tab Refills:  0  
     
   
   
   
  
 pantoprazole 40 mg tablet Commonly known as:  PROTONIX Your next dose is: Today, Tomorrow Other:  ____________ Dose:  40 mg Take 40 mg by mouth daily as needed. Refills:  0 Take these medications as directed Dose & Instructions Dispensing Information Comments Morning Noon Evening Bedtime  
 levoFLOXacin 500 mg tablet Commonly known as:  Bo Back Your next dose is: Today, Tomorrow Other:  ____________ 1 po q day x 5 days Quantity:  5 Tab Refills:  0 Where to Get Your Medications Information about where to get these medications is not yet available ! Ask your nurse or doctor about these medications  
  ferrous sulfate 325 mg (65 mg iron) tablet  
 oxyCODONE-acetaminophen 7.5-325 mg per tablet  
 warfarin 3 mg tablet

## 2017-02-14 NOTE — PROGRESS NOTES
Bedside and Verbal shift change report given to Jessica Runner, RN (oncoming nurse) by Annel Sevilla (offgoing nurse). Report included the following information SBAR, Kardex, MAR and Recent Results.     SITUATION:    Code Status: No Order   Reason for Admission: Primary osteoarthritis of right knee [address] day: 0   Problem List:       Hospital Problems  Date Reviewed: 2/14/2017          Codes Class Noted POA    Arthritis of knee ICD-10-CM: M19.90  ICD-9-CM: 716.96  2/14/2017 Unknown              BACKGROUND:    Past Medical History:   Past Medical History   Diagnosis Date    Advance directive discussed with patient 8/17/2016    Aspirin intolerance 1/26/2017    Asthma      bronchitis    Cancer ([facility] [address] 1998     Left breast cancer    Cervical radiculopathy     Cigarette nicotine dependence in remission 8/17/2016    DDD (degenerative disc disease), lumbar     Degeneration of cervical intervertebral disc     Degeneration of thoracic intervertebral disc     Diabetes mellitus (HCC)      type 2    Diabetes mellitus type 2, controlled ([facility] [address] 8/2/2016    Essential hypertension with goal blood pressure less than 140/90 8/10/2016    GERD (gastroesophageal reflux disease)     GI bleed 6/29/16    Gout     H. pylori infection 7/11/2016    H/O: GI bleed 1/26/2017    Hammer toe of left foot     Hypercholesterolemia     Hypertension     Lumbago with sciatica     Lumbar radiculopathy     Malignant neoplasm of left female breast ([facility] [address] 8/2/2016    Mixed incontinence 8/10/2016    Obesity, Class I, BMI 30-34.9 8/2/2016    osteoarthritis     Osteoarthrosis, generalized, involving multiple sites     Osteoporosis     Paronychia of finger     Primary insomnia 8/10/2016    Pure hypercholesterolemia 2/9/2017    Sciatica of left side     Scoliosis     Segmental and somatic dysfunction of lumbar region     Segmental and somatic dysfunction of pelvic region     Urinary tract infection     Vertigo     Vitamin B12 deficiency     Vitamin D deficiency     Wound infection          Patient taking anticoagulants yes     ASSESSMENT:    Changes in Assessment Throughout Shift: no     Patient has Central Line: no Reasons if yes: .  Patient has Garcia Cath: no Reasons if yes: .  Last Vitals:     Vitals:    02/14/17 1021 02/14/17 1027 02/14/17 1109 02/14/17 1502   BP:   141/76 121/73   Pulse: 60  64 63   Resp: 15  18 16   Temp:  97.3 °F (36.3 °C) 96.4 °F (35.8 °C) 96.5 °F (35.8 °C)   SpO2: 100%  99% 99%   Weight:       Height:            IV and DRAINS (will only show if present)   Peripheral IV 02/14/17 Right Wrist-Site Assessment: Clean, dry, & intact  Tyrese-Orourke Drain 02/14/17 Right Knee-Site Assessment: Clean, dry, & intact     WOUND (if present)   Wound Type:  none   Dressing present     Wound Concerns/Notes:  none     PAIN    Pain Assessment    Pain Intensity 1: 0 (02/14/17 1813)    Pain Location 1: Knee         Patient Stated Pain Goal: 8  o Interventions for Pain:  See MAR  o Intervention effective: yes  o Time of last intervention: See MAR   o Reassessment Completed: yes      Last 3 Weights:  Last 3 Recorded Weights in this Encounter    01/27/17 1320 02/14/17 0559   Weight: 83 kg (183 lb) 80.4 kg (177 lb 3.2 oz)     Weight change:      INTAKE/OUPUT    Current Shift: 02/14 0701 - 02/14 1900  In: 1980 [P.O.:480; I.V.:1500]  Out: 970 [Urine:800; Drains:120]    Last three shifts:       LAB RESULTS   No results for input(s): WBC, HGB, HCT, PLT, HGBEXT, HCTEXT, PLTEXT in the last 72 hours. No results for input(s): NA, K, GLU, BUN, CREA, CA, MG, INR in the last 72 hours. No lab exists for component: PT, PTT, INREXT    RECOMMENDATIONS AND DISCHARGE PLANNING     1. Pending tests/procedures/ Plan of Care or Other Needs: none     2. Discharge plan for patient and Needs/Barriers: rehab    3. Estimated Discharge Date: 2-17-17 Posted on Whiteboard in [place]: yes      4.  The patient's care plan was reviewed with the oncoming nurse. \"HEALS\" SAFETY CHECK      Fall Risk    Total Score: 1    Safety Measures: Safety Measures: Bed/Chair-Wheels locked, Bed in low position, Call light within reach    A safety check occurred in the patient's room between off going nurse and oncoming nurse listed above. The safety check included the below items  Area Items   H  High Alert Medications - Verify all high alert medication drips (heparin, PCA, etc.)   E  Equipment - Suction is set up for ALL patients (with kaela)  - Red plugs utilized for all equipment (IV pumps, etc.)  - WOWs wiped down at end of shift.  - Room stocked with oxygen, suction, and other unit-specific supplies   A  Alarms - Bed alarm is set for fall risk patients  - Ensure chair alarm is in place and activated if patient is up in a chair   L  Lines - Check IV for any infiltration  - Garcia bag is empty if patient has a Garcia   - Tubing and IV bags are labeled   S  Safety   - Room is clean, patient is clean, and equipment is clean. - Hallways are clear from equipment besides carts. - Fall bracelet on for fall risk patients  - Ensure room is clear and free of clutter  - Suction is set up for ALL patients (with kaela)  - Hallways are clear from equipment besides carts.    - Isolation precautions followed, supplies available outside room, sign posted     Amos Lesly

## 2017-02-14 NOTE — H&P
Patient: Mic Cooper MRN: [id number]  SSN: [id number]    YOB: 1950  Age: 79 y.o. Sex: female      History and Physical    Mic Cooper is a 79 y.o. female having Procedure(s):  RIGHT TOTAL KNEE ARTHROPLASTY/SMITH & NEPHEW PRIMARY WITH HINGE BACK UP/KALE PATELLA/CARTER TO ASSIST/FEMORAL NERVE BLOCK. Allergies:    Allergies   Allergen Reactions    Nsaids (Non-Steroidal Anti-Inflammatory Drug) Other (comments)     GI Bleed    Aspirin Other (comments)     Gi bleed          Chief Complaint: ***     History of Present Illness: ***    Past Medical History   Diagnosis Date    Advance directive discussed with patient 8/17/2016    Aspirin intolerance 1/26/2017    Asthma      bronchitis    Cancer ([facility] [address] 1998     Left breast cancer    Cervical radiculopathy     Cigarette nicotine dependence in remission 8/17/2016    DDD (degenerative disc disease), lumbar     Degeneration of cervical intervertebral disc     Degeneration of thoracic intervertebral disc     Diabetes mellitus ([facility] [address]      type 2    Diabetes mellitus type 2, controlled ([address] 8/2/2016    Essential hypertension with goal blood pressure less than 140/90 8/10/2016    GERD (gastroesophageal reflux disease)     GI bleed 6/29/16    Gout     H. pylori infection 7/11/2016    H/O: GI bleed 1/26/2017    Hammer toe of left foot     Hypercholesterolemia     Hypertension     Lumbago with sciatica     Lumbar radiculopathy     Malignant neoplasm of left female breast ([facility] [address] 8/2/2016    Mixed incontinence 8/10/2016    Obesity, Class I, BMI 30-34.9 8/2/2016    osteoarthritis     Osteoarthrosis, generalized, involving multiple sites     Osteoporosis     Paronychia of finger     Primary insomnia 8/10/2016    Pure hypercholesterolemia 2/9/2017    Sciatica of left side     Scoliosis     Segmental and somatic dysfunction of lumbar region     Segmental and somatic dysfunction of pelvic region     Urinary tract infection     Vertigo  Vitamin B12 deficiency     Vitamin D deficiency     Wound infection       Past Surgical History   Procedure Laterality Date    Hx tubal ligation Bilateral     Hx mastectomy Left 1998      Family History   Problem Relation Age of Onset    Hypertension Mother     Hypertension Father       Social History   Substance Use Topics    Smoking status: Former Smoker     Packs/day: 0.25     Years: 5.00     Quit date: 1/1/1976    Smokeless tobacco: Never Used      Comment: 4 cigarettes per day    Alcohol use No        Prior to Admission medications    Medication Sig Start Date End Date Taking? Authorizing Provider   levoFLOXacin (LEVAQUIN) 500 mg tablet 1 po q day x 5 days 2/9/17  Yes Karen Anthony PA-C   LORazepam (ATIVAN) 1 mg tablet Take 1 Tab by mouth every eight (8) hours as needed. Max Daily Amount: 3 mg. Indications: ANXIETY 2/9/17  Yes Karen Anthony PA-C   valsartan-hydroCHLOROthiazide (DIOVAN-HCT) 160-12.5 mg per tablet Take 1 Tab by mouth daily. 2/9/17  Yes Kiara Esparza MD   metFORMIN (GLUCOPHAGE) 500 mg tablet Take 1.5 Tabs by mouth two (2) times daily (with meals). Patient taking differently: Take 500 mg by mouth two (2) times daily (with meals). 1/25/17  Yes Kiara Esparza MD   traMADol [place]) 50 mg tablet Take 1 Tab by mouth every six (6) hours as needed for Pain. Max Daily Amount: 200 mg. 10/18/16  Yes Shayla Christian DO   amLODIPine (NORVASC) 10 mg tablet Take 1 Tab by mouth daily. 9/19/16  Yes Kiara Esparza MD   metoprolol succinate (TOPROL-XL) 50 mg XL tablet Take 1 Tab by mouth daily. 9/19/16  Yes Kiara Esparza MD   oxybutynin chloride XL (DITROPAN XL) 10 mg CR tablet Take 1 Tab by mouth daily. 8/17/16  Yes Kiara Esparza MD   melatonin 3 mg tablet Take 1 Tab by mouth nightly. 8/10/16  Yes Kiara Esparza MD   pravastatin (PRAVACHOL) 20 mg tablet Take 1 Tab by mouth nightly.  8/10/16  Yes Kiara Esparza MD   CRANBERRY FRUIT EXTRACT (CRANBERRY CONCENTRATE PO) Take 2 Tabs by mouth daily.    Historical Provider   febuxostat (ULORIC) 40 mg tab tablet Take 1 Tab by mouth daily. 9/19/16   Pema Harper MD   fexofenadine (ALLEGRA) 180 mg tablet Take 1 Tab by mouth daily. Patient taking differently: Take 180 mg by mouth daily as needed. 8/17/16   Pema Harper MD   colchicine 0.6 mg tablet Take 1 Tab by mouth daily. 8/17/16   Pema Harper MD   pantoprazole (PROTONIX) 40 mg tablet Take 40 mg by mouth daily as needed. Historical Provider   senna (SENOKOT) 8.6 mg tablet Take 2 Tabs by mouth nightly. Historical Provider        Visit Vitals    /88 (BP 1 Location: Right arm, BP Patient Position: At rest)    Pulse 68    Temp 36.8 °C (98.3 °F)    Resp 20    Ht 5' 4\" (1.626 m)    Wt 80.4 kg (177 lb 3.2 oz)    SpO2 97%    BMI 30.42 kg/m2        Assessment and Plan:   Mansoor Palacios is a 79 y.o. female having Procedure(s):  RIGHT TOTAL KNEE ARTHROPLASTY/SMITH & NEPHEW PRIMARY WITH HINGE BACK UP/KALE PATELLA/CARTER TO ASSIST/FEMORAL NERVE BLOCK for ***. Preanesthesia Evaluation     Last edited 02/14/17 0701 by Kaity Johnson MD             Anesthetic History   No history of anesthetic complications            Review of Systems / Medical History  Patient summary reviewed and pertinent labs reviewed    Pulmonary          Undiagnosed apnea  Asthma : well controlled    Comments: Current Smoker? NO       Elective Surgery? Yes       Abstained from smoking 24 hours prior to anesthesia? N/A    Risk Factors for Postoperative nausea/vomiting:       History of postoperative nausea/vomiting? NO       Female? YES       Motion sickness? NO       Intended opioid administration for postoperative analgesia?   YES   Neuro/Psych   Within defined limits           Cardiovascular    Hypertension              Exercise tolerance: >4 METS     GI/Hepatic/Renal     GERD: well controlled          Comments: H/o GI bleed after Motrin  Endo/Other    Diabetes: poorly controlled, type 2    Obesity and arthritis     Other Findings            Physical Exam    Airway  Mallampati: II  TM Distance: 4 - 6 cm  Neck ROM: normal range of motion   Mouth opening: Normal     Cardiovascular    Rhythm: regular  Rate: normal         Dental    Dentition: Upper partial plate and Lower partial plate     Pulmonary  Breath sounds clear to auscultation               Abdominal  GI exam deferred       Other Findings            Anesthetic Plan    ASA: 3  Anesthesia type: general and regional - femoral single shot      Post-op pain plan if not by surgeon: peripheral nerve block single    Induction: Intravenous  Anesthetic plan and risks discussed with: Patient               Preanesthesia evaluation performed by Stephanie Mcdermott MD    Revision History       Date/Time User Provider Type Action    > 02/14/17 0701 Stephanie Mcdermott MD Physician Addend     02/14/17 0639 Stephanie Mcdermott MD Physician Sign                  Signed By: Chacho Foy CRNA     February 14, 2017

## 2017-02-14 NOTE — BRIEF OP NOTE
BRIEF OPERATIVE NOTE    Date of Procedure: 2/14/2017   Preoperative Diagnosis: Primary osteoarthritis of right knee [M17.11]  Postoperative Diagnosis: Primary osteoarthritis of right knee [M17.11]    Procedure(s):  RIGHT TOTAL KNEE ARTHROPLASTY  Surgeon(s) and Role:     * Ania Fernando MD - Primary       Physician Assistant: Tully Severin, PA-C    Surgical Staff:  Circ-1: Jf Dinh RN  Physician Assistant: Tully Severin, PA-C  Scrub Tech-1: Cherise Spar  Surg Asst-1: Stephanie Jason  Event Time In   Incision Start 2592   Incision Close      Anesthesia: General   Estimated Blood Loss: 50ml  Specimens: * No specimens in log *   Findings: same   Complications: none  Implants:   Implant Name Type Inv.  Item Serial No.  Lot No. LRB No. Used Action   CEMENT BNE SIMPLEX TOBRA 4 --  - [id number]  CEMENT BNE SIMPLEX TOBRA 4 --   [employer] HOW [id number] Right 1 Implanted   CEMENT BNE SIMPLEX TOBRA 4 --  - [id number]  CEMENT BNE SIMPLEX TOBRA 4 --   [employer] HOW [id number] Right 1 Implanted   PAT ASYM TRITHLON X3 [id number] -- TRIATHLON ASYMMETRIC X3 - [id number]  PAT ASYM TRITHLON X3 [id number] -- TRIATHLON ASYMMETRIC X3  [employer] HOW [id number] Right 1 Implanted   FEM POST OXIN LEGION SZ6 RT -- SPC EDELMIRA II - [id number]  FEM POST OXIN LEGION SZ6 RT -- SPC EDELMIRA II  [employer] [id number] Right 1 Implanted   BASEPLT FEM NP 3 RT -- EDELMIRA II - [id number]  BASEPLT FEM NP 3 RT -- EDELMIRA II  [employer] [id number] Right 1 Implanted   INSERT TIB SURF 3-4 19MM -- CONSTRND EDELMIRA II - [id number]   INSERT TIB SURF 3-4 19MM -- CONSTRND Lovetta Cindy [employer] [id number] Right 1 Implanted

## 2017-02-15 LAB
ANION GAP BLD CALC-SCNC: 9 MMOL/L (ref 3–18)
BASOPHILS # BLD AUTO: 0 K/UL (ref 0–0.1)
BASOPHILS # BLD: 0 % (ref 0–2)
BUN SERPL-MCNC: 16 MG/DL (ref 7–18)
BUN/CREAT SERPL: 17 (ref 12–20)
CALCIUM SERPL-MCNC: 8.3 MG/DL (ref 8.5–10.1)
CHLORIDE SERPL-SCNC: 102 MMOL/L (ref 100–108)
CO2 SERPL-SCNC: 28 MMOL/L (ref 21–32)
CREAT SERPL-MCNC: 0.96 MG/DL (ref 0.6–1.3)
DIFFERENTIAL METHOD BLD: ABNORMAL
EOSINOPHIL # BLD: 0 K/UL (ref 0–0.4)
EOSINOPHIL NFR BLD: 0 % (ref 0–5)
ERYTHROCYTE [DISTWIDTH] IN BLOOD BY AUTOMATED COUNT: 14.5 % (ref 11.6–14.5)
GLUCOSE BLD STRIP.AUTO-MCNC: 111 MG/DL (ref 70–110)
GLUCOSE BLD STRIP.AUTO-MCNC: 112 MG/DL (ref 70–110)
GLUCOSE BLD STRIP.AUTO-MCNC: 125 MG/DL (ref 70–110)
GLUCOSE BLD STRIP.AUTO-MCNC: 137 MG/DL (ref 70–110)
GLUCOSE BLD STRIP.AUTO-MCNC: 186 MG/DL (ref 70–110)
GLUCOSE SERPL-MCNC: 135 MG/DL (ref 74–99)
HCT VFR BLD AUTO: 31.3 % (ref 35–45)
HGB BLD-MCNC: 10.3 G/DL (ref 12–16)
INR PPP: 1.3 (ref 0.8–1.2)
LYMPHOCYTES # BLD AUTO: 28 % (ref 21–52)
LYMPHOCYTES # BLD: 1.9 K/UL (ref 0.9–3.6)
MCH RBC QN AUTO: 29.7 PG (ref 24–34)
MCHC RBC AUTO-ENTMCNC: 32.9 G/DL (ref 31–37)
MCV RBC AUTO: 90.2 FL (ref 74–97)
MONOCYTES # BLD: 0.7 K/UL (ref 0.05–1.2)
MONOCYTES NFR BLD AUTO: 10 % (ref 3–10)
NEUTS SEG # BLD: 4.3 K/UL (ref 1.8–8)
NEUTS SEG NFR BLD AUTO: 62 % (ref 40–73)
PLATELET # BLD AUTO: 173 K/UL (ref 135–420)
PMV BLD AUTO: 11.2 FL (ref 9.2–11.8)
POTASSIUM SERPL-SCNC: 3.5 MMOL/L (ref 3.5–5.5)
PROTHROMBIN TIME: 16 SEC (ref 11.5–15.2)
RBC # BLD AUTO: 3.47 M/UL (ref 4.2–5.3)
SODIUM SERPL-SCNC: 139 MMOL/L (ref 136–145)
WBC # BLD AUTO: 6.9 K/UL (ref 4.6–13.2)

## 2017-02-15 PROCEDURE — 97535 SELF CARE MNGMENT TRAINING: CPT

## 2017-02-15 PROCEDURE — 74011250636 HC RX REV CODE- 250/636: Performed by: ORTHOPAEDIC SURGERY

## 2017-02-15 PROCEDURE — 82962 GLUCOSE BLOOD TEST: CPT

## 2017-02-15 PROCEDURE — 74011250637 HC RX REV CODE- 250/637: Performed by: PHYSICIAN ASSISTANT

## 2017-02-15 PROCEDURE — 74011250637 HC RX REV CODE- 250/637: Performed by: ORTHOPAEDIC SURGERY

## 2017-02-15 PROCEDURE — 97165 OT EVAL LOW COMPLEX 30 MIN: CPT

## 2017-02-15 PROCEDURE — 97110 THERAPEUTIC EXERCISES: CPT

## 2017-02-15 PROCEDURE — 65270000029 HC RM PRIVATE

## 2017-02-15 PROCEDURE — 97116 GAIT TRAINING THERAPY: CPT

## 2017-02-15 PROCEDURE — 80048 BASIC METABOLIC PNL TOTAL CA: CPT | Performed by: ORTHOPAEDIC SURGERY

## 2017-02-15 PROCEDURE — 74011636637 HC RX REV CODE- 636/637: Performed by: PHYSICIAN ASSISTANT

## 2017-02-15 PROCEDURE — 85610 PROTHROMBIN TIME: CPT | Performed by: ORTHOPAEDIC SURGERY

## 2017-02-15 PROCEDURE — 85025 COMPLETE CBC W/AUTO DIFF WBC: CPT | Performed by: ORTHOPAEDIC SURGERY

## 2017-02-15 PROCEDURE — 77030012935 HC DRSG AQUACEL BMS -B

## 2017-02-15 PROCEDURE — 36415 COLL VENOUS BLD VENIPUNCTURE: CPT | Performed by: ORTHOPAEDIC SURGERY

## 2017-02-15 RX ORDER — WARFARIN 3 MG/1
3 TABLET ORAL DAILY
Qty: 30 TAB | Refills: 0 | Status: SHIPPED | OUTPATIENT
Start: 2017-02-15 | End: 2017-03-17

## 2017-02-15 RX ORDER — WARFARIN 4 MG/1
8 TABLET ORAL EVERY EVENING
Status: COMPLETED | OUTPATIENT
Start: 2017-02-15 | End: 2017-02-15

## 2017-02-15 RX ORDER — LANOLIN ALCOHOL/MO/W.PET/CERES
325 CREAM (GRAM) TOPICAL 2 TIMES DAILY WITH MEALS
Qty: 60 TAB | Refills: 2 | Status: SHIPPED | OUTPATIENT
Start: 2017-02-15

## 2017-02-15 RX ORDER — COLCHICINE 0.6 MG/1
0.6 TABLET ORAL DAILY
Status: DISCONTINUED | OUTPATIENT
Start: 2017-02-15 | End: 2017-02-17 | Stop reason: HOSPADM

## 2017-02-15 RX ORDER — FEBUXOSTAT 40 MG/1
40 TABLET, FILM COATED ORAL DAILY
Status: DISCONTINUED | OUTPATIENT
Start: 2017-02-15 | End: 2017-02-17 | Stop reason: HOSPADM

## 2017-02-15 RX ORDER — OXYCODONE AND ACETAMINOPHEN 7.5; 325 MG/1; MG/1
1-2 TABLET ORAL
Qty: 60 TAB | Refills: 0 | Status: SHIPPED | OUTPATIENT
Start: 2017-02-15 | End: 2017-03-02 | Stop reason: SDUPTHER

## 2017-02-15 RX ORDER — METFORMIN HYDROCHLORIDE 500 MG/1
500 TABLET ORAL 2 TIMES DAILY WITH MEALS
Status: DISCONTINUED | OUTPATIENT
Start: 2017-02-15 | End: 2017-02-17 | Stop reason: HOSPADM

## 2017-02-15 RX ADMIN — PREGABALIN 50 MG: 50 CAPSULE ORAL at 09:01

## 2017-02-15 RX ADMIN — OXYBUTYNIN CHLORIDE 10 MG: 5 TABLET, FILM COATED, EXTENDED RELEASE ORAL at 09:00

## 2017-02-15 RX ADMIN — METFORMIN HYDROCHLORIDE 500 MG: 500 TABLET, FILM COATED ORAL at 17:17

## 2017-02-15 RX ADMIN — Medication 10 ML: at 17:14

## 2017-02-15 RX ADMIN — FEBUXOSTAT 40 MG: 40 TABLET ORAL at 12:34

## 2017-02-15 RX ADMIN — HYDROCHLOROTHIAZIDE 12.5 MG: 25 TABLET ORAL at 08:59

## 2017-02-15 RX ADMIN — Medication 10 ML: at 21:58

## 2017-02-15 RX ADMIN — WARFARIN SODIUM 8 MG: 4 TABLET ORAL at 17:15

## 2017-02-15 RX ADMIN — CEFAZOLIN SODIUM 2 G: 2 SOLUTION INTRAVENOUS at 08:59

## 2017-02-15 RX ADMIN — OXYCODONE HYDROCHLORIDE AND ACETAMINOPHEN 1 TABLET: 7.5; 325 TABLET ORAL at 04:56

## 2017-02-15 RX ADMIN — COLCHICINE 0.6 MG: 0.6 TABLET, FILM COATED ORAL at 12:34

## 2017-02-15 RX ADMIN — OXYCODONE HYDROCHLORIDE 20 MG: 20 TABLET, FILM COATED, EXTENDED RELEASE ORAL at 09:00

## 2017-02-15 RX ADMIN — Medication 325 MG: at 17:15

## 2017-02-15 RX ADMIN — OXYCODONE HYDROCHLORIDE AND ACETAMINOPHEN 2 TABLET: 7.5; 325 TABLET ORAL at 17:19

## 2017-02-15 RX ADMIN — OXYCODONE HYDROCHLORIDE AND ACETAMINOPHEN 2 TABLET: 7.5; 325 TABLET ORAL at 12:35

## 2017-02-15 RX ADMIN — INSULIN LISPRO 2 UNITS: 100 INJECTION, SOLUTION INTRAVENOUS; SUBCUTANEOUS at 12:35

## 2017-02-15 RX ADMIN — METOPROLOL SUCCINATE 50 MG: 50 TABLET, EXTENDED RELEASE ORAL at 09:00

## 2017-02-15 RX ADMIN — PANTOPRAZOLE SODIUM 40 MG: 40 TABLET, DELAYED RELEASE ORAL at 09:00

## 2017-02-15 RX ADMIN — AMLODIPINE BESYLATE 10 MG: 10 TABLET ORAL at 08:59

## 2017-02-15 RX ADMIN — VALSARTAN 160 MG: 160 TABLET, FILM COATED ORAL at 09:00

## 2017-02-15 RX ADMIN — PREGABALIN 50 MG: 50 CAPSULE ORAL at 17:16

## 2017-02-15 RX ADMIN — Medication 325 MG: at 09:00

## 2017-02-15 RX ADMIN — PRAVASTATIN SODIUM 20 MG: 20 TABLET ORAL at 21:58

## 2017-02-15 RX ADMIN — OXYCODONE HYDROCHLORIDE 20 MG: 20 TABLET, FILM COATED, EXTENDED RELEASE ORAL at 21:58

## 2017-02-15 NOTE — PROGRESS NOTES
Problem: Self Care Deficits Care Plan (Adult)  Goal: *Acute Goals and Plan of Care (Insert Text)  Outcome: Resolved/Met Date Met:  02/15/17  OCCUPATIONAL THERAPY EVALUATION/DISCHARGE     Patient: Ania Hughes (24 y.o. female)  Date: 2/15/2017  Primary Diagnosis: Primary osteoarthritis of right knee [M17.11]  Procedure(s) (LRB):  RIGHT TOTAL KNEE ARTHROPLASTY (Right) 1 Day Post-Op   Precautions:   Fall, WBAT      ASSESSMENT AND RECOMMENDATIONS:  Based on the objective data described below, the patient is able to perform basic self care tasks without assistance. Supervision given for functional standing and transfers. Will defer to PT for mobility training. She has needed DME for bathroom safety. Skilled occupational therapy is not indicated at this time. Discharge Recommendations: None  Further Equipment Recommendations for Discharge: N/A       COMPLEXITY      Eval Complexity: History: LOW Complexity : Brief history review ; Examination: LOW Complexity : 1-3 performance deficits relating to physical, cognitive , or psychosocial skils that result in activity limitations and / or participation restrictions ; Decision Making:LOW Complexity : No comorbidities that affect functional and no verbal or physical assistance needed to complete eval tasks  Assessment: Low Complexity          G-CODES:      Self Care  Current  CI= 1-19%   Goal  CI= 1-19%   D/C  CI= 1-19%. The severity rating is based on the Level of Assistance required for Functional Mobility and ADLs.       SUBJECTIVE:   Patient stated I do want to put my underwear on.      OBJECTIVE DATA SUMMARY:       Past Medical History   Diagnosis Date    Advance directive discussed with patient 8/17/2016    Aspirin intolerance 1/26/2017    Asthma         bronchitis    Cancer ([facility] [address] 1998       Left breast cancer    Cervical radiculopathy      Cigarette nicotine dependence in remission 8/17/2016    DDD (degenerative disc disease), lumbar      Degeneration of cervical intervertebral disc      Degeneration of thoracic intervertebral disc      Diabetes mellitus ([facility] [address]         type 2    Diabetes mellitus type 2, controlled ([facility] [address] 8/2/2016    Essential hypertension with goal blood pressure less than 140/90 8/10/2016    GERD (gastroesophageal reflux disease)      GI bleed 6/29/16    Gout      H. pylori infection 7/11/2016    H/O: GI bleed 1/26/2017    Hammer toe of left foot      Hypercholesterolemia      Hypertension      Lumbago with sciatica      Lumbar radiculopathy      Malignant neoplasm of left female breast ([facility] [address] 8/2/2016    Mixed incontinence 8/10/2016    Obesity, Class I, BMI 30-34.9 8/2/2016    osteoarthritis      Osteoarthrosis, generalized, involving multiple sites      Osteoporosis      Paronychia of finger      Primary insomnia 8/10/2016    Pure hypercholesterolemia 2/9/2017    Sciatica of left side      Scoliosis      Segmental and somatic dysfunction of lumbar region      Segmental and somatic dysfunction of pelvic region      Urinary tract infection      Vertigo      Vitamin B12 deficiency      Vitamin D deficiency      Wound infection       Past Surgical History   Procedure Laterality Date    Hx tubal ligation Bilateral      Hx mastectomy Left 1998     Prior Level of Function/Home Situation: Pt was independent with basic self care tasks and functional mobility PTA.   Home Situation  Home Environment: Private residence  # Steps to Enter: 2  Rails to Enter: No  One/Two Story Residence: One story  Living Alone: No  Support Systems: Family member(s)  Patient Expects to be Discharged to[de-identified] Private residence  Current DME Used/Available at Home: Maya Brighter, rollator  Tub or Shower Type: Tub/Shower combination (with seat)  [X]     Right hand dominant       [ ]     Left hand dominant  Cognitive/Behavioral Status:  Neurologic State: Alert  Orientation Level: Oriented X4  Cognition: Follows commands  Safety/Judgement: Awareness of environment; Fall prevention  Skin: Intact on UEs  Edema: None noted in UEs  Vision/Perceptual:    Acuity: Within Defined Limits    Corrective Lenses: Glasses  Coordination:  Fine Motor Skills-Upper: Left Intact; Right Intact    Gross Motor Skills-Upper: Left Intact; Right Intact  Balance:  Sitting: Intact  Standing: With support  Strength:  Strength: Within functional limits (UEs)  Tone & Sensation:  Tone: Normal (UEs)  Sensation: Intact (UEs)  Range of Motion:  AROM: Within functional limits (UEs)  Functional Mobility and Transfers for ADLs:  Bed Mobility:  Supine to Sit: Supervision  Sit to Supine: Supervision  Transfers:  Sit to Stand: Supervision              Toilet Transfer : Supervision  ADL Assessment:  Feeding: Independent     Oral Facial Hygiene/Grooming: Independent     Bathing: Supervision     Upper Body Dressing: Independent     Lower Body Dressing: Supervision     Toileting: Supervision      ADL Intervention:  Patient donned socks and underwear in sitting and standing using RW with supervision. No LOB noted in standing. Cognitive Retraining  Safety/Judgement: Awareness of environment; Fall prevention     Pain:  Pt reports 4/10 pain or discomfort prior to treatment, in right knee. Pain meds already given. Pt reports 4/10 pain or discomfort post treatment, in right knee. Patient resting in bed at end of session. Activity Tolerance:   Good  Please refer to the flowsheet for vital signs taken during this treatment. After treatment:   [ ]  Patient left in no apparent distress sitting up in chair  [X]  Patient left in no apparent distress in bed  [X]  Call bell left within reach  [ ]  Nursing notified  [ ]  Caregiver present  [ ]  Bed alarm activated      COMMUNICATION/EDUCATION:   Communication/Collaboration:  [X]      Home safety education was provided and the patient/caregiver indicated understanding.   [X]      Patient/family have participated as able and agree with findings and recommendations. [ ]      Patient is unable to participate in plan of care at this time.      Azeem Feliciano MS OTR/L  Time Calculation: 25 mins

## 2017-02-15 NOTE — PROGRESS NOTES
Care Management Interventions  PCP Verified by CM: Yes  Physical Therapy Consult: Yes  Occupational Therapy Consult: Yes  Current Support Network: Relative's Home, Other  Confirm Follow Up Transport: Family  Plan discussed with Pt/Family/Caregiver: Yes  Freedom of Choice Offered: Yes (For home health/SNF)  Discharge Location  Discharge Placement: Skilled nursing facility     Pt is a 79year old female admitted for primary osteoarthritis. Pt is alert and oriented and alone in room. Pt states that she resides in the home with her son Adela Waddell [phone] and his family. 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 Pt states her plan is to return home with home health at discharge. FOC presented and pt chooses [facility]. Pt indicates being independent with ADLs and ambulation (with some minor difficulties) prior to admission. Upon exiting pt's room, pt's son was at the nurse's station waiting to speak with CM. Pt's son shares that caring for his parents have become a burden for him and his wife, and he has been to the Dept of  for the [facility] [employer]. He mentions that they have assisted him with finding placement for his father. He shares that pt would benefit from SNF placement vs home with home health. Pt's son shares that he will speak with pt to encourage her on going to SNF placement. CM reviewed PT notes which recommends home health. CM contacted pt's son who shares that he is ok with pt returning home with home health services.

## 2017-02-15 NOTE — PROGRESS NOTES
Pain score:  3 out of 10  Name and time of last pain med given:  Percocet 7.5 two tablets 1235    Neuro status: intact, no deficit noted     Dressing/incision status:  Change per order by Viviana Norris RN    IDALIA/Hemovace output:  No drain, Drain D/C by Joseph Piña    Voiding status:  VOIDING WITHOUT DIFFICULTIES    Mobility status:  Up with one person assistance    Other:  Alert and oriented x 4, pedal pulses present and palpable, lungs clear bilaterally, bowel sounds active in all quadrants, patient denies any acute distress, sob and chest pain. She tolerate regular food well, no complain of nausea or vomiting. Patient is able to wiggle toes on command, no circulatory issue noted, cap refill appropriate. Patient is stable, vitals WNL, no apparent distress noted, no neurological deficit noted, placed call bell wthin reach, will continue to monitor.

## 2017-02-15 NOTE — PROGRESS NOTES
Ortho    Pt. Seen and evaluated. Doing well, pain well controlled, up in chair  Denies cp, sob, abd pain    Blood pressure 147/72, pulse 72, temperature 98.2 °F (36.8 °C), resp. rate 18, height 5' 4\" (1.626 m), weight 177 lb 3.2 oz (80.4 kg), SpO2 97 %.    rightKnee woundclean, dry, no drainage  Sensory intact to LT  Motor intact  nv intact  Neg calf tenderness    Labs:  CBC  @  CBC:   Lab Results   Component Value Date/Time    WBC 6.9 02/15/2017 03:50 AM    RBC 3.47 02/15/2017 03:50 AM    HGB 10.3 02/15/2017 03:50 AM    HCT 31.3 02/15/2017 03:50 AM    PLATELET 355 64/55/7771 03:50 AM     BMP:   Lab Results   Component Value Date/Time    Glucose 135 02/15/2017 03:50 AM    Sodium 139 02/15/2017 03:50 AM    Potassium 3.5 02/15/2017 03:50 AM    Chloride 102 02/15/2017 03:50 AM    CO2 28 02/15/2017 03:50 AM    BUN 16 02/15/2017 03:50 AM    Creatinine 0.96 02/15/2017 03:50 AM    Calcium 8.3 02/15/2017 03:50 AM   @  Coagulation  Lab Results   Component Value Date    INR 1.3 (H) 02/15/2017    APTT 26.9 02/08/2017      Basic Metabolic Profile  Lab Results   Component Value Date     02/15/2017    CO2 28 02/15/2017    BUN 16 02/15/2017       Assesment: right Orthopedic / Rheumatologic: Total Knee Replacement    Plan: coumadin, PT, plan for home tomorrow

## 2017-02-15 NOTE — DIABETES MGMT
Glycemic Control Plan of Care    Assessment/Recommendations:  Patient is 79year old with past medical history including type 2 diabetes mellitus, left breast cancer, hypertension, and hypercholesterolemia - was admitted on 02/14/2017 for elective ortho procedure. Noted:  Primary osteoarthritis of right knee status post right total knee arthroplasty on 02/14/2017. Type 2 diabetes mellitus with current A1C of 6.7% (02/14/2017). POC BG range on 02/14/2017:  mg/dL. POC BG report on 02/15/2017 at time of review: 112 mg/dL. Patient received 4 mg of dexamethasone on 02/14/2017 and required a total of 8 units of correctional lispro insulin (normal sensitivity dose). Assessed the patient's home diabetes management and discussed glycemic management with patient and she verbalized understanding about use of insulin while inpatient. Recommendation: Continue monitoring and correctional insulin. Most recent blood glucose values:    Results for Maribel Newell (MRN [id number]) as of 2/15/2017 09:55   Ref. Range 2/14/2017 06:24 2/14/2017 10:01 2/14/2017 12:11 2/14/2017 16:06 2/14/2017 17:44 2/14/2017 21:55   GLUCOSE,FAST - POC Latest Ref Range: 70 - 110 mg/dL 98 120 (H) 138 (H) 220 (H) 201 (H) 206 (H)     Results for Maribel Newell (MRN [id number]) as of 2/15/2017 09:55   Ref. Range 2/15/2017 06:28 2/15/2017 08:14   GLUCOSE,FAST - POC Latest Ref Range: 70 - 110 mg/dL 112 (H) 111 (H)     Current A1C: 6.7% (02/14/2017) is equivalent to average blood glucose of 146 mg/dL during the past 2-3 months. Current hospital diabetes medications:  Correctional lispro insulin 4x daily (before meals and bedtime). Normal sensitivity dose. Total daily dose insulin requirement previous day:  02/14/2017  Lispro: 8 units    Home diabetes medications: As stated by patient on 02/15/2017:  Metformin 500 mg twice daily with meals.     Diet:  Diabetic consistent carb regular    Goals:  Blood glucose will be within target range of  mg/dL by 02/18/2017    Education:  _X__  Refer to Diabetes Education Record: 02/15/2017             ___  Education not indicated at this time    Gricel Steele

## 2017-02-15 NOTE — DISCHARGE SUMMARY
2/14/2017  5:46 AM    2/16/2017, 12:08 PM    Primary Dx:right Orthopedic / Rheumatologic: Total Knee Replacement  Secondary Dx: Etiological Diagnoses: none    HPI:  Pt has end stage OA and had failed conservative treatment. Due to the current findings and affected activity of daily living surgical intervention is indicated.   The alternatives, risks, complications as well as expected outcome were discussed, the patient understands and wishes to proceed with surgery    Past Medical History   Diagnosis Date    Advance directive discussed with patient 8/17/2016    Aspirin intolerance 1/26/2017    Asthma      bronchitis    Cancer ([address] 1998     Left breast cancer    Cervical radiculopathy     Cigarette nicotine dependence in remission 8/17/2016    DDD (degenerative disc disease), lumbar     Degeneration of cervical intervertebral disc     Degeneration of thoracic intervertebral disc     Diabetes mellitus (HCC)      type 2    Diabetes mellitus type 2, controlled ([address] 8/2/2016    Essential hypertension with goal blood pressure less than 140/90 8/10/2016    GERD (gastroesophageal reflux disease)     GI bleed 6/29/16    Gout     H. pylori infection 7/11/2016    H/O: GI bleed 1/26/2017    Hammer toe of left foot     Hypercholesterolemia     Hypertension     Lumbago with sciatica     Lumbar radiculopathy     Malignant neoplasm of left female breast ([address] 8/2/2016    Mixed incontinence 8/10/2016    Obesity, Class I, BMI 30-34.9 8/2/2016    osteoarthritis     Osteoarthrosis, generalized, involving multiple sites     Osteoporosis     Paronychia of finger     Primary insomnia 8/10/2016    Pure hypercholesterolemia 2/9/2017    Sciatica of left side     Scoliosis     Segmental and somatic dysfunction of lumbar region     Segmental and somatic dysfunction of pelvic region     Urinary tract infection     Vertigo     Vitamin B12 deficiency     Vitamin D deficiency     Wound infection Current Facility-Administered Medications:     warfarin (COUMADIN) tablet 8 mg, 8 mg, Oral, QPM, Glory Alvarado PA-C    [START ON 2/16/2017] WARFARIN INFORMATION NOTE (COUMADIN), , Other, Q24H, Glory Alvarado PA-C    colchicine tablet 0.6 mg, 0.6 mg, Oral, DAILY, Luke Hankins PA-C    febuxostat (ULORIC) tablet 40 mg, 40 mg, Oral, DAILY, Luke Hankins PA-C    metFORMIN (GLUCOPHAGE) tablet 500 mg, 500 mg, Oral, BID WITH MEALS, Luke Hankins PA-C    HYDROmorphone (PF) (DILAUDID) injection 0.5 mg, 0.5 mg, IntraVENous, Q15MIN PRN, Jluis Pierce CRNA    amLODIPine (NORVASC) tablet 10 mg, 10 mg, Oral, DAILY, Arnold Dorsey MD, 10 mg at 02/15/17 0859    LORazepam (ATIVAN) tablet 1 mg, 1 mg, Oral, Q8H PRN, Arnold Dorsey MD    metoprolol succinate (TOPROL-XL) XL tablet 50 mg, 50 mg, Oral, DAILY, Arnold Dorsey MD, 50 mg at 02/15/17 0900    oxybutynin chloride XL (DITROPAN XL) tablet 10 mg, 10 mg, Oral, DAILY, Arnold Dorsey MD, 10 mg at 02/15/17 0900    pantoprazole (PROTONIX) tablet 40 mg, 40 mg, Oral, ACB, Arnold Dorsey MD, 40 mg at 02/15/17 0900    pravastatin (PRAVACHOL) tablet 20 mg, 20 mg, Oral, QHS, Arnold Dorsey MD, 20 mg at 02/14/17 2150    sodium chloride (NS) flush 5-10 mL, 5-10 mL, IntraVENous, Q8H, Arnold Dorsey MD, 10 mL at 02/14/17 2150    sodium chloride (NS) flush 5-10 mL, 5-10 mL, IntraVENous, PRN, Arnold Dorsey MD    naloxone [facility]) injection 0.4 mg, 0.4 mg, IntraVENous, PRN, Arnold Dorsey MD    ferrous sulfate tablet 325 mg, 1 Tab, Oral, BID WITH MEALS, Arnold Dorsey MD, 325 mg at 02/15/17 0900    diphenhydrAMINE (BENADRYL) injection 12.5 mg, 12.5 mg, IntraVENous, Q6H PRN, Arnold Dorsey MD    zolpidem (AMBIEN) tablet 5 mg, 5 mg, Oral, QHS PRN, Arnold Dorsey MD    acetaminophen (TYLENOL) tablet 650 mg, 650 mg, Oral, Q4H PRN, Arnold Dorsey MD    oxyCODONE-acetaminophen (PERCOCET 7.5) 7.5-325 mg per tablet 1-2 Tab, 1-2 Tab, Oral, Q4H PRN, Dot Mazariegos Neena Arce MD, 1 Tab at 02/15/17 0456    ondansetron (ZOFRAN) injection 4 mg, 4 mg, IntraVENous, Q4H PRN, Stephanie Howe MD    magnesium hydroxide (MILK OF MAGNESIA) 400 mg/5 mL oral suspension 30 mL, 30 mL, Oral, DAILY PRN, Stephanie Howe MD    pregabalin (LYRICA) capsule 50 mg, 50 mg, Oral, BID, Stephanie Howe MD, 50 mg at 02/15/17 0901    oxyCODONE ER (OxyCONTIN) tablet 20 mg, 20 mg, Oral, Q12H, Stephanie Howe MD, 20 mg at 02/15/17 0900    valsartan (DIOVAN) tablet 160 mg, 160 mg, Oral, DAILY, 160 mg at 02/15/17 0900 **AND** hydroCHLOROthiazide (HYDRODIURIL) tablet 12.5 mg, 12.5 mg, Oral, DAILY, Stephanie Howe MD, 12.5 mg at 02/15/17 0859    insulin lispro (HUMALOG) injection, , SubCUTAneous, AC&HS, Bianca Whittaker PA-C, Stopped at 02/15/17 0901    glucose chewable tablet 16 g, 4 Tab, Oral, PRN, Angel Hankins PA-C    glucagon [facility]) injection 1 mg, 1 mg, IntraMUSCular, PRN, Angel Hankins PA-C    dextrose (D50W) injection syrg 12.5-25 g, 25-50 mL, IntraVENous, PRN, Angel Hankins PA-C    albuterol-ipratropium (DUO-NEB) 2.5 MG-0.5 MG/3 ML, 3 mL, Nebulization, Q6H PRN, Angel Hankins PA-C      Nsaids (non-steroidal anti-inflammatory drug) and Aspirin    Physical Exam:  General A&O x3 NAD, well developed, well nourished, normal affect  Heart: S1-S2, RRR  Lungs: CTA Bilat  Abd: soft NT, ND  Ext: n/v intact    Hospital Course:    Pt. Had rightOrthopedic / Rheumatologic: Total Knee Replacement    Post -op Course: The patient tolerated the procedure well. They were followed by internal medicine for help with medical management. Pt. Was place on Abx pre and post-op for prophylaxis against infection as well as coumadin pre and post-op for prophylaxis against DVT. Vitals signs remained stable, remained af. The wound wasclean, dry, no drainage. Pain was well controlled. Pt. Had negative calf tenderness or swelling, no evidence for DVT. Patient had PT/OT consult for evaluation and treatment. CBC  Lab Results   Component Value Date/Time    WBC 6.9 02/15/2017 03:50 AM    RBC 3.47 (L) 02/15/2017 03:50 AM    HCT 31.3 (L) 02/15/2017 03:50 AM    MCV 90.2 02/15/2017 03:50 AM    MCH 29.7 02/15/2017 03:50 AM    MCHC 32.9 02/15/2017 03:50 AM    RDW 14.5 02/15/2017 03:50 AM     Coagulation  Lab Results   Component Value Date    INR 1.3 (H) 02/15/2017    APTT 26.9 02/08/2017      Basic Metabolic Profile  Lab Results   Component Value Date     02/15/2017    CO2 28 02/15/2017    BUN 16 02/15/2017       Discharge Plan:  The patient will be d/c'd to home, total knee protocol, WBAT. She will have [facility] PT and nursing. Total joint protocol. Pt safe for homebound transfer, sp Total joint replacement. A walker, bedside commode, and shower chair will be utilized for ADL's. Follow up with Dr. Mari Pa in 10-12 days. Call with any questions or concerns.

## 2017-02-15 NOTE — PROGRESS NOTES
Problem: Mobility Impaired (Adult and Pediatric)  Goal: *Acute Goals and Plan of Care (Insert Text)  STGs to be addressed within 3 days:  1. Bed mobility: Supine to sit to supine S with HR for meals. 2. Activity tolerance: Tolerate up in chair 1-2 hrs for ADLs. 3. Transfers: Sit to stand to chair S with LRAD for ADLs. LTGs to be addressed within 7 days:  1. Standing/Ambulation Balance: Increase to Good with LRAD for safe transfers and gait. 2. Ambulation: Ambulate > 200 ft. S with LRAD for home mobility. 3. Patient Education: Independent with HEP for home safety. 4. Stairs: Up/Down 2 steps CGA with HR for home entry. Outcome: Progressing Towards Goal  PHYSICAL THERAPY TREATMENT     Patient: Kelsie Garcia (44 y.o. female)  Date: 2/15/2017  Diagnosis: Primary osteoarthritis of right knee [M17.11] <principal problem not specified>  Procedure(s) (LRB):  RIGHT TOTAL KNEE ARTHROPLASTY (Right) 1 Day Post-Op  Precautions: Fall, WBAT  Chart, physical therapy assessment, plan of care and goals were reviewed. ASSESSMENT:  Pt continues to progress towards goals this afternoon; pt is somewhat off balance secondary to pain meds limiting gait training. ROM 0-90 degrees. Progression toward goals:  [X]      Improving appropriately and progressing toward goals  [ ]      Improving slowly and progressing toward goals  [ ]      Not making progress toward goals and plan of care will be adjusted       PLAN:  Patient continues to benefit from skilled intervention to address the above impairments. Continue treatment per established plan of care. Discharge Recommendations:  Home Health  Further Equipment Recommendations for Discharge:  rolling walker       SUBJECTIVE:   Patient stated I get to go home tomorrow.       OBJECTIVE DATA SUMMARY:   Critical Behavior:  Neurologic State: Alert  Orientation Level: Oriented X4  Cognition: Follows commands  Safety/Judgement: Awareness of environment, Fall prevention  Functional Mobility Training:  Bed Mobility:  Rolling: Modified independent  Supine to Sit: Supervision  Sit to Supine: Supervision  Scooting: Supervision  Transfers:  Sit to Stand: Supervision  Stand to Sit: Supervision  Balance:  Sitting: Intact  Standing: With support  Standing - Static: Good  Standing - Dynamic : Fair  Ambulation/Gait Training:  Distance (ft): 15 Feet (ft) (X2)  Assistive Device: Walker  Ambulation - Level of Assistance: Stand-by asssistance  Gait Abnormalities: Antalgic;Decreased step clearance; Step to gait  Base of Support: Widened  Speed/Nadya: Slow  Stairs - Level of Assistance:  (unable at this time )  Therapeutic Exercises: Ankle pumps, quad sets, glute squeeze, hip abd, heel slides  Pain:  Pt pain was reported as  1 pre-treatment. Pt pain was reported as 1 post-treatment. Intervention: jet stream cooler     Activity Tolerance:   Good   Please refer to the flowsheet for vital signs taken during this treatment.   After treatment:   [X] Patient left in no apparent distress sitting up in chair  [ ] Patient left in no apparent distress in bed  [X] Call bell left within reach  [ ] Nursing notified  [ ] Caregiver present  [ ] Bed alarm activated      Andrea Baez PTA   Time Calculation: 12 mins

## 2017-02-15 NOTE — DIABETES MGMT
Diabetes Patient/Family Education Record    Factors That  May Influence Patients Ability  to Learn or  Comply With  Recommendations:    []   Language barrier    []   Cultural needs   []   Motivation    []   Cognitive limitation    []   Physical   []   Education    []   Physiological factors   []   Hearing/vision/speaking impairment   []   Roman Catholic beliefs    []   Financial factors   []  Other:   [x]  No factors identified at this time. Person Instructed:   [x]   Patient   []   Family   []  Other     Preference for Learning:   [x]   Verbal   []   Written   []  Demonstration     Level of Comprehension & Competence:    [x]  Good                                      [] Fair                                     []  Poor                             []  Needs Reinforcement   [x]  Teachback completed    Education Component:   [x]  Medication management, including how to administer insulin (if appropriate) and potential medication interactions: Patient stated that she is taking Metformin 500 mg twice daily with meals as prescribed. [x]  Nutritional management: Patient stated that she eats chinese (fried rice and eggrolls) only once in a while because it is not good for her blood sugar. [x]  Exercise   [x]  Signs, symptoms, and treatment of hyperglycemia and hypoglycemia   [x]  Prevention, recognition and treatment of hyperglycemia and hypoglycemia   [x]  Importance of blood glucose monitoring and how to obtain a blood glucose meter: patient stated that she checks her blood sugar 2x daily at home.    []  Instruction on use of blood glucose meter   [x]  Discuss the importance of HbA1C monitoring and provide patient with results: 6.7% (2/14/2017) is equivalent to average blood glucose of 146 mg/dL during the past 2-3 months. Patient was pleased to know that her A1C is <7%.  Encouraged patient to continue to follow her diabetes treatment plan.   []  Sick day guidelines   []  Proper use and disposal of lancets, needles, syringes or insulin pens (if appropriate)   [x]  Potential long-term complications (retinopathy, kidney disease, neuropathy, heart disease, stroke, vascular disease, foot care)   [x] Provide emergency contact number and contact number for more information    [x]  Goal:  Patient/family will demonstrate understanding of Diabetes Self Management Skills by:  02/22/2017  Plan for post-discharge education or self-management support:    [] Outpatient class schedule provided            [x] Patient Declined: Stated that her son is an RN and helps with diabetes mgt.     [] Scheduled for outpatient classes (date) _______         Estefany Hankins RN

## 2017-02-15 NOTE — PROGRESS NOTES
[facility]  Pulmonary, Critical Care, and Sleep Medicine    Name: Yee Gambino MRN: [id number]   : 1950 Hospital: [address] [place] [address] Dr   Date: 2/15/2017        IMPRESSION:   · End Stage OA R knee, failing conservative therapy - S/P TKR - R knee  · Asthma - Takes Allegra and Combi vent inhaler at home. · Prior Smoker (quit about 41 yrs ago)  · DM II - Not well controlled. On Metformin. HgBA1C (16) - 8.4  · HTN - Goal BP <140/90; BP is at goal w/ Norvasc, Toprol and Valsartan   · Hx of GI Bleed  · Hypercholesterolemia  · OA  · Osteoporosis  · Obesity  · Vitamin B12 Deficiency  · Vitamin D Deficiency  · Vertigo  · Hx of UTI - Recently completed course of ABX prior to surgery. · Hx of Gout - Not well controlled. On Colchicine & Uloric      RECOMMENDATIONS:   · Resp: SP02 >90%. Titrate supp O2 PRN. Currently resting comfortably on NC @ 2LPM. Duo-nebs q6 PRN. Aspiration precautions, HOB >30 degrees. Aggressive pulmonary toileting. Encourage incentive spirometry, PT/OT eval & treat, mobilization. · ID: Post-Op ABX: Cefazolin 2g q8 x3 doses - per Ortho. Afebrile. Monitor for signs of infection. · CVS: Daily CBC. Continue home meds - Norvasc, Metoprolol, Prevastatin, Diovan, HCTZ. · Heme/Onc: Monitor hemodynamics; monitor for trends. Hgb 10.3 this morning. Continue to monitor PT/INR. · Metabolic: Monitor lytes; replace PRN. Ca++ slightly low. Will monitor. · Renal: Monitor renal function. Cr. .96   · Endocrine: Pt has DM II. Glycemic Control - SSI. B.S Goal <140. POC Glucose today - 111. POC glucose checks AC&HS. Is on home med - Metformin -restarting today,  Will restart Uloric and Colchicine for Gout today. · GI: Pt will be on diabetic diet; PPI - Protonix 40mg daily. · Neuro/Pain: Management per Ortho. Tylenol 650mg q4 PRN; OxyCodone ER - 20mg q12; Percocet - 7.5/325mg q4 PRN. · DVT, PUD prophylaxis - Management per Ortho.      Will continue to follow from medical point of view  ·   · Quality Care: PPI, DVT prophylaxis, HOB elevated, infection control all reviewed and addressed. · Events and notes from last 24 hours reviewed. · Care Time: >35 min. · Prior/Old records reviewed and discussed with patient. · Labs, Images and available PFT and sleep study discussed with patient. Labs and images personally seen and available reports reviewed with patient. · All current medicines are reviewed and doses and prescription adjusted. · Further management depending on test results and work up as outlined above. Subjective/Interval History:     Pt resting comfortably in bed. Denies any pain or complaint. Denies H/A, KIM, CP, palpitations, abd pain, N/V/D, fever/chills, urinary symptoms. Pt has not had a BM since surgery yet, but is eating now and is in no distress. ROS:A comprehensive review of systems was negative. Objective:   Vital Signs:    Visit Vitals    /70    Pulse 67    Temp 96.7 °F (35.9 °C)    Resp 19    Ht 5' 4\" (1.626 m)    Wt 80.4 kg (177 lb 3.2 oz)    SpO2 94%    BMI 30.42 kg/m2       O2 Device: Nasal cannula   O2 Flow Rate (L/min): 2 l/min   Temp (24hrs), Av.9 °F (36.1 °C), Min:96.4 °F (35.8 °C), Max:97.8 °F (36.6 °C)       Intake/Output:   Last shift:         Last 3 shifts:  1901 - 02/15 0700  In: 0207 [P.O.:680; I.V.:2600]  Out: 2030 [Urine:1600; Drains:380]    Intake/Output Summary (Last 24 hours) at 02/15/17 0938  Last data filed at 02/15/17 2619   Gross per 24 hour   Intake             2180 ml   Output             1980 ml   Net              200 ml        Physical Exam:    General: Resting comfortably on RA, NAD,appears stated age. HEENT: pupils reactive, sclera anicteric, EOM intact   Neck: No adenopathy or thyroid swelling, no lymphadenopathy or JVD, supple   CVS: RRR, S1S2 normal; No m/r/g   RS: Symmetrical chest rise; CTAB; No wheezes/rhonchi/rales noted.    Abd: soft, non tender; +B.S x4; No hepatosplenomegaly   Neuro: non focal, awake, alert   Lymph node: No obvious palpable lymph node appreciated. Skin: No rash   Extremities: Normal, atraumatic, no cyanosis or edema. Dressings examined. right leg Hemovac    Prophylaxis- DVT: appropriate       Surgical site: appropiate        DATA:  Labs:  Recent Labs      02/15/17   0350   WBC  6.9   HGB  10.3*   HCT  31.3*   PLT  173     Recent Labs      02/15/17   0350   NA  139   K  3.5   CL  102   CO2  28   GLU  135*   BUN  16   CREA  0.96   CA  8.3*   INR  1.3*     No results for input(s): PH, PCO2, PO2, HCO3, FIO2 in the last 72 hours. Imaging: No new imaging 02/15/17.       Lonnie Degroot PA-C

## 2017-02-15 NOTE — PROGRESS NOTES
Progress Note  Pulmonary, Critical Care, and Sleep Medicine    Name: Tavon Alejandra MRN: [id number]   : 1950 Hospital: [address] [place] [address] Dr   Date: 2/15/2017        IMPRESSION:   · End stage OS right knee S/p TKR 2017  · Asthma well controlled   · Remote smoking history  · DM well controlled  · Osteoporosis  · Obesity  · Vertigo       PLAN:   · Titrate O2 to off if possible  · Prn bronchodilators  · Resume Uloric and Clochicine  · Resume Metformin today  · Diabetic diet  · Pain management and DVT prophylaxis per Ortho     Subjective/Interval History:   I have reviewed the flowsheet and previous days notes. Reviewed interval history. Discussed management with nursing staff. No new complaints. Post op pain slightly worse today but still tolerable      ROS:Pertinent items are noted in HPI. Orders reviewed including medications. Changes made if indicated. Telemetry monitor reviewed at the bedside. Objective:   Vital Signs:    Visit Vitals    /72 (BP 1 Location: Right arm, BP Patient Position: At rest)    Pulse 72    Temp 98.2 °F (36.8 °C)    Resp 18    Ht 5' 4\" (1.626 m)    Wt 80.4 kg (177 lb 3.2 oz)    SpO2 97%    BMI 30.42 kg/m2       O2 Device: Nasal cannula   O2 Flow Rate (L/min): 2 l/min   Temp (24hrs), Av.2 °F (36.2 °C), Min:96.5 °F (35.8 °C), Max:98.2 °F (36.8 °C)       Intake/Output:   Last shift:      02/15 07 - 02/15 1900  In: -   Out: 20 [Drains:20]  Last 3 shifts: 1901 - 02/15 0700  In: 5103 [P.O.:680; I.V.:2600]  Out: 2030 [Urine:1600; Drains:380]    Intake/Output Summary (Last 24 hours) at 02/15/17 1253  Last data filed at 02/15/17 1045   Gross per 24 hour   Intake             1780 ml   Output             1870 ml   Net              -90 ml        Physical Exam:    General:  Alert, cooperative, in no distress, appears stated age. Head:  Normocephalic, without obvious abnormality, atraumatic. Eyes:  ANicteric, PERRL,   Nose: Nares normal. Septum midline.  Mucosa normal. No drainage or sinus tenderness. Throat: Lips, mucosa, and tongue normal.  NO Thrush   Neck: Supple, symmetrical, trachea midline, no adenopathy, thyroid: no enlargment/tenderness/nodules    Back:   Symmetric    Lungs:   Bilateral auscultation no rales or wheezes   Chest wall:  No tenderness or deformity. NO intercostal retractions   Heart:  Regular rate and rhythm, S1, S2 normal, no murmur, click, rub or gallop. Abdomen:   Soft, non-tender. Obese, Bowel sounds normal. No masses,  No organomegaly. NO paradoxical motion   Extremities: Post op, no cyanosis or edema. Pulses: 2+ and symmetric all extremities. Skin: Skin color, texture, turgor normal. No rashes or lesions. NO clubbing   Lymph nodes: Cervical  nodes normal.   Neurologic: Grossly nonfocal      :        Devices:             Drips:    DATA:  Labs:  Recent Labs      02/15/17   0350   WBC  6.9   HGB  10.3*   HCT  31.3*   PLT  173     Recent Labs      02/15/17   0350   NA  139   K  3.5   CL  102   CO2  28   GLU  135*   BUN  16   CREA  0.96   CA  8.3*   INR  1.3*     No results for input(s): PH, PCO2, PO2, HCO3, FIO2 in the last 72 hours. Imaging:  []        I have personally reviewed the patients radiographs and reports  []         []        No change from prior, tubes and lines in adequate position  []        Improved   []        Worsening  High complexity decision making was performed during this consultation and evaluation.       Perla Craig MD

## 2017-02-15 NOTE — PROGRESS NOTES
Problem: Mobility Impaired (Adult and Pediatric)  Goal: *Acute Goals and Plan of Care (Insert Text)  STGs to be addressed within 3 days:  1. Bed mobility: Supine to sit to supine S with HR for meals. 2. Activity tolerance: Tolerate up in chair 1-2 hrs for ADLs. 3. Transfers: Sit to stand to chair S with LRAD for ADLs. LTGs to be addressed within 7 days:  1. Standing/Ambulation Balance: Increase to Good with LRAD for safe transfers and gait. 2. Ambulation: Ambulate > 200 ft. S with LRAD for home mobility. 3. Patient Education: Independent with HEP for home safety. 4. Stairs: Up/Down 2 steps CGA with HR for home entry. Outcome: Progressing Towards Goal  PHYSICAL THERAPY TREATMENT     Patient: Stephanie Salazar (48 y.o. female)  Date: 2/15/2017  Diagnosis: Primary osteoarthritis of right knee [M17.11] <principal problem not specified>  Procedure(s) (LRB):  RIGHT TOTAL KNEE ARTHROPLASTY (Right) 1 Day Post-Op  Precautions: Fall, WBAT  Chart, physical therapy assessment, plan of care and goals were reviewed. ASSESSMENT:  Pt doing well this morning, instructed in LE exercises for strength and ROM to improve functional mobility. Pt able to progress with gait training this am with verbal cues for safety with RW during sit<>stand transfers, instruction for sequencing and walker management during gait training. Progression toward goals:  [X]      Improving appropriately and progressing toward goals  [ ]      Improving slowly and progressing toward goals  [ ]      Not making progress toward goals and plan of care will be adjusted       PLAN:  Patient continues to benefit from skilled intervention to address the above impairments. Continue treatment per established plan of care. Discharge Recommendations:  Home Health  Further Equipment Recommendations for Discharge:  rolling walker       SUBJECTIVE:   Patient stated I have been getting up and walking already .       OBJECTIVE DATA SUMMARY:   Critical Behavior:  Neurologic State: Alert  Orientation Level: Oriented X4  Cognition: Follows commands  Safety/Judgement: Awareness of environment, Fall prevention  Functional Mobility Training:  Bed Mobility:  Rolling: Modified independent  Supine to Sit: Supervision  Sit to Supine: Supervision  Scooting: Supervision  Transfers:  Sit to Stand: Supervision  Stand to Sit: Supervision  Balance:  Sitting: Intact  Standing: With support  Standing - Static: Good  Standing - Dynamic : Fair  Ambulation/Gait Training:  Distance (ft): 18 Feet (ft)  Assistive Device: Walker  Ambulation - Level of Assistance: Stand-by asssistance  Gait Abnormalities: Antalgic;Decreased step clearance; Step to gait  Base of Support: Widened  Speed/Nadya: Slow  Stairs - Level of Assistance:  (unable at this time )  Therapeutic Exercises: Ankle pumps, quad set, glute squeeze, hip abd, heel slides  Pain:  Pt pain was reported as  4 pre-treatment. Pt pain was reported as 5 post-treatment. Intervention: jet stream cooler     Activity Tolerance:   Good   Please refer to the flowsheet for vital signs taken during this treatment.   After treatment:   [X] Patient left in no apparent distress sitting up in chair  [ ] Patient left in no apparent distress in bed  [X] Call bell left within reach  [ ] Nursing notified  [X] Caregiver present  [ ] Bed alarm activated      Kingston Burkitt, PTA   Time Calculation: 27 mins

## 2017-02-15 NOTE — PROGRESS NOTES
1944  Received pt in stable condition,   General: lying in bed in supine, not apparent distress, 0 pain, voiced no compliants at this time. Neuro: AOx4, denies numbness, 0 tingling, able to wiggle toes to bilateral extremities  Cardio: pedal pulses palpable, denies chest pain  Respiratory: denies shortness of breath  Skin: Dressing to right knee clean, dry and intact, polar ice in place  GI: voiding  : denies nausea and vomiting  Mus: WBAT to E  Bed in low position and oriented to room and use of call bell. 1753  Patient AOx4, no apparent disstress, dressing to right knee clean, dry and intact. Bilateral lower extremities: pedal pulses present and palpable, denies numbness, able to wiggle toes. No changes in status, in stable condition.

## 2017-02-15 NOTE — PROGRESS NOTES
Bedside and Verbal shift change report given to SINDY Malone (oncoming nurse) by Rashida Lane RN (offgoing nurse). Report included the following information SBAR, Kardex, MAR and Recent Results.     SITUATION:    Code Status: No Order   Reason for Admission: Primary osteoarthritis of right knee [address] day: 1   Problem List:       Hospital Problems  Date Reviewed: 2/14/2017          Codes Class Noted POA    Arthritis of knee ICD-10-CM: M19.90  ICD-9-CM: 716.96  2/14/2017 Unknown              BACKGROUND:    Past Medical History:   Past Medical History   Diagnosis Date    Advance directive discussed with patient 8/17/2016    Aspirin intolerance 1/26/2017    Asthma      bronchitis    Cancer ([facility] [place] [address] 1998     Left breast cancer    Cervical radiculopathy     Cigarette nicotine dependence in remission 8/17/2016    DDD (degenerative disc disease), lumbar     Degeneration of cervical intervertebral disc     Degeneration of thoracic intervertebral disc     Diabetes mellitus (HCC)      type 2    Diabetes mellitus type 2, controlled ([facility] [place] [address] 8/2/2016    Essential hypertension with goal blood pressure less than 140/90 8/10/2016    GERD (gastroesophageal reflux disease)     GI bleed 6/29/16    Gout     H. pylori infection 7/11/2016    H/O: GI bleed 1/26/2017    Hammer toe of left foot     Hypercholesterolemia     Hypertension     Lumbago with sciatica     Lumbar radiculopathy     Malignant neoplasm of left female breast ([facility] [place] [address] 8/2/2016    Mixed incontinence 8/10/2016    Obesity, Class I, BMI 30-34.9 8/2/2016    osteoarthritis     Osteoarthrosis, generalized, involving multiple sites     Osteoporosis     Paronychia of finger     Primary insomnia 8/10/2016    Pure hypercholesterolemia 2/9/2017    Sciatica of left side     Scoliosis     Segmental and somatic dysfunction of lumbar region     Segmental and somatic dysfunction of pelvic region     Urinary tract infection     Vertigo     Vitamin B12 deficiency     Vitamin D deficiency     Wound infection          Patient taking anticoagulants yes     ASSESSMENT:    Changes in Assessment Throughout Shift: no     Patient has Central Line: no Reasons if yes: .  Patient has Garcia Cath: no Reasons if yes: .  Last Vitals:     Vitals:    02/14/17 1502 02/14/17 1944 02/15/17 0023 02/15/17 0533   BP: 121/73 126/65 123/68 124/74   Pulse: 63 67 68 70   Resp: 16 16 16 16   Temp: 96.5 °F (35.8 °C) 96.9 °F (36.1 °C) 97.1 °F (36.2 °C) 97.8 °F (36.6 °C)   SpO2: 99% 100% 100% 100%   Weight:       Height:            IV and DRAINS (will only show if present)   Peripheral IV 02/14/17 Right Wrist-Site Assessment: Clean, dry, & intact  Tyrese-Orourke Drain 02/14/17 Right Knee-Site Assessment: Clean, dry, & intact     WOUND (if present)   Wound Type:  surgical   Dressing present  yes   Wound Concerns/Notes:  none     PAIN    Pain Assessment    Pain Intensity 1: 4 (02/15/17 0552)    Pain Location 1: Knee    Pain Intervention(s) 1: Rest    Patient Stated Pain Goal: 8  o Interventions for Pain:  medication  o Intervention effective: yes  o Time of last intervention: See MAR   o Reassessment Completed: yes      Last 3 Weights:  Last 3 Recorded Weights in this Encounter    01/27/17 1320 02/14/17 0559   Weight: 83 kg (183 lb) 80.4 kg (177 lb 3.2 oz)     Weight change:      INTAKE/OUPUT    Current Shift: 02/14 1901 - 02/15 0700  In: 200 [P.O.:200]  Out: 670 [Urine:500; Drains:170]    Last three shifts: 02/13 0701 - 02/14 1900  In: 1980 [P.O.:480; I.V.:1500]  Out: 970 [Urine:800; Drains:120]     LAB RESULTS     Recent Labs      02/15/17   0350   WBC  6.9   HGB  10.3*   HCT  31.3*   PLT  173        Recent Labs      02/15/17   0350   NA  139   K  3.5   GLU  135*   BUN  16   CREA  0.96   CA  8.3*   INR  1.3*       RECOMMENDATIONS AND DISCHARGE PLANNING     Pending tests/procedures/ Plan of Care or Other Needs: PT/OT, pain management  1.  Discharge plan for patient and Needs/Barriers:     2. Estimated Discharge Date: 2-17-17 Posted on Whiteboard in [facility]: yes      4. The patient's care plan was reviewed with the oncoming nurse. \"HEALS\" SAFETY CHECK      Fall Risk    Total Score: 3    Safety Measures: Safety Measures: Bed/Chair-Wheels locked, Bed in low position, Call light within reach, Fall prevention (comment), Gripper socks    A safety check occurred in the patient's room between off going nurse and oncoming nurse listed above. The safety check included the below items  Area Items   H  High Alert Medications - Verify all high alert medication drips (heparin, PCA, etc.)   E  Equipment - Suction is set up for ALL patients (with kaela)  - Red plugs utilized for all equipment (IV pumps, etc.)  - WOWs wiped down at end of shift.  - Room stocked with oxygen, suction, and other unit-specific supplies   A  Alarms - Bed alarm is set for fall risk patients  - Ensure chair alarm is in place and activated if patient is up in a chair   L  Lines - Check IV for any infiltration  - Garcia bag is empty if patient has a Garcia   - Tubing and IV bags are labeled   S  Safety   - Room is clean, patient is clean, and equipment is clean. - Hallways are clear from equipment besides carts. - Fall bracelet on for fall risk patients  - Ensure room is clear and free of clutter  - Suction is set up for ALL patients (with kaela)  - Hallways are clear from equipment besides carts.    - Isolation precautions followed, supplies available outside room, sign posted     Hugo Varela RN

## 2017-02-16 ENCOUNTER — HOME HEALTH ADMISSION (OUTPATIENT)
Dept: HOME HEALTH SERVICES | Facility: HOME HEALTH | Age: 67
End: 2017-02-16
Payer: MEDICARE

## 2017-02-16 ENCOUNTER — APPOINTMENT (OUTPATIENT)
Dept: GENERAL RADIOLOGY | Age: 67
DRG: 470 | End: 2017-02-16
Attending: PHYSICIAN ASSISTANT
Payer: MEDICARE

## 2017-02-16 LAB
ANION GAP BLD CALC-SCNC: 8 MMOL/L (ref 3–18)
APPEARANCE UR: CLEAR
BASOPHILS # BLD AUTO: 0 K/UL (ref 0–0.1)
BASOPHILS # BLD: 0 % (ref 0–2)
BILIRUB UR QL: NEGATIVE
BUN SERPL-MCNC: 18 MG/DL (ref 7–18)
BUN/CREAT SERPL: 18 (ref 12–20)
CALCIUM SERPL-MCNC: 8.5 MG/DL (ref 8.5–10.1)
CHLORIDE SERPL-SCNC: 100 MMOL/L (ref 100–108)
CO2 SERPL-SCNC: 30 MMOL/L (ref 21–32)
COLOR UR: YELLOW
CREAT SERPL-MCNC: 0.98 MG/DL (ref 0.6–1.3)
DIFFERENTIAL METHOD BLD: ABNORMAL
EOSINOPHIL # BLD: 0.1 K/UL (ref 0–0.4)
EOSINOPHIL NFR BLD: 1 % (ref 0–5)
ERYTHROCYTE [DISTWIDTH] IN BLOOD BY AUTOMATED COUNT: 14.3 % (ref 11.6–14.5)
GLUCOSE BLD STRIP.AUTO-MCNC: 135 MG/DL (ref 70–110)
GLUCOSE BLD STRIP.AUTO-MCNC: 141 MG/DL (ref 70–110)
GLUCOSE BLD STRIP.AUTO-MCNC: 158 MG/DL (ref 70–110)
GLUCOSE BLD STRIP.AUTO-MCNC: 180 MG/DL (ref 70–110)
GLUCOSE SERPL-MCNC: 177 MG/DL (ref 74–99)
GLUCOSE UR STRIP.AUTO-MCNC: NEGATIVE MG/DL
HCT VFR BLD AUTO: 31.1 % (ref 35–45)
HGB BLD-MCNC: 10.2 G/DL (ref 12–16)
HGB UR QL STRIP: NEGATIVE
INR PPP: 1.6 (ref 0.8–1.2)
KETONES UR QL STRIP.AUTO: NEGATIVE MG/DL
LEUKOCYTE ESTERASE UR QL STRIP.AUTO: NEGATIVE
LYMPHOCYTES # BLD AUTO: 18 % (ref 21–52)
LYMPHOCYTES # BLD: 1.2 K/UL (ref 0.9–3.6)
MCH RBC QN AUTO: 29.4 PG (ref 24–34)
MCHC RBC AUTO-ENTMCNC: 32.8 G/DL (ref 31–37)
MCV RBC AUTO: 89.6 FL (ref 74–97)
MONOCYTES # BLD: 0.6 K/UL (ref 0.05–1.2)
MONOCYTES NFR BLD AUTO: 9 % (ref 3–10)
NEUTS SEG # BLD: 4.7 K/UL (ref 1.8–8)
NEUTS SEG NFR BLD AUTO: 72 % (ref 40–73)
NITRITE UR QL STRIP.AUTO: NEGATIVE
PH UR STRIP: 5 [PH] (ref 5–8)
PLATELET # BLD AUTO: 166 K/UL (ref 135–420)
PMV BLD AUTO: 11.4 FL (ref 9.2–11.8)
POTASSIUM SERPL-SCNC: 3.8 MMOL/L (ref 3.5–5.5)
PROT UR STRIP-MCNC: NEGATIVE MG/DL
PROTHROMBIN TIME: 18.7 SEC (ref 11.5–15.2)
RBC # BLD AUTO: 3.47 M/UL (ref 4.2–5.3)
SODIUM SERPL-SCNC: 138 MMOL/L (ref 136–145)
SP GR UR REFRACTOMETRY: <1.005 (ref 1–1.03)
UROBILINOGEN UR QL STRIP.AUTO: 0.2 EU/DL (ref 0.2–1)
WBC # BLD AUTO: 6.6 K/UL (ref 4.6–13.2)

## 2017-02-16 PROCEDURE — 97116 GAIT TRAINING THERAPY: CPT

## 2017-02-16 PROCEDURE — 71020 XR CHEST AP LAT: CPT

## 2017-02-16 PROCEDURE — 82962 GLUCOSE BLOOD TEST: CPT

## 2017-02-16 PROCEDURE — 85025 COMPLETE CBC W/AUTO DIFF WBC: CPT | Performed by: ORTHOPAEDIC SURGERY

## 2017-02-16 PROCEDURE — 36415 COLL VENOUS BLD VENIPUNCTURE: CPT | Performed by: ORTHOPAEDIC SURGERY

## 2017-02-16 PROCEDURE — 80048 BASIC METABOLIC PNL TOTAL CA: CPT | Performed by: ORTHOPAEDIC SURGERY

## 2017-02-16 PROCEDURE — 74011636637 HC RX REV CODE- 636/637: Performed by: PHYSICIAN ASSISTANT

## 2017-02-16 PROCEDURE — 85610 PROTHROMBIN TIME: CPT | Performed by: ORTHOPAEDIC SURGERY

## 2017-02-16 PROCEDURE — 81003 URINALYSIS AUTO W/O SCOPE: CPT | Performed by: PHYSICIAN ASSISTANT

## 2017-02-16 PROCEDURE — 74011250637 HC RX REV CODE- 250/637: Performed by: PHYSICIAN ASSISTANT

## 2017-02-16 PROCEDURE — 97110 THERAPEUTIC EXERCISES: CPT

## 2017-02-16 PROCEDURE — 65270000029 HC RM PRIVATE

## 2017-02-16 PROCEDURE — 74011250637 HC RX REV CODE- 250/637: Performed by: ORTHOPAEDIC SURGERY

## 2017-02-16 RX ORDER — WARFARIN SODIUM 5 MG/1
5 TABLET ORAL
Status: COMPLETED | OUTPATIENT
Start: 2017-02-16 | End: 2017-02-16

## 2017-02-16 RX ORDER — DOCUSATE SODIUM 100 MG/1
100 CAPSULE, LIQUID FILLED ORAL DAILY
Status: DISCONTINUED | OUTPATIENT
Start: 2017-02-16 | End: 2017-02-17 | Stop reason: HOSPADM

## 2017-02-16 RX ORDER — POTASSIUM CHLORIDE 20 MEQ/1
20 TABLET, EXTENDED RELEASE ORAL
Status: COMPLETED | OUTPATIENT
Start: 2017-02-16 | End: 2017-02-16

## 2017-02-16 RX ADMIN — POTASSIUM CHLORIDE 20 MEQ: 20 TABLET, EXTENDED RELEASE ORAL at 10:47

## 2017-02-16 RX ADMIN — Medication 10 ML: at 05:00

## 2017-02-16 RX ADMIN — COLCHICINE 0.6 MG: 0.6 TABLET, FILM COATED ORAL at 09:17

## 2017-02-16 RX ADMIN — Medication 325 MG: at 09:22

## 2017-02-16 RX ADMIN — Medication 325 MG: at 16:58

## 2017-02-16 RX ADMIN — METFORMIN HYDROCHLORIDE 500 MG: 500 TABLET, FILM COATED ORAL at 09:22

## 2017-02-16 RX ADMIN — FEBUXOSTAT 40 MG: 40 TABLET ORAL at 09:18

## 2017-02-16 RX ADMIN — PREGABALIN 50 MG: 50 CAPSULE ORAL at 09:23

## 2017-02-16 RX ADMIN — OXYCODONE HYDROCHLORIDE 20 MG: 20 TABLET, FILM COATED, EXTENDED RELEASE ORAL at 09:22

## 2017-02-16 RX ADMIN — INSULIN LISPRO 2 UNITS: 100 INJECTION, SOLUTION INTRAVENOUS; SUBCUTANEOUS at 09:14

## 2017-02-16 RX ADMIN — WARFARIN SODIUM 5 MG: 5 TABLET ORAL at 10:47

## 2017-02-16 RX ADMIN — METOPROLOL SUCCINATE 50 MG: 50 TABLET, EXTENDED RELEASE ORAL at 09:22

## 2017-02-16 RX ADMIN — OXYCODONE HYDROCHLORIDE AND ACETAMINOPHEN 2 TABLET: 7.5; 325 TABLET ORAL at 00:27

## 2017-02-16 RX ADMIN — PANTOPRAZOLE SODIUM 40 MG: 40 TABLET, DELAYED RELEASE ORAL at 09:22

## 2017-02-16 RX ADMIN — HYDROCHLOROTHIAZIDE 12.5 MG: 25 TABLET ORAL at 09:23

## 2017-02-16 RX ADMIN — OXYBUTYNIN CHLORIDE 10 MG: 5 TABLET, FILM COATED, EXTENDED RELEASE ORAL at 09:23

## 2017-02-16 RX ADMIN — PRAVASTATIN SODIUM 20 MG: 20 TABLET ORAL at 21:11

## 2017-02-16 RX ADMIN — OXYCODONE HYDROCHLORIDE AND ACETAMINOPHEN 2 TABLET: 7.5; 325 TABLET ORAL at 04:20

## 2017-02-16 RX ADMIN — DOCUSATE SODIUM 100 MG: 100 CAPSULE, LIQUID FILLED ORAL at 10:47

## 2017-02-16 RX ADMIN — Medication 10 ML: at 14:12

## 2017-02-16 RX ADMIN — OXYCODONE HYDROCHLORIDE 20 MG: 20 TABLET, FILM COATED, EXTENDED RELEASE ORAL at 21:11

## 2017-02-16 RX ADMIN — AMLODIPINE BESYLATE 10 MG: 10 TABLET ORAL at 09:23

## 2017-02-16 RX ADMIN — ACETAMINOPHEN 650 MG: 325 TABLET, FILM COATED ORAL at 16:59

## 2017-02-16 RX ADMIN — OXYCODONE HYDROCHLORIDE AND ACETAMINOPHEN 2 TABLET: 7.5; 325 TABLET ORAL at 21:11

## 2017-02-16 RX ADMIN — INSULIN LISPRO 2 UNITS: 100 INJECTION, SOLUTION INTRAVENOUS; SUBCUTANEOUS at 18:02

## 2017-02-16 RX ADMIN — OXYCODONE HYDROCHLORIDE AND ACETAMINOPHEN 2 TABLET: 7.5; 325 TABLET ORAL at 16:58

## 2017-02-16 RX ADMIN — VALSARTAN 160 MG: 160 TABLET, FILM COATED ORAL at 09:23

## 2017-02-16 RX ADMIN — METFORMIN HYDROCHLORIDE 500 MG: 500 TABLET, FILM COATED ORAL at 16:58

## 2017-02-16 NOTE — PROGRESS NOTES
Name:  Poli Crump  Age:  79 y.o. MRN:  [id number]  CSN:  [id number]  :  1950      2/15/2017  9:30 PM    Anesthesia Peripheral Nerve Block Post-Op Note    Referring physician: Cali Pelletier MD   Patient status post Procedure(s):  RIGHT TOTAL KNEE ARTHROPLASTY on 2017  With  Femoral    POST OP Day # 1    Visit Vitals    /82 (BP 1 Location: Right arm, BP Patient Position: Post activity)    Pulse 67    Temp 36.7 °C (98.1 °F)    Resp 18    Ht 5' 4\" (1.626 m)    Wt 80.4 kg (177 lb 3.2 oz)    SpO2 95%    BMI 30.42 kg/m2       Patient rates pain 2 at rest and 3 with movement. Pain is subjectively rated by patient as mild. Pain location: right knee    No apparent complications, adequate analgesia from peripheral nerve block. Continue current postop pain regimen per protocol.

## 2017-02-16 NOTE — PROGRESS NOTES
Nutrition    Visited patient this morning. Educated pt on coumadin and vitamin K consistent diet. Educational handout provided/ reviewed with her. Answered her questions to her satisfaction. Pt verbalized understanding.     Romel Jeffery, [address]  Pager [phone]

## 2017-02-16 NOTE — PROGRESS NOTES
Bedside and Verbal shift change report given to SINDY Malone (oncoming nurse) by Toby Grimaldo RN (offgoing nurse). Report included the following information SBAR, Kardex, MAR and Recent Results.     SITUATION:    Code Status: No Order   Reason for Admission: Primary osteoarthritis of right knee [address] day: 2   Problem List:       Hospital Problems  Date Reviewed: 2/14/2017          Codes Class Noted POA    Arthritis of knee ICD-10-CM: M19.90  ICD-9-CM: 716.96  2/14/2017 Unknown              BACKGROUND:    Past Medical History:   Past Medical History   Diagnosis Date    Advance directive discussed with patient 8/17/2016    Aspirin intolerance 1/26/2017    Asthma      bronchitis    Cancer ([facility] [address] 1998     Left breast cancer    Cervical radiculopathy     Cigarette nicotine dependence in remission 8/17/2016    DDD (degenerative disc disease), lumbar     Degeneration of cervical intervertebral disc     Degeneration of thoracic intervertebral disc     Diabetes mellitus (HCC)      type 2    Diabetes mellitus type 2, controlled ([facility] [address] 8/2/2016    Essential hypertension with goal blood pressure less than 140/90 8/10/2016    GERD (gastroesophageal reflux disease)     GI bleed 6/29/16    Gout     H. pylori infection 7/11/2016    H/O: GI bleed 1/26/2017    Hammer toe of left foot     Hypercholesterolemia     Hypertension     Lumbago with sciatica     Lumbar radiculopathy     Malignant neoplasm of left female breast ([facility] [address] 8/2/2016    Mixed incontinence 8/10/2016    Obesity, Class I, BMI 30-34.9 8/2/2016    osteoarthritis     Osteoarthrosis, generalized, involving multiple sites     Osteoporosis     Paronychia of finger     Primary insomnia 8/10/2016    Pure hypercholesterolemia 2/9/2017    Sciatica of left side     Scoliosis     Segmental and somatic dysfunction of lumbar region     Segmental and somatic dysfunction of pelvic region     Urinary tract infection     Vertigo     Vitamin B12 deficiency     Vitamin D deficiency     Wound infection          Patient taking anticoagulants yes     ASSESSMENT:    Changes in Assessment Throughout Shift: no     Patient has Central Line: no Reasons if yes: .  Patient has Garcia Cath: no Reasons if yes: .  Last Vitals:     Vitals:    02/15/17 1134 02/15/17 1612 02/15/17 1948 02/16/17 0349   BP: 147/72 131/75 152/82 104/61   Pulse: 72 63 67 74   Resp: 18 18 18 18   Temp: 98.2 °F (36.8 °C) 97.9 °F (36.6 °C) 98.1 °F (36.7 °C) 100 °F (37.8 °C)   SpO2: 97% 100% 95% 100%   Weight:       Height:            IV and DRAINS (will only show if present)   Peripheral IV 02/14/17 Right Wrist-Site Assessment: Clean, dry, & intact  [REMOVED] Tyrese-Orourke Drain 02/14/17 Right Knee-Site Assessment: Clean, dry, & intact     WOUND (if present)   Wound Type:  surgical   Dressing present yes   Wound Concerns/Notes:  none     PAIN    Pain Assessment    Pain Intensity 1: 5 (02/16/17 0507)    Pain Location 1: Knee    Pain Intervention(s) 1: Rest    Patient Stated Pain Goal: 0  o Interventions for Pain:  medication  o Intervention effective: yes  o Time of last intervention: See MAR   o Reassessment Completed: yes      Last 3 Weights:  Last 3 Recorded Weights in this Encounter    01/27/17 1320 02/14/17 0559   Weight: 83 kg (183 lb) 80.4 kg (177 lb 3.2 oz)     Weight change:      INTAKE/OUPUT    Current Shift: 02/15 1901 - 02/16 0700  In: 300 [P.O.:300]  Out: 1000 [Urine:1000]    Last three shifts: 02/14 0701 - 02/15 1900  In: 4000 [P.O.:1400;  I.V.:2600]  Out: 2050 [Urine:1600; Drains:400]     LAB RESULTS     Recent Labs      02/16/17   0252  02/15/17   0350   WBC  6.6  6.9   HGB  10.2*  10.3*   HCT  31.1*  31.3*   PLT  166  173        Recent Labs      02/16/17   0252  02/15/17   0350   NA  138  139   K  3.8  3.5   GLU  177*  135*   BUN  18  16   CREA  0.98  0.96   CA  8.5  8.3*   INR  1.6*  1.3*       RECOMMENDATIONS AND DISCHARGE PLANNING     Pending tests/procedures/ Plan of Care or Other Needs: PT/OT, pain management  1. Discharge plan for patient and Needs/Barriers:     2. Estimated Discharge Date: 2-17-17 Posted on Whiteboard in \[facility] [place] [facility]\"": yes      4. The patient's care plan was reviewed with the oncoming nurse. \"HEALS\" SAFETY CHECK      Fall Risk    Total Score: 3    Safety Measures: Safety Measures: Bed/Chair-Wheels locked, Bed in low position, Call light within reach, Fall prevention (comment), Gripper socks    A safety check occurred in the patient's room between off going nurse and oncoming nurse listed above. The safety check included the below items  Area Items   H  High Alert Medications - Verify all high alert medication drips (heparin, PCA, etc.)   E  Equipment - Suction is set up for ALL patients (with kaela)  - Red plugs utilized for all equipment (IV pumps, etc.)  - WOWs wiped down at end of shift.  - Room stocked with oxygen, suction, and other unit-specific supplies   A  Alarms - Bed alarm is set for fall risk patients  - Ensure chair alarm is in place and activated if patient is up in a chair   L  Lines - Check IV for any infiltration  - Garcia bag is empty if patient has a Garcia   - Tubing and IV bags are labeled   S  Safety   - Room is clean, patient is clean, and equipment is clean. - Hallways are clear from equipment besides carts. - Fall bracelet on for fall risk patients  - Ensure room is clear and free of clutter  - Suction is set up for ALL patients (with kaela)  - Hallways are clear from equipment besides carts.    - Isolation precautions followed, supplies available outside room, sign posted     José Antonio Trejo RN

## 2017-02-16 NOTE — PROGRESS NOTES
Pt states she will be going back home with [facility] and her son will transport her home.

## 2017-02-16 NOTE — PROGRESS NOTES
Pain score:  3 out of 10  Name and time of last pain med given:  Percocet 7.5 0440    Neuro status: intact,no deficit     Dressing/incision status:  Clean dry and intact    IDALIA/Hemovace output:   No drain cc    Voiding status:  Voiding without difficulties    Mobility status:  Up with assistance, one person assist    Other:  Alert and oriented x 4, pedal pulses present and palpable, lungs clear bilaterally, bowel sound active in all quadrants, patient is tolerating regular food well,no complained of nausea or vomiting. Patient denies any acute distress, sob and chest pain. Patient is able to wiggle toes on command, denies any numbness and tingling sensation on both lower extremities. vital WNL, no apparent distress noted, no neurological deficit noted, call bell within reach, will continue to monitor.

## 2017-02-16 NOTE — HOME CARE
Rec HC order - d/c noted for today - ordered RW and BSC from [facility] to be delivered to pt room prior to d/c home today - [facility] will follow per Dr. Kely Xiao knee protocol- family to transport pt home -  -SOCORRO Ray RN          Documented on wrong patient about [facility] unable to take - [facility] will follow this patient for Dr. Kely Xiao protocol to start care tomorrow.

## 2017-02-16 NOTE — PROGRESS NOTES
Problem: Mobility Impaired (Adult and Pediatric)  Goal: *Acute Goals and Plan of Care (Insert Text)  STGs to be addressed within 3 days:  1. Bed mobility: Supine to sit to supine S with HR for meals. 2. Activity tolerance: Tolerate up in chair 1-2 hrs for ADLs. 3. Transfers: Sit to stand to chair S with LRAD for ADLs. LTGs to be addressed within 7 days:  1. Standing/Ambulation Balance: Increase to Good with LRAD for safe transfers and gait. 2. Ambulation: Ambulate > 200 ft. S with LRAD for home mobility. 3. Patient Education: Independent with HEP for home safety. 4. Stairs: Up/Down 2 steps CGA with HR for home entry. Outcome: Progressing Towards Goal  PHYSICAL THERAPY TREATMENT     Patient: Celestino Mcburney (49 y.o. female)  Date: 2/16/2017  Diagnosis: Primary osteoarthritis of right knee [M17.11] <principal problem not specified>  Procedure(s) (LRB):  RIGHT TOTAL KNEE ARTHROPLASTY (Right) 2 Days Post-Op  Precautions: Fall, WBAT  Chart, physical therapy assessment, plan of care and goals were reviewed. ASSESSMENT:  Pt doing well this morning, demonstrating improved activity tolerance with increased distance during gait training with verbal cues for safety and sequencing. ROM 0-90 degrees this morning. Educated pt on safety measures in the home. Progression toward goals:  [X]      Improving appropriately and progressing toward goals  [ ]      Improving slowly and progressing toward goals  [ ]      Not making progress toward goals and plan of care will be adjusted       PLAN:  Patient continues to benefit from skilled intervention to address the above impairments. Continue treatment per established plan of care. Discharge Recommendations:  Home Health  Further Equipment Recommendations for Discharge:  rolling walker       SUBJECTIVE:   Patient stated I Needed that exercise this morning.   PT cleared by  am.  OBJECTIVE DATA SUMMARY:   Critical Behavior:  Neurologic State: Alert  Orientation Level: Oriented X4  Cognition: Appropriate decision making, Appropriate for age attention/concentration, Appropriate safety awareness  Safety/Judgement: Awareness of environment, Fall prevention  Functional Mobility Training:  Bed Mobility:  Rolling: Supervision  Supine to Sit: Supervision  Scooting: Supervision  Transfers:  Sit to Stand: Supervision  Stand to Sit: Modified independent  Balance:  Sitting: Intact  Standing: Impaired  Standing - Static: Good  Standing - Dynamic : Fair  Ambulation/Gait Training:  Distance (ft): 200 Feet (ft)  Assistive Device: Walker, rolling  Ambulation - Level of Assistance: Supervision  Gait Abnormalities: Antalgic;Decreased step clearance  Base of Support: Shift to left  Speed/Nadya: Pace decreased (<100 feet/min)  Stairs:  Number of Stairs Trained: 4  Stairs - Level of Assistance: Supervision  Therapeutic Exercises: Ankle pumps, quad set, glute squeeze, hip abd, heel slides  Pain:  Pt pain was reported as  7 pre-treatment. Pt pain was reported as 8 post-treatment. Intervention: jet stream cooler     Activity Tolerance:   Good   Please refer to the flowsheet for vital signs taken during this treatment.   After treatment:   [X] Patient left in no apparent distress sitting up in chair  [ ] Patient left in no apparent distress in bed  [X] Call bell left within reach  [ ] Nursing notified  [ ] Caregiver present  [ ] Bed alarm activated      Surya Quarles PTA   Time Calculation: 27 mins

## 2017-02-16 NOTE — PROGRESS NOTES
conducted an initial consultation and Spiritual Assessment for Ashley Gusman, who is a 79 y.o.,female. Patients Primary Language is: [place]. According to the patients EMR Orthodoxy Affiliation is: Rachid Watson.     The reason the Patient came to the hospital is:   Patient Active Problem List    Diagnosis Date Noted    Arthritis of knee 02/14/2017    Pure hypercholesterolemia 02/09/2017    H/O: GI bleed 01/26/2017    Aspirin intolerance 01/26/2017    Arthritis 11/14/2016    Advance directive discussed with patient 08/17/2016    Cigarette nicotine dependence in remission 08/17/2016    Mixed incontinence 08/10/2016    Primary insomnia 08/10/2016    Essential hypertension with goal blood pressure less than 140/90 08/10/2016    Malignant neoplasm of left female breast ([facility] [address] 08/02/2016    Diabetes mellitus type 2, controlled ([facility] [address] 08/02/2016    Obesity, Class I, BMI 30-34.9 08/02/2016    DDD (degenerative disc disease), lumbar     Scoliosis     Gout         The  provided the following Interventions:  Initiated a relationship of care and support. Explored issues of natasha, belief, spirituality and Worship/ritual needs while hospitalized. Listened empathically. Provided chaplaincy education. Provided information about Spiritual Care Services. Offered prayer and assurance of continued prayers on patient's behalf. Chart reviewed. The following outcomes where achieved:  Patient shared limited information about both their medical narrative and spiritual journey/beliefs.  confirmed Patient's Orthodoxy Affiliation. Patient processed feeling about current hospitalization. Patient expressed gratitude for 's visit. Assessment:  Patient does not have any Worship/cultural needs that will affect patients preferences in health care. There are no spiritual or Worship issues which require intervention at this time.      Plan:  Chaplains will continue to follow and will provide pastoral care on an as needed/requested basis.  recommends bedside caregivers page  on duty if patient shows signs of acute spiritual or emotional distress. Chaplain Ines MUNOZ  [address] [facility]  [phone]

## 2017-02-16 NOTE — HOME CARE
Call to [facility] - spoke with [employer] - information has been rec, but they need a credit card on file to get dme delivered - she will call son and speak with him regarding this - unable to give me an estimate of delivery time - they need 24 hour advance call to get dme - awaiting call back of delivery time - D Flor CALLAHAN

## 2017-02-16 NOTE — PROGRESS NOTES
Ortho    Pt. Seen and evaluated. Doing well, pain well controlled, progressed well with PT  Denies cp, sob, abd pain    Blood pressure 137/83, pulse 71, temperature 98.9 °F (37.2 °C), resp. rate 16, height 5' 4\" (1.626 m), weight 177 lb 3.2 oz (80.4 kg), SpO2 95 %.    rightKnee woundclean, dry, no drainage  Sensory intact to LT  Motor intact  nv intact  Neg calf tenderness    Labs:  CBC  @  CBC:   Lab Results   Component Value Date/Time    WBC 6.6 02/16/2017 02:52 AM    RBC 3.47 02/16/2017 02:52 AM    HGB 10.2 02/16/2017 02:52 AM    HCT 31.1 02/16/2017 02:52 AM    PLATELET 507 71/92/2380 02:52 AM     BMP:   Lab Results   Component Value Date/Time    Glucose 177 02/16/2017 02:52 AM    Sodium 138 02/16/2017 02:52 AM    Potassium 3.8 02/16/2017 02:52 AM    Chloride 100 02/16/2017 02:52 AM    CO2 30 02/16/2017 02:52 AM    BUN 18 02/16/2017 02:52 AM    Creatinine 0.98 02/16/2017 02:52 AM    Calcium 8.5 02/16/2017 02:52 AM   @  Coagulation  Lab Results   Component Value Date    INR 1.6 (H) 02/16/2017    APTT 26.9 02/08/2017      Basic Metabolic Profile  Lab Results   Component Value Date     02/16/2017    CO2 30 02/16/2017    BUN 18 02/16/2017       Assesment: right Orthopedic / Rheumatologic: Total Knee Replacement    Plan: coumadin, PT, home today

## 2017-02-16 NOTE — PROGRESS NOTES
Bedside and Verbal shift change report given to [address] [place] [address] (oncoming nurse) by Miguelito Vernon RN (offgoing nurse). Report included the following information SBAR, Kardex, MAR and Recent Results.     SITUATION:    Code Status: No Order   Reason for Admission: Primary osteoarthritis of right knee [address] day: 1   Problem List:       Hospital Problems  Date Reviewed: 2/14/2017          Codes Class Noted POA    Arthritis of knee ICD-10-CM: M19.90  ICD-9-CM: 716.96  2/14/2017 Unknown              BACKGROUND:    Past Medical History:   Past Medical History   Diagnosis Date    Advance directive discussed with patient 8/17/2016    Aspirin intolerance 1/26/2017    Asthma      bronchitis    Cancer ([facility] [address] 1998     Left breast cancer    Cervical radiculopathy     Cigarette nicotine dependence in remission 8/17/2016    DDD (degenerative disc disease), lumbar     Degeneration of cervical intervertebral disc     Degeneration of thoracic intervertebral disc     Diabetes mellitus (HCC)      type 2    Diabetes mellitus type 2, controlled ([facility] [address] 8/2/2016    Essential hypertension with goal blood pressure less than 140/90 8/10/2016    GERD (gastroesophageal reflux disease)     GI bleed 6/29/16    Gout     H. pylori infection 7/11/2016    H/O: GI bleed 1/26/2017    Hammer toe of left foot     Hypercholesterolemia     Hypertension     Lumbago with sciatica     Lumbar radiculopathy     Malignant neoplasm of left female breast ([facility] [address] 8/2/2016    Mixed incontinence 8/10/2016    Obesity, Class I, BMI 30-34.9 8/2/2016    osteoarthritis     Osteoarthrosis, generalized, involving multiple sites     Osteoporosis     Paronychia of finger     Primary insomnia 8/10/2016    Pure hypercholesterolemia 2/9/2017    Sciatica of left side     Scoliosis     Segmental and somatic dysfunction of lumbar region     Segmental and somatic dysfunction of pelvic region     Urinary tract infection     Vertigo     Vitamin B12 deficiency     Vitamin D deficiency     Wound infection          Patient taking anticoagulants yes     ASSESSMENT:    Changes in Assessment Throughout Shift: NO     Patient has Central Line: no Reasons if yes: NO   Patient has Garcia Cath: no Reasons if yes: NO      Last Vitals:     Vitals:    02/15/17 0900 02/15/17 1134 02/15/17 1612 02/15/17 1948   BP: 141/70 147/72 131/75 152/82   Pulse: 67 72 63 67   Resp:  18 18 18   Temp:  98.2 °F (36.8 °C) 97.9 °F (36.6 °C) 98.1 °F (36.7 °C)   SpO2:  97% 100% 95%   Weight:       Height:            IV and DRAINS (will only show if present)   Peripheral IV 02/14/17 Right Wrist-Site Assessment: Clean, dry, & intact  [REMOVED] Tyrese-Orourke Drain 02/14/17 Right Knee-Site Assessment: Clean, dry, & intact     WOUND (if present)   Wound Type:  none, SURGICAL WOUND   Dressing present     Wound Concerns/Notes:  none     PAIN    Pain Assessment    Pain Intensity 1: 3 (02/15/17 1940)    Pain Location 1: Knee    Pain Intervention(s) 1: Rest, Repositioned    Patient Stated Pain Goal: 8  o Interventions for Pain:  none, MEDS  o Intervention effective: yes  o Time of last intervention: SEE MAR   o Reassessment Completed: yes      Last 3 Weights:  Last 3 Recorded Weights in this Encounter    01/27/17 1320 02/14/17 0559   Weight: 83 kg (183 lb) 80.4 kg (177 lb 3.2 oz)     Weight change:      INTAKE/OUPUT    Current Shift: 02/15 1901 - 02/16 0700  In: -   Out: 300 [Urine:300]    Last three shifts: 02/14 0701 - 02/15 1900  In: 4000 [P.O.:1400; I.V.:2600]  Out: 2050 [Urine:1600; Drains:400]     LAB RESULTS     Recent Labs      02/15/17   0350   WBC  6.9   HGB  10.3*   HCT  31.3*   PLT  173        Recent Labs      02/15/17   0350   NA  139   K  3.5   GLU  135*   BUN  16   CREA  0.96   CA  8.3*   INR  1.3*       RECOMMENDATIONS AND DISCHARGE PLANNING     1. Pending tests/procedures/ Plan of Care or Other Needs: LABS     2.  Discharge plan for patient and Needs/Barriers: HOME [address]    3. Estimated Discharge Date: 2/16/17 Posted on Whiteboard in [facility]: yes      4. The patient's care plan was reviewed with the oncoming nurse. \"HEALS\" SAFETY CHECK      Fall Risk    Total Score: 3    Safety Measures: Safety Measures: Bed/Chair-Wheels locked, Bed in low position, Call light within reach, Fall prevention (comment), Gripper socks    A safety check occurred in the patient's room between off going nurse and oncoming nurse listed above. The safety check included the below items  Area Items   H  High Alert Medications - Verify all high alert medication drips (heparin, PCA, etc.)   E  Equipment - Suction is set up for ALL patients (with kaela)  - Red plugs utilized for all equipment (IV pumps, etc.)  - WOWs wiped down at end of shift.  - Room stocked with oxygen, suction, and other unit-specific supplies   A  Alarms - Bed alarm is set for fall risk patients  - Ensure chair alarm is in place and activated if patient is up in a chair   L  Lines - Check IV for any infiltration  - Garcia bag is empty if patient has a Garcia   - Tubing and IV bags are labeled   S  Safety   - Room is clean, patient is clean, and equipment is clean. - Hallways are clear from equipment besides carts. - Fall bracelet on for fall risk patients  - Ensure room is clear and free of clutter  - Suction is set up for ALL patients (with kaela)  - Hallways are clear from equipment besides carts.    - Isolation precautions followed, supplies available outside room, sign posted     Kira Serrano RN

## 2017-02-16 NOTE — DIABETES MGMT
Glycemic Control Plan of Care    Assessment/Recommendations:  Patient is 79year old with past medical history including type 2 diabetes mellitus, left breast cancer, hypertension, and hypercholesterolemia - was admitted on 02/14/2017 for elective ortho procedure. Noted:  Primary osteoarthritis of right knee status post right total knee arthroplasty on 02/14/2017. Type 2 diabetes mellitus with current A1C of 6.7% (02/14/2017). POC BG range on 02/15/2017: 111-186 mg/dL. POC BG report on 02/16/2017 at time of review: 158 mg/dL. Patient received 2 units of correctional lispro insulin (normal sensitivity dose) on 02/15/2017, and resumed Metformin 1000 mg twice daily (home dose). She voiced no concerns or issues. Recommendation: Continue monitoring and correctional insulin. Most recent blood glucose values:    Results for Jose Gray (MRN [id number]) as of 2/16/2017 09:46   Ref. Range 2/15/2017 06:28 2/15/2017 08:14 2/15/2017 11:38 2/15/2017 16:11 2/15/2017 22:09   GLUCOSE,FAST - POC Latest Ref Range: 70 - 110 mg/dL 112 (H) 111 (H) 186 (H) 125 (H) 137 (H)     Results for Jose Gray (MRN [id number]) as of 2/16/2017 09:46   Ref. Range 2/16/2017 05:06   GLUCOSE,FAST - POC Latest Ref Range: 70 - 110 mg/dL 158 (H)     Current A1C: 6.7% (02/14/2017) is equivalent to average blood glucose of 146 mg/dL during the past 2-3 months. Current hospital diabetes medications:  Correctional lispro insulin 4x daily (before meals and bedtime). Normal sensitivity dose. Metformin 1000 mg twice daily with meals. Total daily dose insulin requirement previous day:  02/15/2017  Lispro: 2 units    Home diabetes medications: As stated by patient on 02/15/2017:  Metformin 500 mg twice daily with meals.     Diet:  Diabetic consistent carb regular    Goals:  Blood glucose will be within target range of  mg/dL by 02/19/2017    Education:  _X__  Refer to Diabetes Education Record: 02/15/2017             ___  Education not indicated at this time    Zenon Elias,

## 2017-02-16 NOTE — PROGRESS NOTES
1940  Received pt in stable condition,   General: lying in bed in supine, not apparent distress, 0 pain, voiced no compliants at this time. Neuro: AOx4, denies numbness, 0 tingling, able to wiggle toes to bilateral extremities  Cardio: pedal pulses palpable, denies chest pain  Respiratory: denies shortness of breath  Skin: Dressing to right knee clean, dry and intact, polar ice in place  GI: voiding  : denies nausea and vomiting  Mus: WBAT to E  Bed in low position and call bell with in reach     0424  Patient AOx4, no apparent disstress, dressing to right knee clean, dry and intact. Bilateral lower extremities: pedal pulses present and palpable, denies numbness, able to wiggle toes. No changes in status, in stable condition.

## 2017-02-16 NOTE — PROGRESS NOTES
[facility]  Pulmonary, Critical Care, and Sleep Medicine    Name: Narda Astorga MRN: [id number]   : 1950 Hospital: [address] [place] [address] Dr   Date: 2017        IMPRESSION:   · End Stage OA R knee, failed conservative therapy - S/P TKR - R knee  · Asthma - Takes Allegra and Combi vent inhaler at home. · Prior Smoker (quit about 41 yrs ago)  · DM II - Not well controlled. On Metformin. HgBA1C (16) - 8.4  · HTN - Goal BP <140/90; BP is at goal w/ Norvasc, Toprol and Valsartan   · Hx of GI Bleed  · Hypercholesterolemia  · OA  · Osteoporosis  · Obesity  · Vitamin B12 Deficiency  · Vitamin D Deficiency  · Vertigo  · Hx of UTI - Recently completed course of ABX prior to surgery. · Hx of Gout - Not well controlled. On Colchicine & Uloric      RECOMMENDATIONS:   · Titrate O2 to RA as tolerated - Pt currently resting comfortably on RA. Duo-nebs q6 PRN. Aggressive pulmonary toileting. Encourage incentive spirometry, PT/OT eval & treat, mobilization. · Monitor hemodynamics; PT/INR. · Replacing K+ this AM, repeat lab this afternoon. · Diabetic diet. Glycemic Control - SSI. B.S Goal <140. Monitor B/S - POC Glucose today - 158. POC glucose checks AC&HS. Restarted  yesterday. · No BM yet since surgery, added Colace daily. · Pain management per Ortho. · DVT, PUD prophylaxis - Management per Ortho. · Home Meds: Norvasc, Metoprolol, Prevastatin, Diovan, HCTZ. Metformin, Uloric and Colchicine    Will continue to follow from medical point of view    Events and notes from last 24 hours reviewed. Care Time: >35 min. · Prior/Old records reviewed and discussed with patient. · Labs, Images and available PFT and sleep study discussed with patient. Labs and images personally seen and available reports reviewed with patient. · All current medicines are reviewed and doses and prescription adjusted.   · Further management depending on test results and work up as outlined above.      Subjective/Interval History:     Pt resting comfortably in reclining chair; appears comfortable on RA. Denies any pain or complaint. Denies H/A, KIM, CP, palpitations, abd pain, N/V/D, fever/chills, urinary symptoms. Pt has not had a BM since surgery yet, but is eating now and is in no distress. ROS:A comprehensive review of systems was negative. Objective:   Vital Signs:    Visit Vitals    /61 (BP 1 Location: Right arm, BP Patient Position: At rest)    Pulse 74    Temp 100 °F (37.8 °C)    Resp 18    Ht 5' 4\" (1.626 m)    Wt 80.4 kg (177 lb 3.2 oz)    SpO2 100%    BMI 30.42 kg/m2       O2 Device: Nasal cannula   O2 Flow Rate (L/min): 2 l/min   Temp (24hrs), Av.2 °F (36.8 °C), Min:96.7 °F (35.9 °C), Max:100 °F (37.8 °C)       Intake/Output:   Last shift:         Last 3 shifts:  1901 -  0700  In: 3899 [P.O.:1220; I.V.:1100]  Out: 2080 [Urine:1800; Drains:280]    Intake/Output Summary (Last 24 hours) at 17 0811  Last data filed at 17 0425   Gross per 24 hour   Intake             1020 ml   Output             1020 ml   Net                0 ml        Physical Exam:    General: Resting comfortably on RA, NAD,appears stated age. HEENT: pupils reactive, sclera anicteric, EOM intact   Neck: No adenopathy or thyroid swelling, no lymphadenopathy or JVD, supple   CVS: RRR, S1S2 normal; No m/r/g   RS: Symmetrical chest rise; CTAB; No wheezes/rhonchi/rales noted. Abd: soft, non tender; +B.S x4; No hepatosplenomegaly   Neuro: non focal, awake, alert   Lymph node: No obvious palpable lymph node appreciated. Skin: No rash   Extremities: Normal, atraumatic, no cyanosis or edema. Dressings examined. right leg Hemovac    Prophylaxis- DVT: appropriate       Surgical site: appropiate        DATA:  Labs:  Recent Labs      17   0252  02/15/17   0350   WBC  6.6  6.9   HGB  10.2*  10.3*   HCT  31.1*  31.3*   PLT  166  173     Recent Labs      17   0252  02/15/17 0350   NA  138  139   K  3.8  3.5   CL  100  102   CO2  30  28   GLU  177*  135*   BUN  18  16   CREA  0.98  0.96   CA  8.5  8.3*   INR  1.6*  1.3*     No results for input(s): PH, PCO2, PO2, HCO3, FIO2 in the last 72 hours. Imaging: No new imaging 02/15/17.       Marc Morales PA-C

## 2017-02-17 VITALS
HEIGHT: 64 IN | WEIGHT: 177.2 LBS | SYSTOLIC BLOOD PRESSURE: 158 MMHG | OXYGEN SATURATION: 98 % | BODY MASS INDEX: 30.25 KG/M2 | RESPIRATION RATE: 16 BRPM | DIASTOLIC BLOOD PRESSURE: 81 MMHG | HEART RATE: 77 BPM | TEMPERATURE: 98.1 F

## 2017-02-17 LAB
ANION GAP BLD CALC-SCNC: 7 MMOL/L (ref 3–18)
BASOPHILS # BLD AUTO: 0 K/UL (ref 0–0.06)
BASOPHILS # BLD: 0 % (ref 0–2)
BUN SERPL-MCNC: 13 MG/DL (ref 7–18)
BUN/CREAT SERPL: 15 (ref 12–20)
CALCIUM SERPL-MCNC: 8.5 MG/DL (ref 8.5–10.1)
CHLORIDE SERPL-SCNC: 101 MMOL/L (ref 100–108)
CO2 SERPL-SCNC: 30 MMOL/L (ref 21–32)
CREAT SERPL-MCNC: 0.89 MG/DL (ref 0.6–1.3)
DIFFERENTIAL METHOD BLD: ABNORMAL
EOSINOPHIL # BLD: 0.1 K/UL (ref 0–0.4)
EOSINOPHIL NFR BLD: 1 % (ref 0–5)
ERYTHROCYTE [DISTWIDTH] IN BLOOD BY AUTOMATED COUNT: 14.5 % (ref 11.6–14.5)
GLUCOSE BLD STRIP.AUTO-MCNC: 138 MG/DL (ref 70–110)
GLUCOSE BLD STRIP.AUTO-MCNC: 162 MG/DL (ref 70–110)
GLUCOSE SERPL-MCNC: 147 MG/DL (ref 74–99)
HCT VFR BLD AUTO: 29.4 % (ref 35–45)
HGB BLD-MCNC: 9.5 G/DL (ref 12–16)
INR PPP: 2.2 (ref 0.8–1.2)
LYMPHOCYTES # BLD AUTO: 24 % (ref 21–52)
LYMPHOCYTES # BLD: 1.7 K/UL (ref 0.9–3.6)
MCH RBC QN AUTO: 29 PG (ref 24–34)
MCHC RBC AUTO-ENTMCNC: 32.3 G/DL (ref 31–37)
MCV RBC AUTO: 89.6 FL (ref 74–97)
MONOCYTES # BLD: 1.2 K/UL (ref 0.05–1.2)
MONOCYTES NFR BLD AUTO: 16 % (ref 3–10)
NEUTS SEG # BLD: 4.1 K/UL (ref 1.8–8)
NEUTS SEG NFR BLD AUTO: 59 % (ref 40–73)
PLATELET # BLD AUTO: 154 K/UL (ref 135–420)
PMV BLD AUTO: 10.9 FL (ref 9.2–11.8)
POTASSIUM SERPL-SCNC: 3.7 MMOL/L (ref 3.5–5.5)
PROTHROMBIN TIME: 23 SEC (ref 11.5–15.2)
RBC # BLD AUTO: 3.28 M/UL (ref 4.2–5.3)
SODIUM SERPL-SCNC: 138 MMOL/L (ref 136–145)
WBC # BLD AUTO: 7.1 K/UL (ref 4.6–13.2)

## 2017-02-17 PROCEDURE — 97116 GAIT TRAINING THERAPY: CPT

## 2017-02-17 PROCEDURE — 82962 GLUCOSE BLOOD TEST: CPT

## 2017-02-17 PROCEDURE — 74011250637 HC RX REV CODE- 250/637: Performed by: PHYSICIAN ASSISTANT

## 2017-02-17 PROCEDURE — 36415 COLL VENOUS BLD VENIPUNCTURE: CPT | Performed by: ORTHOPAEDIC SURGERY

## 2017-02-17 PROCEDURE — 85025 COMPLETE CBC W/AUTO DIFF WBC: CPT | Performed by: ORTHOPAEDIC SURGERY

## 2017-02-17 PROCEDURE — 74011250637 HC RX REV CODE- 250/637: Performed by: ORTHOPAEDIC SURGERY

## 2017-02-17 PROCEDURE — 80048 BASIC METABOLIC PNL TOTAL CA: CPT | Performed by: ORTHOPAEDIC SURGERY

## 2017-02-17 PROCEDURE — 77030027138 HC INCENT SPIROMETER -A

## 2017-02-17 PROCEDURE — 97110 THERAPEUTIC EXERCISES: CPT

## 2017-02-17 PROCEDURE — 85610 PROTHROMBIN TIME: CPT | Performed by: ORTHOPAEDIC SURGERY

## 2017-02-17 RX ORDER — POTASSIUM CHLORIDE 20 MEQ/1
20 TABLET, EXTENDED RELEASE ORAL
Status: COMPLETED | OUTPATIENT
Start: 2017-02-17 | End: 2017-02-17

## 2017-02-17 RX ORDER — WARFARIN 2 MG/1
2 TABLET ORAL
Status: COMPLETED | OUTPATIENT
Start: 2017-02-17 | End: 2017-02-17

## 2017-02-17 RX ADMIN — OXYBUTYNIN CHLORIDE 10 MG: 5 TABLET, FILM COATED, EXTENDED RELEASE ORAL at 08:49

## 2017-02-17 RX ADMIN — COLCHICINE 0.6 MG: 0.6 TABLET, FILM COATED ORAL at 08:51

## 2017-02-17 RX ADMIN — OXYCODONE HYDROCHLORIDE 20 MG: 20 TABLET, FILM COATED, EXTENDED RELEASE ORAL at 08:50

## 2017-02-17 RX ADMIN — PREGABALIN 50 MG: 50 CAPSULE ORAL at 08:53

## 2017-02-17 RX ADMIN — WARFARIN SODIUM 2 MG: 2 TABLET ORAL at 09:48

## 2017-02-17 RX ADMIN — OXYCODONE HYDROCHLORIDE AND ACETAMINOPHEN 2 TABLET: 7.5; 325 TABLET ORAL at 06:32

## 2017-02-17 RX ADMIN — Medication 10 ML: at 06:00

## 2017-02-17 RX ADMIN — DOCUSATE SODIUM 100 MG: 100 CAPSULE, LIQUID FILLED ORAL at 08:50

## 2017-02-17 RX ADMIN — Medication 325 MG: at 08:48

## 2017-02-17 RX ADMIN — AMLODIPINE BESYLATE 10 MG: 10 TABLET ORAL at 08:50

## 2017-02-17 RX ADMIN — POTASSIUM CHLORIDE 20 MEQ: 20 TABLET, EXTENDED RELEASE ORAL at 09:48

## 2017-02-17 RX ADMIN — VALSARTAN 160 MG: 160 TABLET, FILM COATED ORAL at 08:53

## 2017-02-17 RX ADMIN — METOPROLOL SUCCINATE 50 MG: 50 TABLET, EXTENDED RELEASE ORAL at 08:50

## 2017-02-17 RX ADMIN — HYDROCHLOROTHIAZIDE 12.5 MG: 25 TABLET ORAL at 08:51

## 2017-02-17 RX ADMIN — PANTOPRAZOLE SODIUM 40 MG: 40 TABLET, DELAYED RELEASE ORAL at 06:32

## 2017-02-17 RX ADMIN — METFORMIN HYDROCHLORIDE 500 MG: 500 TABLET, FILM COATED ORAL at 08:53

## 2017-02-17 RX ADMIN — Medication 10 ML: at 14:00

## 2017-02-17 RX ADMIN — FEBUXOSTAT 40 MG: 40 TABLET ORAL at 08:48

## 2017-02-17 NOTE — PROGRESS NOTES
Ortho    Pt. Seen and evaluated. Doing well, pain well controlled, progressing well with PT  Denies cp, sob, abd pain    Blood pressure 159/79, pulse 69, temperature 98.5 °F (36.9 °C), resp. rate 18, height 5' 4\" (1.626 m), weight 177 lb 3.2 oz (80.4 kg), SpO2 95 %. rightKnee woundclean, dry, no drainage  Sensory intact to LT  Motor intact  nv intact  Neg calf tenderness    Labs:  CBC  @  CBC:   Lab Results   Component Value Date/Time    WBC 7.1 02/17/2017 02:36 AM    RBC 3.28 02/17/2017 02:36 AM    HGB 9.5 02/17/2017 02:36 AM    HCT 29.4 02/17/2017 02:36 AM    PLATELET 487 23/82/0143 02:36 AM     BMP:   Lab Results   Component Value Date/Time    Glucose 147 02/17/2017 02:36 AM    Sodium 138 02/17/2017 02:36 AM    Potassium 3.7 02/17/2017 02:36 AM    Chloride 101 02/17/2017 02:36 AM    CO2 30 02/17/2017 02:36 AM    BUN 13 02/17/2017 02:36 AM    Creatinine 0.89 02/17/2017 02:36 AM    Calcium 8.5 02/17/2017 02:36 AM   @  Coagulation  Lab Results   Component Value Date    INR 2.2 (H) 02/17/2017    APTT 26.9 02/08/2017      Basic Metabolic Profile  Lab Results   Component Value Date     02/17/2017    CO2 30 02/17/2017    BUN 13 02/17/2017     ua neg  cxr- neg    Assesment: right Orthopedic / Rheumatologic: Total Knee Replacement    Plan: coumadin, PT, social work consult- snf placement.

## 2017-02-17 NOTE — PROGRESS NOTES
Bedside and Verbal shift change report given to [address] [place] [address] (oncoming nurse) by Mukesh Peterson RN (offgoing nurse). Report included the following information SBAR, Kardex, MAR and Recent Results.     SITUATION:    Code Status: No Order   Reason for Admission: Primary osteoarthritis of right knee [address] day: 2   Problem List:       Hospital Problems  Date Reviewed: 2/14/2017          Codes Class Noted POA    Arthritis of knee ICD-10-CM: M19.90  ICD-9-CM: 716.96  2/14/2017 Unknown              BACKGROUND:    Past Medical History:   Past Medical History   Diagnosis Date    Advance directive discussed with patient 8/17/2016    Aspirin intolerance 1/26/2017    Asthma      bronchitis    Cancer ([facility] 1998     Left breast cancer    Cervical radiculopathy     Cigarette nicotine dependence in remission 8/17/2016    DDD (degenerative disc disease), lumbar     Degeneration of cervical intervertebral disc     Degeneration of thoracic intervertebral disc     Diabetes mellitus (HCC)      type 2    Diabetes mellitus type 2, controlled ([facility] 8/2/2016    Essential hypertension with goal blood pressure less than 140/90 8/10/2016    GERD (gastroesophageal reflux disease)     GI bleed 6/29/16    Gout     H. pylori infection 7/11/2016    H/O: GI bleed 1/26/2017    Hammer toe of left foot     Hypercholesterolemia     Hypertension     Lumbago with sciatica     Lumbar radiculopathy     Malignant neoplasm of left female breast ([facility] 8/2/2016    Mixed incontinence 8/10/2016    Obesity, Class I, BMI 30-34.9 8/2/2016    osteoarthritis     Osteoarthrosis, generalized, involving multiple sites     Osteoporosis     Paronychia of finger     Primary insomnia 8/10/2016    Pure hypercholesterolemia 2/9/2017    Sciatica of left side     Scoliosis     Segmental and somatic dysfunction of lumbar region     Segmental and somatic dysfunction of pelvic region     Urinary tract infection     Vertigo     Vitamin B12 deficiency     Vitamin D deficiency     Wound infection          Patient taking anticoagulants yes     ASSESSMENT:    Changes in Assessment Throughout Shift: YES, FEVER     Patient has Central Line: no Reasons if yes: NO   Patient has Garcia Cath: no Reasons if yes: NO      Last Vitals:     Vitals:    02/16/17 1115 02/16/17 1608 02/16/17 1807 02/16/17 1902   BP: 137/83 188/90 136/72 124/68   Pulse: 71 84 72 68   Resp: 16 18 18   Temp: 98.9 °F (37.2 °C) (!) 101 °F (38.3 °C)  100.3 °F (37.9 °C)   SpO2: 95% 97%  97%   Weight:       Height:            IV and DRAINS (will only show if present)   [REMOVED] Peripheral IV 02/14/17 Right Wrist-Site Assessment: Clean, dry, & intact  [REMOVED] Tyrese-Orourke Drain 02/14/17 Right Knee-Site Assessment: Clean, dry, & intact     WOUND (if present)   Wound Type:  none, surgical incision   Dressing present Dressing Present : No   Wound Concerns/Notes:  none     PAIN    Pain Assessment    Pain Intensity 1: 0 (02/16/17 1807)    Pain Location 1: Knee    Pain Intervention(s) 1: Rest    Patient Stated Pain Goal: 0  o Interventions for Pain:  none, meds  o Intervention effective: yes  o Time of last intervention: see mar   o Reassessment Completed: yes      Last 3 Weights:  Last 3 Recorded Weights in this Encounter    01/27/17 1320 02/14/17 0559   Weight: 83 kg (183 lb) 80.4 kg (177 lb 3.2 oz)     Weight change:      INTAKE/OUPUT    Current Shift:      Last three shifts: 02/15 0701 - 02/16 1900  In: 1580 [P.O.:1580]  Out: 2990 [Urine:2970; Drains:20]     LAB RESULTS     Recent Labs      02/16/17   0252  02/15/17   0350   WBC  6.6  6.9   HGB  10.2*  10.3*   HCT  31.1*  31.3*   PLT  166  173        Recent Labs      02/16/17   0252  02/15/17   0350   NA  138  139   K  3.8  3.5   GLU  177*  135*   BUN  18  16   CREA  0.98  0.96   CA  8.5  8.3*   INR  1.6*  1.3*       RECOMMENDATIONS AND DISCHARGE PLANNING     1.  Pending tests/procedures/ Plan of Care or Other Needs: labs, X-RAY     2. Discharge plan for patient and Needs/Barriers: [address]    3. Estimated Discharge Date: 2/17/17 Posted on Whiteboard in [facility]: yes      4. The patient's care plan was reviewed with the oncoming nurse. \"HEALS\" SAFETY CHECK      Fall Risk    Total Score: 3    Safety Measures: Safety Measures: Bed in low position, Call light within reach, Bed/Chair-Wheels locked, Fall prevention (comment), Gripper socks    A safety check occurred in the patient's room between off going nurse and oncoming nurse listed above. The safety check included the below items  Area Items   H  High Alert Medications - Verify all high alert medication drips (heparin, PCA, etc.)   E  Equipment - Suction is set up for ALL patients (with kaela)  - Red plugs utilized for all equipment (IV pumps, etc.)  - WOWs wiped down at end of shift.  - Room stocked with oxygen, suction, and other unit-specific supplies   A  Alarms - Bed alarm is set for fall risk patients  - Ensure chair alarm is in place and activated if patient is up in a chair   L  Lines - Check IV for any infiltration  - Garcia bag is empty if patient has a Garcia   - Tubing and IV bags are labeled   S  Safety   - Room is clean, patient is clean, and equipment is clean. - Hallways are clear from equipment besides carts. - Fall bracelet on for fall risk patients  - Ensure room is clear and free of clutter  - Suction is set up for ALL patients (with kaela)  - Hallways are clear from equipment besides carts.    - Isolation precautions followed, supplies available outside room, sign posted     Kira Ruggiero RN

## 2017-02-17 NOTE — PROGRESS NOTES
Problem: Mobility Impaired (Adult and Pediatric)  Goal: *Acute Goals and Plan of Care (Insert Text)  STGs to be addressed within 3 days:  1. Bed mobility: Supine to sit to supine S with HR for meals. 2. Activity tolerance: Tolerate up in chair 1-2 hrs for ADLs. 3. Transfers: Sit to stand to chair S with LRAD for ADLs. LTGs to be addressed within 7 days:  1. Standing/Ambulation Balance: Increase to Good with LRAD for safe transfers and gait. 2. Ambulation: Ambulate > 200 ft. S with LRAD for home mobility. 3. Patient Education: Independent with HEP for home safety. 4. Stairs: Up/Down 2 steps CGA with HR for home entry. Outcome: Progressing Towards Goal  PHYSICAL THERAPY TREATMENT     Patient: Lew Gu (64 y.o. female)  Date: 2/17/2017  Diagnosis: Primary osteoarthritis of right knee [M17.11] <principal problem not specified>  Procedure(s) (LRB):  RIGHT TOTAL KNEE ARTHROPLASTY (Right) 3 Days Post-Op  Precautions: Fall, WBAT  Chart, physical therapy assessment, plan of care and goals were reviewed. ASSESSMENT:  Pt continues to progress towards goals with improved pacing and step length today, able to navigate 4 stairs using proper safety techniques and sequencing. Educated pt on safety measures in the home. Progression toward goals:  [X]      Improving appropriately and progressing toward goals  [ ]      Improving slowly and progressing toward goals  [ ]      Not making progress toward goals and plan of care will be adjusted       PLAN:  Patient continues to benefit from skilled intervention to address the above impairments. Continue treatment per established plan of care. Discharge Recommendations:  Home Health  Further Equipment Recommendations for Discharge:  rolling walker       SUBJECTIVE:   Patient stated It feels good to get up and move around.       OBJECTIVE DATA SUMMARY:   Critical Behavior:  Neurologic State: Alert  Orientation Level: Oriented X4  Cognition: Appropriate decision making  Safety/Judgement: Awareness of environment, Fall prevention  Functional Mobility Training:  Transfers:  Sit to Stand: Modified independent  Stand to Sit: Modified independent  Balance:  Sitting: Intact  Standing: Impaired  Standing - Static: Good  Standing - Dynamic : Fair  Ambulation/Gait Training:  Distance (ft): 400 Feet (ft)  Assistive Device: Walker, rolling  Ambulation - Level of Assistance: Modified independent  Gait Abnormalities: Decreased step clearance  Base of Support: Center of gravity altered  Speed/Nadya: Fluctuations  Stairs:  Number of Stairs Trained: 4  Stairs - Level of Assistance: Supervision  Therapeutic Exercises: Ankle pumps, quad set, glute squeeze, hip abd, heel slides  Pain:  Pt pain was reported as  1 pre-treatment. Pt pain was reported as 4 post-treatment. Intervention: jet stream cooler     Activity Tolerance:   Good   Please refer to the flowsheet for vital signs taken during this treatment.   After treatment:   [X] Patient left in no apparent distress sitting up in chair  [ ] Patient left in no apparent distress in bed  [X] Call bell left within reach  [ ] Nursing notified  [ ] Caregiver present  [ ] Bed alarm activated      Timothy Brothers PTA   Time Calculation: 27 mins

## 2017-02-17 NOTE — PROGRESS NOTES
Spoke with pt's son Chris Sosa [phone] to let him know that PT recommends [facility] for his mother. Pt walked 400 feet this AM with PT. He states he will be picking her up later today.

## 2017-02-17 NOTE — PROGRESS NOTES
[facility]  Pulmonary, Critical Care, and Sleep Medicine    Name: Ania Hughes MRN: [id number]   : 1950 Hospital: [facility]   Date: 2017        IMPRESSION:   · End Stage OA R knee, failed conservative therapy - S/P TKR - R knee  · Asthma - Takes Allegra and Combi vent inhaler at home. · Prior Smoker (quit about 41 yrs ago)  · DM II - Not well controlled. On Metformin. HgBA1C (16) - 8.4  · HTN - Goal BP <140/90; BP is at goal w/ Norvasc, Toprol and Valsartan   · Hx of GI Bleed  · Hypercholesterolemia  · OA  · Osteoporosis  · Obesity  · Vitamin B12 Deficiency  · Vitamin D Deficiency  · Vertigo  · Hx of UTI - Recently completed course of ABX prior to surgery. · Hx of Gout - Not well controlled. On Colchicine & Uloric      RECOMMENDATIONS:   · Pt currently resting comfortably on RA. SPO2 goal >92%. Duo-nebs q6 PRN. Aggressive pulmonary toileting. Encourage incentive spirometry, PT/OT eval & treat, mobilization. · Monitor hemodynamics; PT/INR. · K+ 3.7 today after replacement yesterday, will replace again today. · Pt spiked a fever overnight - Tylenol was given, CXR report is normal. Will monitor. No fevers since. · Diabetic diet. Glycemic Control - SSI. B.S Goal <140. Monitor B/S - POC Glucose today - 158. POC glucose checks AC&HS. Restarted  yesterday. · No BM yet since surgery, added Colace yesterday. · Pain management per Ortho. · DVT, PUD prophylaxis - Management per Ortho. · Home Meds: Norvasc, Metoprolol, Prevastatin, Diovan, HCTZ. Metformin, Uloric and Colchicine    Will continue to follow from medical point of view    Events and notes from last 24 hours reviewed. Care Time: 45 min. · Prior/Old records reviewed and discussed with patient. · Labs, Images and available PFT and sleep study discussed with patient. Labs and images personally seen and available reports reviewed with patient.   · All current medicines are reviewed and doses and prescription adjusted. · Further management depending on test results and work up as outlined above. Subjective/Interval History:     17:  Pt spiked a low grade fever overnight. Tylenol was given,Ortho was made aware; CXR ordered. Otherwise, no new events overnight or this morning. No ABX needed at this time. Pt should still be able to go home today from a medical standpoint. Currently, pt resting comfortably in reclining chair; appears comfortable on RA. Pt admits she felt some chills yesterday (correlating to the fever), but states that has resolved. Denies any pain or complaint. Currently denies H/A, IKM, CP, palpitations, abd pain, N/V/D, fever/chills, urinary symptoms. Pt still has not had a BM since surgery, but is not in any distress at this time. ROS:A comprehensive review of systems was negative. Objective:   Vital Signs:    Visit Vitals    /79 (BP 1 Location: Right arm, BP Patient Position: At rest)    Pulse 69    Temp 98.5 °F (36.9 °C)    Resp 18    Ht 5' 4\" (1.626 m)    Wt 80.4 kg (177 lb 3.2 oz)    SpO2 95%    BMI 30.42 kg/m2       O2 Device: Nasal cannula   O2 Flow Rate (L/min): 2 l/min   Temp (24hrs), Av.5 °F (37.5 °C), Min:98.5 °F (36.9 °C), Max:101 °F (38.3 °C)       Intake/Output:   Last shift:         Last 3 shifts: 02/15 1901 -  0700  In: 980 [P.O.:980]  Out: 4920 [Urine:4920]    Intake/Output Summary (Last 24 hours) at 17 0814  Last data filed at 17 0675   Gross per 24 hour   Intake              680 ml   Output             3920 ml   Net            -3240 ml        Physical Exam:    General: Resting comfortably on RA, NAD,appears stated age. HEENT: pupils reactive, sclera anicteric, EOM intact   Neck: No adenopathy or thyroid swelling, no lymphadenopathy or JVD, supple   CVS: RRR, S1S2 normal; No m/r/g   RS: Symmetrical chest rise; CTAB; No wheezes/rhonchi/rales noted.    Abd: soft, non tender; +B.S x4; No hepatosplenomegaly   Neuro: non focal, awake, alert   Lymph node: No obvious palpable lymph node appreciated. Skin: No rash   Extremities: Normal, atraumatic, no cyanosis or edema. Dressings examined. right leg Hemovac    Prophylaxis- DVT: appropriate       Surgical site: appropiate        DATA:  Labs:  Recent Labs      02/17/17   0236  02/16/17   0252  02/15/17   0350   WBC  7.1  6.6  6.9   HGB  9.5*  10.2*  10.3*   HCT  29.4*  31.1*  31.3*   PLT  154  166  173     Recent Labs      02/17/17   0236  02/16/17   0252  02/15/17   0350   NA  138  138  139   K  3.7  3.8  3.5   CL  101  100  102   CO2  30  30  28   GLU  147*  177*  135*   BUN  13  18  16   CREA  0.89  0.98  0.96   CA  8.5  8.5  8.3*   INR  2.2*  1.6*  1.3*     No results for input(s): PH, PCO2, PO2, HCO3, FIO2 in the last 72 hours. Imaging:   CXR AP & LAT [02/16/17]:  COMPARISON: Chest x-ray February 8, 2017. FINDINGS: PA and lateral views obtained. The cardiac and mediastinal silhouette is normal.  The lungs are clear. The costophrenic angles are sharply defined. Pulmonary  vascularity is normal. Mild disc space narrowing with marginal spurring involves  the mid and lower thoracic spine.     IMPRESSION:  No acute finding      Paulette Vyas PA-C

## 2017-02-17 NOTE — PROGRESS NOTES
Patient temperature elevated, call KAYLYNN Wiggins at [phone] to discuss patient discharge plan and current temperature. He order urinalysis and chest x-ray on patient. Gave 650 mg tylenol to patient to treat fever, will re-check temperature  in an hour. Collect urine specimen and sent to the lab at 1730. Patient was  for X-ray in stable condition.

## 2017-02-17 NOTE — DIABETES MGMT
Glycemic Control Plan of Care    Assessment/Recommendations:  Patient is 79year old with past medical history including type 2 diabetes mellitus, left breast cancer, hypertension, and hypercholesterolemia - was admitted on 02/14/2017 for elective ortho procedure. Noted:  Primary osteoarthritis of right knee status post right total knee arthroplasty on 02/14/2017. Type 2 diabetes mellitus with current A1C of 6.7% (02/14/2017). POC BG range on 02/16/2017: 135-180 mg/dL. POC BG report on 02/17/2017 at time of review: 138 mg/dL. Patient received 4 units of correctional lispro insulin (normal sensitivity dose) on 02/16/2017. Patient stated that she is just waiting to be transferred later today to a SNF for rehab. Recommendation: Continue monitoring and correctional insulin. Most recent blood glucose values:    Results for Dioni Hoyt (MRN [id number]) as of 2/17/2017 10:04   Ref. Range 2/16/2017 05:06 2/16/2017 11:14 2/16/2017 17:18 2/16/2017 21:27   GLUCOSE,FAST - POC Latest Ref Range: 70 - 110 mg/dL 158 (H) 135 (H) 180 (H) 141 (H)     Results for Dioni Hoyt (MRN [id number]) as of 2/17/2017 10:04   Ref. Range 2/17/2017 06:05   GLUCOSE,FAST - POC Latest Ref Range: 70 - 110 mg/dL 138 (H)     Current A1C: 6.7% (02/14/2017) is equivalent to average blood glucose of 146 mg/dL during the past 2-3 months. Current hospital diabetes medications:  Correctional lispro insulin 4x daily (before meals and bedtime). Normal sensitivity dose. Metformin 1000 mg twice daily with meals. Total daily dose insulin requirement previous day:  02/16/2017  Lispro: 4 units    Home diabetes medications: As stated by patient on 02/15/2017:  Metformin 500 mg twice daily with meals.     Diet:  Diabetic consistent carb regular    Goals:  Blood glucose will be within target range of  mg/dL by 02/19/2017    Education:  _X__  Refer to Diabetes Education Record: 02/15/2017             ___  Education not indicated at this time    Celeste Lemos,

## 2017-02-17 NOTE — HOME CARE
Noted discharge cancelled for 2/16 - discharge orders for [facility] schedulers notified and [facility]'S HOSPITAL visit moved to 2/18/17 - GUICHO Santos LPN

## 2017-02-17 NOTE — DISCHARGE INSTRUCTIONS
Patient armband removed and shredded       Arthritis: Care Instructions  Your Care Instructions  Arthritis, also called osteoarthritis, is a breakdown of the cartilage that cushions your joints. When the cartilage wears down, your bones rub against each other. This causes pain and stiffness. Many people have some arthritis as they age. Arthritis most often affects the joints of the spine, hands, hips, knees, or feet. You can take simple measures to protect your joints, ease your pain, and help you stay active. Follow-up care is a key part of your treatment and safety. Be sure to make and go to all appointments, and call your doctor if you are having problems. It's also a good idea to know your test results and keep a list of the medicines you take. How can you care for yourself at home? · Stay at a healthy weight. Being overweight puts extra strain on your joints. · Talk to your doctor or physical therapist about exercises that will help ease joint pain. ¨ Stretch. You may enjoy gentle forms of yoga to help keep your joints and muscles flexible. ¨ Walk instead of jog. Other types of exercise that are less stressful on the joints include riding a bicycle, swimming, or water exercise. ¨ Lift weights. Strong muscles help reduce stress on your joints. Stronger thigh muscles, for example, take some of the stress off of the knees and hips. Learn the right way to lift weights so you do not make joint pain worse. · Take your medicines exactly as prescribed. Call your doctor if you think you are having a problem with your medicine. · Take pain medicines exactly as directed. ¨ If the doctor gave you a prescription medicine for pain, take it as prescribed. ¨ If you are not taking a prescription pain medicine, ask your doctor if you can take an over-the-counter medicine. · Use a cane, crutch, walker, or another device if you need help to get around. These can help rest your joints.  You also can use other things to make life easier, such as a higher toilet seat and padded handles on kitchen utensils. · Do not sit in low chairs, which can make it hard to get up. · Put heat or cold on your sore joints as needed. Use whichever helps you most. You also can take turns with hot and cold packs. ¨ Apply heat 2 or 3 times a day for 20 to 30 minutes--using a heating pad, hot shower, or hot pack--to relieve pain and stiffness. ¨ Put ice or a cold pack on your sore joint for 10 to 20 minutes at a time. Put a thin cloth between the ice and your skin. When should you call for help? Call your doctor now or seek immediate medical care if:  · You have sudden swelling, warmth, or pain in any joint. · You have joint pain and a fever or rash. · You have such bad pain that you cannot use a joint. Watch closely for changes in your health, and be sure to contact your doctor if:  · You have mild joint symptoms that continue even with more than 6 weeks of care at home. · You have stomach pain or other problems with your medicine. Where can you learn more? Go to http://[email or web address].info/. Enter N272 in the search box to learn more about \"Arthritis: Care Instructions. \"  Current as of: February 24, 2016  Content Version: 11.1  © 7265-6393 back[employer]ch. Care instructions adapted under license by [employer] (which disclaims liability or warranty for this information). If you have questions about a medical condition or this instruction, always ask your healthcare professional. [place] [address] any warranty or liability for your use of this information. [employer] Activation    Thank you for requesting access to [employer]. Please follow the instructions below to securely access and download your online medical record. [employer] allows you to send messages to your doctor, view your test results, renew your prescriptions, schedule appointments, and more. How Do I Sign Up? 1.  In your internet browser, go to [email or web address]  2. Click on the First Time User? Click Here link in the Sign In box. You will be redirect to the New Member Sign Up page. 3. Enter your Vocation Access Code exactly as it appears below. You will not need to use this code after youve completed the sign-up process. If you do not sign up before the expiration date, you must request a new code. [employer] Access Code: Activation code not generated  Current Vocation Status: Active (This is the date your [employer] access code will )    4. Enter the last four digits of your Social Security Number (xxxx) and Date of Birth (mm/dd/yyyy) as indicated and click Submit. You will be taken to the next sign-up page. 5. Create a Vocation ID. This will be your Vocation login ID and cannot be changed, so think of one that is secure and easy to remember. 6. Create a Vocation password. You can change your password at any time. 7. Enter your Password Reset Question and Answer. This can be used at a later time if you forget your password. 8. Enter your e-mail address. You will receive e-mail notification when new information is available in [address] 9. Click Sign Up. You can now view and download portions of your medical record. 10. Click the Download Summary menu link to download a portable copy of your medical information. Additional Information    If you have questions, please visit the Frequently Asked Questions section of the Vocation website at [email or web address] Remember, Vocation is NOT to be used for urgent needs. For medical emergencies, dial 911.       DISCHARGE SUMMARY from Nurse    The following personal items are in your possession at time of discharge:    Dental Appliances: Uppers, Partials (With Bertin Rafita)  Visual Aid: Glasses           Clothing: Pants, Shirt, Undergarments, Footwear, Socks (With Bertin Rafita)  Other Valuables: Eyeglasses (Prosthesis and glasses with Bertin Rafita) PATIENT INSTRUCTIONS:    After general anesthesia or intravenous sedation, for 24 hours or while taking prescription Narcotics:  · Limit your activities  · Do not drive and operate hazardous machinery  · Do not make important personal or business decisions  · Do  not drink alcoholic beverages  · If you have not urinated within 8 hours after discharge, please contact your surgeon on call. Report the following to your surgeon:  · Excessive pain, swelling, redness or odor of or around the surgical area  · Temperature over 100.5  · Nausea and vomiting lasting longer than 4 hours or if unable to take medications  · Any signs of decreased circulation or nerve impairment to extremity: change in color, persistent  numbness, tingling, coldness or increase pain  · Any questions        What to do at Home:  Recommended activity: Activity as tolerated. If you experience any of the following symptoms pain, fever, swelling, please follow up with PCP. *  Please give a list of your current medications to your Primary Care Provider. *  Please update this list whenever your medications are discontinued, doses are      changed, or new medications (including over-the-counter products) are added. *  Please carry medication information at all times in case of emergency situations. These are general instructions for a healthy lifestyle:    No smoking/ No tobacco products/ Avoid exposure to second hand smoke    Surgeon General's Warning:  Quitting smoking now greatly reduces serious risk to your health.     Obesity, smoking, and sedentary lifestyle greatly increases your risk for illness    A healthy diet, regular physical exercise & weight monitoring are important for maintaining a healthy lifestyle    You may be retaining fluid if you have a history of heart failure or if you experience any of the following symptoms:  Weight gain of 3 pounds or more overnight or 5 pounds in a week, increased swelling in our hands or feet or shortness of breath while lying flat in bed. Please call your doctor as soon as you notice any of these symptoms; do not wait until your next office visit. Recognize signs and symptoms of STROKE:    F-face looks uneven    A-arms unable to move or move unevenly    S-speech slurred or non-existent    T-time-call 911 as soon as signs and symptoms begin-DO NOT go       Back to bed or wait to see if you get better-TIME IS BRAIN. Warning Signs of HEART ATTACK     Call 911 if you have these symptoms:   Chest discomfort. Most heart attacks involve discomfort in the center of the chest that lasts more than a few minutes, or that goes away and comes back. It can feel like uncomfortable pressure, squeezing, fullness, or pain.  Discomfort in other areas of the upper body. Symptoms can include pain or discomfort in one or both arms, the back, neck, jaw, or stomach.  Shortness of breath with or without chest discomfort.  Other signs may include breaking out in a cold sweat, nausea, or lightheadedness. Don't wait more than five minutes to call 911 - MINUTES MATTER! Fast action can save your life. Calling 911 is almost always the fastest way to get lifesaving treatment. Emergency Medical Services staff can begin treatment when they arrive -- up to an hour sooner than if someone gets to the hospital by car. The discharge information has been reviewed with the patient. The patient verbalized understanding. Discharge medications reviewed with the patient and appropriate educational materials and side effects teaching were provided.             DISCHARGE SUMMARY from Nurse    The following personal items are in your possession at time of discharge:    Dental Appliances: Uppers, Partials (With Bertin Rafita)  Visual Aid: Glasses, At home           Clothing: Pants, Shirt, Undergarments, Footwear, Socks (With Bertin Rafita)  Other Valuables: Eyeglasses (Prosthesis and glasses with Lisseth Canchola Conrado             PATIENT INSTRUCTIONS:    After general anesthesia or intravenous sedation, for 24 hours or while taking prescription Narcotics:  · Limit your activities  · Do not drive and operate hazardous machinery  · Do not make important personal or business decisions  · Do  not drink alcoholic beverages  · If you have not urinated within 8 hours after discharge, please contact your surgeon on call. Report the following to your surgeon:  · Excessive pain, swelling, redness or odor of or around the surgical area  · Temperature over 100.5  · Nausea and vomiting lasting longer than 4 hours or if unable to take medications  · Any signs of decreased circulation or nerve impairment to extremity: change in color, persistent  numbness, tingling, coldness or increase pain  · Any questions        What to do at Home:  Recommended activity: Activity as tolerated    *  Please give a list of your current medications to your Primary Care Provider. *  Please update this list whenever your medications are discontinued, doses are      changed, or new medications (including over-the-counter products) are added. *  Please carry medication information at all times in case of emergency situations. These are general instructions for a healthy lifestyle:    No smoking/ No tobacco products/ Avoid exposure to second hand smoke    Surgeon General's Warning:  Quitting smoking now greatly reduces serious risk to your health. Obesity, smoking, and sedentary lifestyle greatly increases your risk for illness    A healthy diet, regular physical exercise & weight monitoring are important for maintaining a healthy lifestyle    You may be retaining fluid if you have a history of heart failure or if you experience any of the following symptoms:  Weight gain of 3 pounds or more overnight or 5 pounds in a week, increased swelling in our hands or feet or shortness of breath while lying flat in bed.   Please call your doctor as soon as you notice any of these symptoms; do not wait until your next office visit. Recognize signs and symptoms of STROKE:    F-face looks uneven    A-arms unable to move or move unevenly    S-speech slurred or non-existent    T-time-call 911 as soon as signs and symptoms begin-DO NOT go       Back to bed or wait to see if you get better-TIME IS BRAIN. Warning Signs of HEART ATTACK     Call 911 if you have these symptoms:   Chest discomfort. Most heart attacks involve discomfort in the center of the chest that lasts more than a few minutes, or that goes away and comes back. It can feel like uncomfortable pressure, squeezing, fullness, or pain.  Discomfort in other areas of the upper body. Symptoms can include pain or discomfort in one or both arms, the back, neck, jaw, or stomach.  Shortness of breath with or without chest discomfort.  Other signs may include breaking out in a cold sweat, nausea, or lightheadedness. Don't wait more than five minutes to call 911 - MINUTES MATTER! Fast action can save your life. Calling 911 is almost always the fastest way to get lifesaving treatment. Emergency Medical Services staff can begin treatment when they arrive -- up to an hour sooner than if someone gets to the hospital by car. Q[employer] Activation    Thank you for requesting access to Qire. Please follow the instructions below to securely access and download your online medical record. [employer]ire allows you to send messages to your doctor, view your test results, renew your prescriptions, schedule appointments, and more. How Do I Sign Up?    11. In your internet browser, go to [email or web address]  12. Click on the First Time User? Click Here link in the Sign In box. You will be redirect to the New Member Sign Up page. 15. Enter your Qire Access Code exactly as it appears below. You will not need to use this code after youve completed the sign-up process.  If you do not sign up before the expiration date, you must request a new code. ConforMIS Access Code: Activation code not generated  Current ConforMIS Status: Active (This is the date your ConforMIS access code will )    14. Enter the last four digits of your Social Security Number (xxxx) and Date of Birth (mm/dd/yyyy) as indicated and click Submit. You will be taken to the next sign-up page. 15. Create a Webdynt ID. This will be your ConforMIS login ID and cannot be changed, so think of one that is secure and easy to remember. 12. Create a Webdynt password. You can change your password at any time. 16. Enter your Password Reset Question and Answer. This can be used at a later time if you forget your password. 25. Enter your e-mail address. You will receive e-mail notification when new information is available in [address] 19. Click Sign Up. You can now view and download portions of your medical record. 20. Click the Download Summary menu link to download a portable copy of your medical information. Additional Information    If you have questions, please visit the Frequently Asked Questions section of the Confor[employer]IS website at [email or web address] Remember, ConforMIS is NOT to be used for urgent needs. For medical emergencies, dial 911. Patient armband removed and shredded  The discharge information has been reviewed with the patient. The patient verbalized understanding. Discharge medications reviewed with the patient and appropriate educational materials and side effects teaching were provided.

## 2017-02-18 ENCOUNTER — HOME CARE VISIT (OUTPATIENT)
Dept: SCHEDULING | Facility: HOME HEALTH | Age: 67
End: 2017-02-18
Payer: MEDICARE

## 2017-02-18 VITALS
DIASTOLIC BLOOD PRESSURE: 80 MMHG | SYSTOLIC BLOOD PRESSURE: 140 MMHG | TEMPERATURE: 97.7 F | HEART RATE: 71 BPM | OXYGEN SATURATION: 98 %

## 2017-02-18 PROCEDURE — 400013 HH SOC

## 2017-02-18 PROCEDURE — G0151 HHCP-SERV OF PT,EA 15 MIN: HCPCS

## 2017-02-18 PROCEDURE — G0299 HHS/HOSPICE OF RN EA 15 MIN: HCPCS

## 2017-02-19 ENCOUNTER — HOME CARE VISIT (OUTPATIENT)
Dept: HOME HEALTH SERVICES | Facility: HOME HEALTH | Age: 67
End: 2017-02-19
Payer: MEDICARE

## 2017-02-19 ENCOUNTER — HOME CARE VISIT (OUTPATIENT)
Dept: SCHEDULING | Facility: HOME HEALTH | Age: 67
End: 2017-02-19
Payer: MEDICARE

## 2017-02-19 VITALS — SYSTOLIC BLOOD PRESSURE: 140 MMHG | HEART RATE: 73 BPM | DIASTOLIC BLOOD PRESSURE: 80 MMHG | OXYGEN SATURATION: 97 %

## 2017-02-19 PROCEDURE — G0157 HHC PT ASSISTANT EA 15: HCPCS

## 2017-02-20 ENCOUNTER — HOME CARE VISIT (OUTPATIENT)
Dept: HOME HEALTH SERVICES | Facility: HOME HEALTH | Age: 67
End: 2017-02-20
Payer: MEDICARE

## 2017-02-20 ENCOUNTER — TELEPHONE (OUTPATIENT)
Dept: MEDSURG UNIT | Age: 67
End: 2017-02-20

## 2017-02-20 ENCOUNTER — HOME CARE VISIT (OUTPATIENT)
Dept: SCHEDULING | Facility: HOME HEALTH | Age: 67
End: 2017-02-20
Payer: MEDICARE

## 2017-02-20 ENCOUNTER — PATIENT OUTREACH (OUTPATIENT)
Dept: FAMILY MEDICINE CLINIC | Facility: CLINIC | Age: 67
End: 2017-02-20

## 2017-02-20 VITALS
OXYGEN SATURATION: 98 % | DIASTOLIC BLOOD PRESSURE: 76 MMHG | RESPIRATION RATE: 14 BRPM | SYSTOLIC BLOOD PRESSURE: 154 MMHG | HEART RATE: 89 BPM | TEMPERATURE: 96.6 F

## 2017-02-20 VITALS
DIASTOLIC BLOOD PRESSURE: 80 MMHG | RESPIRATION RATE: 18 BRPM | HEART RATE: 80 BPM | OXYGEN SATURATION: 98 % | SYSTOLIC BLOOD PRESSURE: 150 MMHG | TEMPERATURE: 99.8 F

## 2017-02-20 LAB
INR BLD: 1.6 (ref 0.9–1.1)
PT POC: 19.7 SEC

## 2017-02-20 PROCEDURE — G0299 HHS/HOSPICE OF RN EA 15 MIN: HCPCS

## 2017-02-20 PROCEDURE — G0157 HHC PT ASSISTANT EA 15: HCPCS

## 2017-02-20 PROCEDURE — A6213 FOAM DRG >16<=48 SQ IN W/BDR: HCPCS

## 2017-02-20 NOTE — PROGRESS NOTES
This note will not be viewable in [address] Patient admitted to [facility]\""  on 2/14/17-2/17/17 for  Right Total Knee Replacement. Discharge Home with [address] on 2/17/17  NN contact patient on 2/20/17. Patient has a history of Medical History:       Past Medical History   Diagnosis Date    Advance directive discussed with patient 8/17/2016    Aspirin intolerance 1/26/2017    Asthma      bronchitis    Cancer ([address] 1998     Left breast cancer    Cervical radiculopathy     Cigarette nicotine dependence in remission 8/17/2016    DDD (degenerative disc disease), lumbar     Degeneration of cervical intervertebral disc     Degeneration of thoracic intervertebral disc     Diabetes mellitus ([address]      type 2    Diabetes mellitus type 2, controlled ([address] 8/2/2016    Essential hypertension with goal blood pressure less than 140/90 8/10/2016    GERD (gastroesophageal reflux disease)     GI bleed 6/29/16    Gout     H. pylori infection 7/11/2016    H/O: GI bleed 1/26/2017    Hammer toe of left foot     Hypercholesterolemia     Hypertension     Lumbago with sciatica     Lumbar radiculopathy     Malignant neoplasm of left female breast ([address] 8/2/2016    Mixed incontinence 8/10/2016    Obesity, Class I, BMI 30-34.9 8/2/2016    osteoarthritis     Osteoarthrosis, generalized, involving multiple sites     Osteoporosis     Paronychia of finger     Primary insomnia 8/10/2016    Pure hypercholesterolemia 2/9/2017    Sciatica of left side     Scoliosis     Segmental and somatic dysfunction of lumbar region     Segmental and somatic dysfunction of pelvic region     Urinary tract infection     Vertigo     Vitamin B12 deficiency     Vitamin D deficiency     Wound infection      This represents Transitions of Care b/c NN spoke with patient  within 1 business day of discharge.   Pt's TCM follow up appt is scheduled with Dr. Leonides Shukla on Wednesday 2/22/17 @ 8:30 am which is within 5 calendar days of discharge. Contacted patient for hospital follow up. Introduced self, role and reason for call. Verified 2 patient identifiers (Name and Date of Birth). Patient reported:  Patient stated \" I'm doing much better. I'm moving okay\". Patient denied:  Patient denied chest pain, shortness of breath, fever and chills. Medication Reconciliation completed: no. Patient decline. Patient states that she wants to get some rest at this time. Support System consists of:  Patient's son and daughter in law. Appointments:  2/22/17 at 08:30 am with Dr. Irlanda Santillan. Patient aware of appointments. Patient's family will provide transportation. Previous Use of Home Health Agency, if so what agency? yes [address]     DME  Walker and bedside commode. Advance Medical Directive on file in EMR? yes On file. Barriers to care  No barriers to care identified at this time. Adherence to previous treatment and likelihood for follow-up:  Patient verbalized understanding of discharge instructions and special follow up. Educated patient to monitor and report the following Red flags: chest pain, shortness of breath, fever, chills, bleeding, s/s of infection, worsening of symptoms or any new or concerning symptoms. Advised patient to seek medical attention with patient provider, urgent care or return to the emergency department if any of the following symptoms  occur after being discharged from the hospital:  fever >101.5F,  chest pain, shortness of breath, leg swelling/pain, and/or return of the symptoms which initially resulted in hospitalization. Patient verbalized understanding of information discussed and is aware of  when to seek medical attention from PCP, urgent care or ED. Paris Rodriguez Instructed to bring all medications with patient to next appointment. Opportunity to ask questions was provided.  Contact information was provided for future reference, assistance, concerns, or further questions. Patient voices no concerns, assistance and questions at this time.      Plan of Care/Goals:    Patient will attend follow up appointment  Patient will be free from post hospital complications    Discharge AVS reviewed with patient and patient verbalized understanding. Side effects of pain medication reviewed with the patient.

## 2017-02-21 ENCOUNTER — HOME CARE VISIT (OUTPATIENT)
Dept: SCHEDULING | Facility: HOME HEALTH | Age: 67
End: 2017-02-21
Payer: MEDICARE

## 2017-02-21 PROCEDURE — G0157 HHC PT ASSISTANT EA 15: HCPCS

## 2017-02-22 ENCOUNTER — HOME CARE VISIT (OUTPATIENT)
Dept: SCHEDULING | Facility: HOME HEALTH | Age: 67
End: 2017-02-22
Payer: MEDICARE

## 2017-02-22 VITALS — DIASTOLIC BLOOD PRESSURE: 78 MMHG | OXYGEN SATURATION: 94 % | HEART RATE: 78 BPM | SYSTOLIC BLOOD PRESSURE: 120 MMHG

## 2017-02-22 PROCEDURE — G0157 HHC PT ASSISTANT EA 15: HCPCS

## 2017-02-23 ENCOUNTER — HOME CARE VISIT (OUTPATIENT)
Dept: SCHEDULING | Facility: HOME HEALTH | Age: 67
End: 2017-02-23
Payer: MEDICARE

## 2017-02-23 ENCOUNTER — PATIENT OUTREACH (OUTPATIENT)
Dept: FAMILY MEDICINE CLINIC | Facility: CLINIC | Age: 67
End: 2017-02-23

## 2017-02-23 ENCOUNTER — TELEPHONE (OUTPATIENT)
Dept: ORTHOPEDIC SURGERY | Age: 67
End: 2017-02-23

## 2017-02-23 ENCOUNTER — OFFICE VISIT (OUTPATIENT)
Dept: FAMILY MEDICINE CLINIC | Facility: CLINIC | Age: 67
End: 2017-02-23

## 2017-02-23 VITALS
SYSTOLIC BLOOD PRESSURE: 124 MMHG | OXYGEN SATURATION: 97 % | BODY MASS INDEX: 30.77 KG/M2 | HEIGHT: 64 IN | RESPIRATION RATE: 18 BRPM | TEMPERATURE: 98.9 F | WEIGHT: 180.2 LBS | HEART RATE: 88 BPM | DIASTOLIC BLOOD PRESSURE: 64 MMHG

## 2017-02-23 VITALS
SYSTOLIC BLOOD PRESSURE: 152 MMHG | DIASTOLIC BLOOD PRESSURE: 80 MMHG | HEART RATE: 85 BPM | OXYGEN SATURATION: 97 % | TEMPERATURE: 99.7 F

## 2017-02-23 DIAGNOSIS — M17.10 ARTHRITIS OF KNEE: Primary | ICD-10-CM

## 2017-02-23 DIAGNOSIS — I10 ESSENTIAL HYPERTENSION WITH GOAL BLOOD PRESSURE LESS THAN 140/90: ICD-10-CM

## 2017-02-23 DIAGNOSIS — R30.9 URINARY PAIN: ICD-10-CM

## 2017-02-23 DIAGNOSIS — E11.9 CONTROLLED TYPE 2 DIABETES MELLITUS WITHOUT COMPLICATION, WITHOUT LONG-TERM CURRENT USE OF INSULIN (HCC): ICD-10-CM

## 2017-02-23 DIAGNOSIS — E66.9 OBESITY, CLASS I, BMI 30-34.9: ICD-10-CM

## 2017-02-23 LAB
BILIRUB UR QL STRIP: NORMAL
GLUCOSE UR-MCNC: NEGATIVE MG/DL
KETONES P FAST UR STRIP-MCNC: NEGATIVE MG/DL
PH UR STRIP: 5.5 [PH] (ref 4.6–8)
PROT UR QL STRIP: NORMAL MG/DL
SP GR UR STRIP: 1.01 (ref 1–1.03)
UA UROBILINOGEN AMB POC: NORMAL (ref 0.2–1)
URINALYSIS CLARITY POC: CLEAR
URINALYSIS COLOR POC: YELLOW
URINE BLOOD POC: NEGATIVE
URINE LEUKOCYTES POC: NEGATIVE
URINE NITRITES POC: NEGATIVE

## 2017-02-23 PROCEDURE — G0299 HHS/HOSPICE OF RN EA 15 MIN: HCPCS

## 2017-02-23 PROCEDURE — G0157 HHC PT ASSISTANT EA 15: HCPCS

## 2017-02-23 RX ORDER — PNEUMOCOCCAL 13-VALENT CONJUGATE VACCINE 2.2; 2.2; 2.2; 2.2; 2.2; 4.4; 2.2; 2.2; 2.2; 2.2; 2.2; 2.2; 2.2 UG/.5ML; UG/.5ML; UG/.5ML; UG/.5ML; UG/.5ML; UG/.5ML; UG/.5ML; UG/.5ML; UG/.5ML; UG/.5ML; UG/.5ML; UG/.5ML; UG/.5ML
INJECTION, SUSPENSION INTRAMUSCULAR
Refills: 0 | COMMUNITY
Start: 2017-02-09 | End: 2017-02-23 | Stop reason: ALTCHOICE

## 2017-02-23 RX ORDER — TRAMADOL HYDROCHLORIDE 50 MG/1
TABLET ORAL
Refills: 0 | COMMUNITY
Start: 2017-01-23 | End: 2017-02-23

## 2017-02-23 RX ORDER — INDOMETHACIN 50 MG/1
CAPSULE ORAL
Refills: 3 | COMMUNITY
Start: 2017-01-03

## 2017-02-23 NOTE — PROGRESS NOTES
This note will not be viewable in [address] Patient admitted to [facility]  on 2/14/17-2/17/17 for  Right Total Knee Replacement. Discharge Home with [facility] on 2/17/17  NN contact patient on 2/20/17. I met the patient in  the office today 2/23/17. Patient's states that she is doing great. Patient looks well. No S/S of acute distress noted on patient at this time. No S/S of infection as per patient. No concerns, assistance and/or questions verbalized by patient  at this time. Office contact information was provided for future reference, assistance, concerns, or further questions. Will continue to follow.

## 2017-02-23 NOTE — PATIENT INSTRUCTIONS
Total Knee Replacement: What to Expect at [address] [place] [address]    When you leave the hospital, you should be able to move around with a walker or crutches. But you will need someone to help you at home for the next few weeks or until you have more energy and can move around better. If there is no one to help you at home, you may go to a rehabilitation center. You will go home with a bandage and stitches or staples. Change the bandage as your doctor tells you to. Your doctor will remove your stitches or staples 10 to 21 days after your surgery. You may still have some mild pain, and the area may be swollen for 3 to 6 months after surgery. Your knee will continue to improve for 6 to 12 months. You will probably use a walker for 1 to 3 weeks and then use crutches. When you are ready, you can use a cane. You will probably be able to walk on your own in 4 to 8 weeks. You will need to do months of physical rehabilitation (rehab) after a knee replacement. Rehab will help you strengthen the muscles of the knee and help you regain movement. After you recover, your artificial knee will allow you to do normal daily activities with less pain or no pain at all. You may be able to hike, dance, ride a bike, and play golf. Talk to your doctor about whether you can do more strenuous activities. Always tell your caregivers that you have an artificial knee. How long it will take to walk on your own, return to normal activities, and go back to work depends on your health and how well your rehabilitation (rehab) program goes. The better you do with your rehab exercises, the quicker you will get your strength and movement back. This care sheet gives you a general idea about how long it will take for you to recover. But each person recovers at a different pace. Follow the steps below to get better as quickly as possible. How can you care for yourself at home? Activity  · Rest when you feel tired.  You may take a nap, but do not stay in bed all day. When you sit, use a chair with arms. You can use the arms to help you stand up. · Work with your physical therapist to find the best way to exercise. You may be able to take frequent, short walks using crutches or a walker. What you can do as your knee heals will depend on whether your new knee is cemented or uncemented. You may not be able to do certain things for a while if your new knee is uncemented. · After your knee has healed enough, you can do more strenuous activities with caution. ¨ You can golf, but use a golf cart, and do not wear shoes with spikes. ¨ You can bike on a flat road or on a stationary bike. Avoid biking up hills. ¨ Your doctor may suggest that you stay away from activities that put stress on your knee. These include tennis or badminton, squash or racquetball, contact sports like football, jumping (such as in basketball), jogging, or running. ¨ Avoid activities where you might fall. These include horseback riding, skiing, and mountain biking. · Do not sit for more than 1 hour at a time. Get up and walk around for a while before you sit again. If you must sit for a long time, prop up your leg with a chair or footstool. This will help you avoid swelling. · Ask your doctor when you can shower. You may need to take sponge baths until your stitches or staples have been removed. · Ask your doctor when you can drive again. It may take up to 8 weeks after knee replacement surgery before it is safe for you to drive. · When you get into a car, sit on the edge of the seat. Then pull in your legs, and turn to face the front. · You should be able to do many everyday activities 3 to 6 weeks after your surgery. You will probably need to take 4 to 16 weeks off from work. When you can go back to work depends on the type of work you do and how you feel. · Ask your doctor when it is okay for you to have sex.   · Do not lift anything heavier than 10 pounds and do not lift weights for 12 weeks. Diet  · By the time you leave the hospital, you should be eating your normal diet. If your stomach is upset, try bland, low-fat foods like plain rice, broiled chicken, toast, and yogurt. Your doctor may suggest that you take iron and vitamin supplements. · Drink plenty of fluids (unless your doctor tells you not to). · Eat healthy foods, and watch your portion sizes. Try to stay at your ideal weight. Too much weight puts more stress on your new knee. · You may notice that your bowel movements are not regular right after your surgery. This is common. Try to avoid constipation and straining with bowel movements. You may want to take a fiber supplement every day. If you have not had a bowel movement after a couple of days, ask your doctor about taking a mild laxative. Medicines  · Your doctor will tell you if and when you can restart your medicines. He or she will also give you instructions about taking any new medicines. · If you take blood thinners, such as warfarin (Coumadin), clopidogrel (Plavix), or aspirin, be sure to talk to your doctor. He or she will tell you if and when to start taking those medicines again. Make sure that you understand exactly what your doctor wants you to do. · Your doctor may give you a blood-thinning medicine to prevent blood clots. If you take a blood thinner, be sure you get instructions about how to take your medicine safely. Blood thinners can cause serious bleeding problems. This medicine could be in pill form or as a shot (injection). If a shot is necessary, your doctor will tell you how to do this. · Be safe with medicines. Take pain medicines exactly as directed. ¨ If the doctor gave you a prescription medicine for pain, take it as prescribed. ¨ If you are not taking a prescription pain medicine, ask your doctor if you can take an over-the-counter medicine. ¨ Plan to take your pain medicine 30 minutes before exercises.  It is easier to prevent pain before it starts than to stop it once it has started. · If you think your pain medicine is making you sick to your stomach:  ¨ Take your medicine after meals (unless your doctor has told you not to). ¨ Ask your doctor for a different pain medicine. · If your doctor prescribed antibiotics, take them as directed. Do not stop taking them just because you feel better. You need to take the full course of antibiotics. Incision care  · You will have a bandage over the cut (incision) and staples or stitches. Take the bandage off when your doctor says it is okay. · Your doctor will remove the staples or stitches 10 days to 3 weeks after the surgery and replace them with strips of tape. Leave the tape on for a week or until it falls off. Exercise  · Your rehab program will give you a number of exercises to do to help you get back your knee's range of motion and strength. Always do them as your therapist tells you. Ice and elevation  · For pain and swelling, put ice or a cold pack on the area for 10 to 20 minutes at a time. Put a thin cloth between the ice and your skin. Other instructions  · Continue to wear your support stockings as your doctor says. These help to prevent blood clots. The length of time that you will have to wear them depends on your activity level and the amount of swelling. · Wear medical alert jewelry that says you may need antibiotics before any procedure, including dental work. You can buy this at most drugstores. · You have metal pieces in your knee. These may set off some airport metal detectors. Carry a medical alert card that says you have an artificial joint, just in case. Follow-up care is a key part of your treatment and safety. Be sure to make and go to all appointments, and call your doctor if you are having problems. It's also a good idea to know your test results and keep a list of the medicines you take. When should you call for help?   Call 911 anytime you think you may need emergency care. For example, call if:  · You passed out (lost consciousness). · You have severe trouble breathing. · You have sudden chest pain and shortness of breath, or you cough up blood. Call your doctor now or seek immediate medical care if:  · You have signs of infection, such as:  ¨ Increased pain, swelling, warmth, or redness. ¨ Red streaks leading from the incision. ¨ Pus draining from the incision. ¨ A fever. · You have signs of a blood clot, such as:  ¨ Pain in your calf, back of the knee, thigh, or groin. ¨ Redness and swelling in your leg or groin. · Your incision comes open and begins to bleed, or the bleeding increases. · You have pain that does not get better after you take pain medicine. Watch closely for changes in your health, and be sure to contact your doctor if:  · You do not have a bowel movement after taking a laxative. Where can you learn more? Go to http://tatiana-harvinder.info/. Enter G895 in the search box to learn more about \"Total Knee Replacement: What to Expect at Home. \"  Current as of: August 4, 2016  Content Version: 11.1  © 0358-4885 [employer]. Care instructions adapted under license by [employer] (which disclaims liability or warranty for this information). If you have questions about a medical condition or this instruction, always ask your healthcare professional. [place] [address] any warranty or liability for your use of this information.

## 2017-02-23 NOTE — TELEPHONE ENCOUNTER
[address] [place] [address] DR. Elieser Tracy. SHE SAID     PT# 17.9  INR# 1.5    COUMADIN : 3 MG DAILY    DEBI TEL. [phone].

## 2017-02-23 NOTE — MR AVS SNAPSHOT
Visit Information Date & Time Provider Department Dept. Phone Encounter #  
 2/23/2017 10:15 AM Alex Mcintyre MD Hilda Kennedy 47 [phone] [id number] Follow-up Instructions Return in about 3 months (around 5/23/2017) for Follow up hypertension, Follow up diabetes mellitus, Follow up hyperlipidemia. Your Appointments 3/2/2017  9:45 AM  
POST OP with Ayah Higginbotham PA-C  
[facility] - [facility] Appt Note: SX 2/14/2017 RIGHT TOTAL KNEE ARTHROPLASTY  
 [address] [facility], [address] [facility] [address]  
[phone]  
  
   
 [address] [place] [address], [address] [place] [address] [address] [address] Upcoming Health Maintenance Date Due  
 EYE EXAM RETINAL OR DILATED Q1 1/25/1960 Pneumococcal 65+ High/Highest Risk (2 of 2 - PPSV23) 4/6/2017 LIPID PANEL Q1 8/2/2017 MICROALBUMIN Q1 8/10/2017 HEMOGLOBIN A1C Q6M 8/14/2017 FOBT Q 1 YEAR AGE 50-75 8/17/2017 MEDICARE YEARLY EXAM 8/18/2017 FOOT EXAM Q1 9/19/2017 BREAST CANCER SCRN MAMMOGRAM 2/28/2018 GLAUCOMA SCREENING Q2Y 3/1/2018 DTaP/Tdap/Td series (2 - Td) 8/17/2026 Allergies as of 2/23/2017  Review Complete On: 2/23/2017 By: Alex Mcintyre MD  
  
 Severity Noted Reaction Type Reactions Nsaids (Non-steroidal Anti-inflammatory Drug) High 08/02/2016   Systemic Other (comments) GI Bleed Aspirin  08/02/2016   Systemic Other (comments) Gi bleed Current Immunizations  Never Reviewed Name Date Pneumococcal Conjugate (PCV-13) 2/9/2017 Not reviewed this visit You Were Diagnosed With   
  
 Codes Comments Urinary pain    -  Primary ICD-10-CM: R30.9 ICD-9-CM: 000. 1 Vitals BP  
  
  
  
  
  
 124/64 (BP 1 Location: Right arm, BP Patient Position: Sitting) BMI and BSA Data Body Mass Index Body Surface Area 30.93 kg/m 2 1.92 m 2 Preferred Pharmacy Pharmacy Name Phone [facility] [place], [address] [phone] Your Updated Medication List  
  
   
This list is accurate as of: 2/23/17 12:25 PM.  Always use your most recent med list. amLODIPine 10 mg tablet Commonly known as:  Ezra Vidales Take 1 Tab by mouth daily. colchicine 0.6 mg tablet Take 1 Tab by mouth daily. CRANBERRY CONCENTRATE PO Take 2 Tabs by mouth daily. febuxostat 40 mg Tab tablet Commonly known as:  Negrito Motts Take 1 Tab by mouth daily. ferrous sulfate 325 mg (65 mg iron) tablet Take 1 Tab by mouth two (2) times daily (with meals). fexofenadine 180 mg tablet Commonly known as:  Daiana Holmalathi Take 1 Tab by mouth daily. indomethacin 50 mg capsule Commonly known as:  INDOCIN  
TK ONE C PO TID LORazepam 1 mg tablet Commonly known as:  ATIVAN Take 1 Tab by mouth every eight (8) hours as needed. Max Daily Amount: 3 mg. Indications: ANXIETY  
  
 melatonin 3 mg tablet Take 1 Tab by mouth nightly. metFORMIN 500 mg tablet Commonly known as:  GLUCOPHAGE Take 1.5 Tabs by mouth two (2) times daily (with meals). metoprolol succinate 50 mg XL tablet Commonly known as:  TOPROL-XL Take 1 Tab by mouth daily. oxybutynin chloride XL 10 mg CR tablet Commonly known as:  DITROPAN XL Take 1 Tab by mouth daily. oxyCODONE-acetaminophen 7.5-325 mg per tablet Commonly known as:  PERCOCET 7.5 Take 1-2 Tabs by mouth every four (4) hours as needed. Max Daily Amount: 12 Tabs. pantoprazole 40 mg tablet Commonly known as:  PROTONIX Take 40 mg by mouth daily as needed. pravastatin 20 mg tablet Commonly known as:  PRAVACHOL Take 1 Tab by mouth nightly. senna 8.6 mg tablet Commonly known as:  [employer] Take 2 Tabs by mouth nightly. valsartan-hydroCHLOROthiazide 160-12.5 mg per tablet Commonly known as:  DIOVAN-HCT  
 Take 1 Tab by mouth daily. warfarin 3 mg tablet Commonly known as:  COUMADIN Take 1 Tab by mouth daily for 30 days. We Performed the Following AMB POC URINALYSIS DIP STICK AUTO W/O MICRO [33841 CPT(R)] Follow-up Instructions Return in about 3 months (around 5/23/2017) for Follow up hypertension, Follow up diabetes mellitus, Follow up hyperlipidemia. To-Do List   
 02/23/2017 2:00 PM  
  Appointment with Tere Parra PTA at [address] [place] [address] CTR  
  
 02/24/2017 To Be Determined Appointment with Tere Parra PTA at [address] [place] [address] MED CTR  
  
 02/25/2017 To Be Determined Appointment with Tere Parra PTA at [address] [place] [address] CTR  
  
 02/25/2017 To Be Determined Appointment with Yoshi Cárdenas RN at [address] [place] [address] CTR  
  
 02/26/2017 To Be Determined Appointment with Tere Parra PTA at [address] [place] [address] CTR  
  
 02/27/2017 To Be Determined Appointment with Tere Parra PTA at [address] [place] [address] MED CTR  
  
 02/27/2017 To Be Determined Appointment with Kenyatta Rg RN at [address] [place] [address] CTR  
  
 02/28/2017 To Be Determined Appointment with Tere Parra PTA at [address] [place] [address] SCHEDULING/INTAKE  
  
 03/01/2017 To Be Determined Appointment with Tere Parra PTA at [address] [place] [address] SCHEDULING/INTAKE  
  
 03/01/2017 To Be Determined Appointment with Kenyatta Rg RN at [address] [place] [address] MED CTR  
  
 03/02/2017 To Be Determined Appointment with Tere Parra PTA at [address] [place] [address] SCHEDULING/INTAKE  
  
 03/06/2017 To Be Determined Appointment with Abisai Tolliver RN at [address] [facility] CTR  
  
 03/08/2017 To Be Determined Appointment with Abisai Tolliver RN at [address] [facility] Patient Instructions Total Knee Replacement: What to Expect at Home Your Recovery When you leave the hospital, you should be able to move around with a walker or crutches. But you will need someone to help you at home for the next few weeks or until you have more energy and can move around better. If there is no one to help you at home, you may go to a rehabilitation center. You will go home with a bandage and stitches or staples. Change the bandage as your doctor tells you to. Your doctor will remove your stitches or staples 10 to 21 days after your surgery. You may still have some mild pain, and the area may be swollen for 3 to 6 months after surgery. Your knee will continue to improve for 6 to 12 months. You will probably use a walker for 1 to 3 weeks and then use crutches. When you are ready, you can use a cane. You will probably be able to walk on your own in 4 to 8 weeks. You will need to do months of physical rehabilitation (rehab) after a knee replacement. Rehab will help you strengthen the muscles of the knee and help you regain movement. After you recover, your artificial knee will allow you to do normal daily activities with less pain or no pain at all. You may be able to hike, dance, ride a bike, and play golf. Talk to your doctor about whether you can do more strenuous activities. Always tell your caregivers that you have an artificial knee. How long it will take to walk on your own, return to normal activities, and go back to work depends on your health and how well your rehabilitation (rehab) program goes. The better you do with your rehab exercises, the quicker you will get your strength and movement back. This care sheet gives you a general idea about how long it will take for you to recover. But each person recovers at a different pace. Follow the steps below to get better as quickly as possible. How can you care for yourself at home? Activity · Rest when you feel tired. You may take a nap, but do not stay in bed all day. When you sit, use a chair with arms. You can use the arms to help you stand up. · Work with your physical therapist to find the best way to exercise. You may be able to take frequent, short walks using crutches or a walker. What you can do as your knee heals will depend on whether your new knee is cemented or uncemented. You may not be able to do certain things for a while if your new knee is uncemented. · After your knee has healed enough, you can do more strenuous activities with caution. ¨ You can golf, but use a golf cart, and do not wear shoes with spikes. ¨ You can bike on a flat road or on a stationary bike. Avoid biking up hills. ¨ Your doctor may suggest that you stay away from activities that put stress on your knee. These include tennis or badminton, squash or racquetball, contact sports like football, jumping (such as in basketball), jogging, or running. ¨ Avoid activities where you might fall. These include horseback riding, skiing, and mountain biking. · Do not sit for more than 1 hour at a time. Get up and walk around for a while before you sit again. If you must sit for a long time, prop up your leg with a chair or footstool. This will help you avoid swelling. · Ask your doctor when you can shower. You may need to take sponge baths until your stitches or staples have been removed. · Ask your doctor when you can drive again. It may take up to 8 weeks after knee replacement surgery before it is safe for you to drive. · When you get into a car, sit on the edge of the seat. Then pull in your legs, and turn to face the front. · You should be able to do many everyday activities 3 to 6 weeks after your surgery. You will probably need to take 4 to 16 weeks off from work. When you can go back to work depends on the type of work you do and how you feel. · Ask your doctor when it is okay for you to have sex. · Do not lift anything heavier than 10 pounds and do not lift weights for 12 weeks. Diet · By the time you leave the hospital, you should be eating your normal diet. If your stomach is upset, try bland, low-fat foods like plain rice, broiled chicken, toast, and yogurt. Your doctor may suggest that you take iron and vitamin supplements. · Drink plenty of fluids (unless your doctor tells you not to). · Eat healthy foods, and watch your portion sizes. Try to stay at your ideal weight. Too much weight puts more stress on your new knee. · You may notice that your bowel movements are not regular right after your surgery. This is common. Try to avoid constipation and straining with bowel movements. You may want to take a fiber supplement every day. If you have not had a bowel movement after a couple of days, ask your doctor about taking a mild laxative. Medicines · Your doctor will tell you if and when you can restart your medicines. He or she will also give you instructions about taking any new medicines. · If you take blood thinners, such as warfarin (Coumadin), clopidogrel (Plavix), or aspirin, be sure to talk to your doctor. He or she will tell you if and when to start taking those medicines again. Make sure that you understand exactly what your doctor wants you to do. · Your doctor may give you a blood-thinning medicine to prevent blood clots. If you take a blood thinner, be sure you get instructions about how to take your medicine safely. Blood thinners can cause serious bleeding problems. This medicine could be in pill form or as a shot (injection). If a shot is necessary, your doctor will tell you how to do this. · Be safe with medicines. Take pain medicines exactly as directed. ¨ If the doctor gave you a prescription medicine for pain, take it as prescribed. ¨ If you are not taking a prescription pain medicine, ask your doctor if you can take an over-the-counter medicine. ¨ Plan to take your pain medicine 30 minutes before exercises. It is easier to prevent pain before it starts than to stop it once it has started. · If you think your pain medicine is making you sick to your stomach: 
¨ Take your medicine after meals (unless your doctor has told you not to). ¨ Ask your doctor for a different pain medicine. · If your doctor prescribed antibiotics, take them as directed. Do not stop taking them just because you feel better. You need to take the full course of antibiotics. Incision care · You will have a bandage over the cut (incision) and staples or stitches. Take the bandage off when your doctor says it is okay. · Your doctor will remove the staples or stitches 10 days to 3 weeks after the surgery and replace them with strips of tape. Leave the tape on for a week or until it falls off. Exercise · Your rehab program will give you a number of exercises to do to help you get back your knee's range of motion and strength. Always do them as your therapist tells you. Ice and elevation · For pain and swelling, put ice or a cold pack on the area for 10 to 20 minutes at a time. Put a thin cloth between the ice and your skin. Other instructions · Continue to wear your support stockings as your doctor says. These help to prevent blood clots. The length of time that you will have to wear them depends on your activity level and the amount of swelling. · Wear medical alert jewelry that says you may need antibiotics before any procedure, including dental work. You can buy this at most [employer]. · You have metal pieces in your knee.  These may set off some airport metal detectors. Carry a medical alert card that says you have an artificial joint, just in case. Follow-up care is a key part of your treatment and safety. Be sure to make and go to all appointments, and call your doctor if you are having problems. It's also a good idea to know your test results and keep a list of the medicines you take. When should you call for help? Call 911 anytime you think you may need emergency care. For example, call if: 
· You passed out (lost consciousness). · You have severe trouble breathing. · You have sudden chest pain and shortness of breath, or you cough up blood. Call your doctor now or seek immediate medical care if: 
· You have signs of infection, such as: 
¨ Increased pain, swelling, warmth, or redness. ¨ Red streaks leading from the incision. ¨ Pus draining from the incision. ¨ A fever. · You have signs of a blood clot, such as: 
¨ Pain in your calf, back of the knee, thigh, or groin. ¨ Redness and swelling in your leg or groin. · Your incision comes open and begins to bleed, or the bleeding increases. · You have pain that does not get better after you take pain medicine. Watch closely for changes in your health, and be sure to contact your doctor if: 
· You do not have a bowel movement after taking a laxative. Where can you learn more? Go to http://tatiana-harvinder.info/. Enter N563 in the search box to learn more about \"Total Knee Replacement: What to Expect at Home. \" Current as of: August 4, 2016 Content Version: 11.1 © 9714-8275 [employer]. Care instructions adapted under license by [employer] (which disclaims liability or warranty for this information). If you have questions about a medical condition or this instruction, always ask your healthcare professional. [place] [address] any warranty or liability for your use of this information. Introducing [facility]! Dear Ángel Pedraza: Thank you for requesting a revoPT account. Our records indicate that you already have an active revoPT account. You can access your account anytime at [email or web address] Did you know that you can access your hospital and ER discharge instructions at any time in revoPT? You can also review all of your test results from your hospital stay or ER visit. Additional Information If you have questions, please visit the Frequently Asked Questions section of the revoPT website at [email or web address] Remember, revoPT is NOT to be used for urgent needs. For medical emergencies, dial 911. Now available from your iPhone and Android! Please provide this summary of care documentation to your next provider. Your primary care clinician is listed as Catia Santo. If you have any questions after today's visit, please call [phone].

## 2017-02-23 NOTE — TELEPHONE ENCOUNTER
Contacted Paula at [facility]. Informed her of new orders for pts Coumadin. Paula verbalized and repeated new orders.

## 2017-02-23 NOTE — PROGRESS NOTES
Internal Medicine Progress Note    Today's Date:  3/1/2017   Patient:  Mansoor Palacios  Patient :  1950    Subjective:     Chief Complaint   Patient presents with    Transitions Of Care    Urinary Pain      Transitional care   Pt was discharged from the hospital on 17. Nurse navigator spoke with the pt on 17. Pt was seen in the office on 17. The complexity of this is high. Diabetes mellitus  This is a chronic problem. BS is at goal. Pt is on metformin. Pt is not on aspirin due to h/o GI bleed.  Pt is on a statin.       Hypertension   This is a chronic problem. BP is at goal. Pt takes norvasc, toprol and valsartan/HCTZ. Pt reports compliance with these medications. Bilateral knee arthritis  This is a chronic problem. This is not at goal. Pt is s/p right knee replacement. Pt takes tramadol and ativan. Gout   This is a chronic problem. This is not controlled. This is present in her right hand. Pt is taking colchicine and uloric. Allopurinol caused swelling.      Past Medical History:   Diagnosis Date    Advance directive discussed with patient 2016    Aspirin intolerance 2017    Asthma     bronchitis    Cancer ([address] 1998    Left breast cancer    Cervical radiculopathy     Cigarette nicotine dependence in remission 2016    DDD (degenerative disc disease), lumbar     Degeneration of cervical intervertebral disc     Degeneration of thoracic intervertebral disc     Diabetes mellitus ([address]     type 2    Diabetes mellitus type 2, controlled ([address] 2016    Essential hypertension with goal blood pressure less than 140/90 8/10/2016    GERD (gastroesophageal reflux disease)     GI bleed 16    Gout     H. pylori infection 2016    H/O: GI bleed 2017    Hammer toe of left foot     Hypercholesterolemia     Hypertension     Lumbago with sciatica     Lumbar radiculopathy     Malignant neoplasm of left female breast ([address] 2016    Mixed incontinence 8/10/2016    Obesity, Class I, BMI 30-34.9 8/2/2016    osteoarthritis     Osteoarthrosis, generalized, involving multiple sites     Osteoporosis     Paronychia of finger     Primary insomnia 8/10/2016    Pure hypercholesterolemia 2/9/2017    Sciatica of left side     Scoliosis     Segmental and somatic dysfunction of lumbar region     Segmental and somatic dysfunction of pelvic region     Urinary tract infection     Vertigo     Vitamin B12 deficiency     Vitamin D deficiency     Wound infection      Past Surgical History:   Procedure Laterality Date    HX MASTECTOMY Left 1998    HX TUBAL LIGATION Bilateral       reports that she quit smoking about 41 years ago. She has a 1.25 pack-year smoking history. She has never used smokeless tobacco. She reports that she does not drink alcohol or use illicit drugs. Family History   Problem Relation Age of Onset    Hypertension Mother     Hypertension Father      Allergies   Allergen Reactions    Nsaids (Non-Steroidal Anti-Inflammatory Drug) Other (comments)     GI Bleed    Aspirin Other (comments)     Gi bleed       Review of Systems   Positives in bold  CV:      chest pain, palpitations  PULM:  SOB, wheezing, cough, sputum production    Current Outpatient Meds and Allergies     Current Outpatient Prescriptions on File Prior to Visit   Medication Sig Dispense Refill    ferrous sulfate 325 mg (65 mg iron) tablet Take 1 Tab by mouth two (2) times daily (with meals). 60 Tab 2    oxyCODONE-acetaminophen (PERCOCET 7.5) 7.5-325 mg per tablet Take 1-2 Tabs by mouth every four (4) hours as needed. Max Daily Amount: 12 Tabs. 60 Tab 0    warfarin (COUMADIN) 3 mg tablet Take 1 Tab by mouth daily for 30 days. (Patient taking differently: Take 1 Tab by mouth daily.  TAKE COUMADIN 6 MG TONIGHT 2/27/17  COUMADIN 4.5 MG ON TUESDAY 2/28/17   COUMADIN 3 MG ON WEDNESDAY 3/1/17   CHECK ON THURSDAY 3/2/17) 30 Tab 0    LORazepam (ATIVAN) 1 mg tablet Take 1 Tab by mouth every eight (8) hours as needed. Max Daily Amount: 3 mg. Indications: ANXIETY 30 Tab 0    valsartan-hydroCHLOROthiazide (DIOVAN-HCT) 160-12.5 mg per tablet Take 1 Tab by mouth daily. 90 Tab 3    metFORMIN (GLUCOPHAGE) 500 mg tablet Take 1.5 Tabs by mouth two (2) times daily (with meals). (Patient taking differently: Take 500 mg by mouth two (2) times daily (with meals). ) 180 Tab 3    CRANBERRY FRUIT EXTRACT (CRANBERRY CONCENTRATE PO) Take 2 Tabs by mouth daily.  amLODIPine (NORVASC) 10 mg tablet Take 1 Tab by mouth daily. 90 Tab 3    metoprolol succinate (TOPROL-XL) 50 mg XL tablet Take 1 Tab by mouth daily. 90 Tab 3    febuxostat (ULORIC) 40 mg tab tablet Take 1 Tab by mouth daily. 90 Tab 3    fexofenadine (ALLEGRA) 180 mg tablet Take 1 Tab by mouth daily. (Patient taking differently: Take 180 mg by mouth daily as needed.) 90 Tab 3    colchicine 0.6 mg tablet Take 1 Tab by mouth daily. (Patient taking differently: Take 1 Tab by mouth daily. prn  Indications: GOUT PREVENTION) 90 Tab 0    oxybutynin chloride XL (DITROPAN XL) 10 mg CR tablet Take 1 Tab by mouth daily. 90 Tab 3    melatonin 3 mg tablet Take 1 Tab by mouth nightly. 90 Tab 3    pravastatin (PRAVACHOL) 20 mg tablet Take 1 Tab by mouth nightly. 90 Tab 3    pantoprazole (PROTONIX) 40 mg tablet Take 40 mg by mouth daily as needed.  senna (SENOKOT) 8.6 mg tablet Take 2 Tabs by mouth nightly. No current facility-administered medications on file prior to visit.       Allergies   Allergen Reactions    Nsaids (Non-Steroidal Anti-Inflammatory Drug) Other (comments)     GI Bleed    Aspirin Other (comments)     Gi bleed       Objective:     VS:    Visit Vitals    /64 (BP 1 Location: Right arm, BP Patient Position: Sitting)    Pulse 88    Temp 98.9 °F (37.2 °C) (Oral)    Resp 18    Ht 5' 4\" (1.626 m)    Wt 180 lb 3.2 oz (81.7 kg)    SpO2 97%    BMI 30.93 kg/m2     General:   Well-nourished, well-groomed, pleasant, alert, in no acute distress  Head:  Normocephalic, atraumatic  Ears:  External ears WNL  Nose:  External nares WNL  Psych:  No pressured speech, no abnormal thought content    Office Visit on 02/23/2017   Component Date Value Ref Range Status    Color (UA POC) 02/23/2017 Yellow   Final    Clarity (UA POC) 02/23/2017 Clear   Final    Glucose (UA POC) 02/23/2017 Negative  Negative Final    Bilirubin (UA POC) 02/23/2017 1+  Negative Final    Ketones (UA POC) 02/23/2017 Negative  Negative Final    Specific gravity (UA POC) 02/23/2017 1.015  1.001 - 1.035 Final    Blood (UA POC) 02/23/2017 Negative  Negative Final    pH (UA POC) 02/23/2017 5.5  4.6 - 8.0 Final    Protein (UA POC) 02/23/2017 1+  Negative mg/dL Final    Urobilinogen (UA POC) 02/23/2017 0.2 mg/dL  0.2 - 1 Final    Nitrites (UA POC) 02/23/2017 Negative  Negative Final    Leukocyte esterase (UA POC) 02/23/2017 Negative  Negative Final   Home Care Visit on 02/20/2017   Component Date Value Ref Range Status    Prothrombin time (POC) 02/20/2017 19.7  sec Final    INR POC 02/20/2017 1.6* 0.9 - 1.1 Final   Admission on 02/14/2017, Discharged on 02/17/2017   Component Date Value Ref Range Status    Glucose (POC) 02/14/2017 98  70 - 110 mg/dL Final    Comment: (NOTE)  The FDA has indicated that no capillary point of care blood glucose   monitoring systems are approved for use in \"critically ill\" patients,   however they have not defined this population. The College of   American Pathologists has recommended that these devices should not   be used in cases such as severe hypotension, dehydration, shock, and   hyperglycemic-hyperosmolar state, amongst others. Venous or arterial   collection is the recommended specimen for testing these patients.       Glucose (POC) 02/14/2017 120* 70 - 110 mg/dL Final    Comment: (NOTE)  The FDA has indicated that no capillary point of care blood glucose   monitoring systems are approved for use in \"critically ill\" patients,   however they have not defined this population. The College of   American Pathologists has recommended that these devices should not   be used in cases such as severe hypotension, dehydration, shock, and   hyperglycemic-hyperosmolar state, amongst others. Venous or arterial   collection is the recommended specimen for testing these patients.  Hemoglobin A1c 02/14/2017 6.7* 4.2 - 5.6 % Final    Comment: (NOTE)  HbA1C Interpretive Ranges  <5.7              Normal  5.7 - 6.4         Consider Prediabetes  >6.5              Consider Diabetes      Est. average glucose 02/14/2017 146  mg/dL Final    Comment: (NOTE)  The eAG should be interpreted with patient characteristics in mind   since ethnicity, interindividual differences, red cell lifespan,   variation in rates of glycation, etc. may affect the validity of the   calculation.  Glucose (POC) 02/14/2017 138* 70 - 110 mg/dL Final    Comment: (NOTE)  The FDA has indicated that no capillary point of care blood glucose   monitoring systems are approved for use in \"critically ill\" patients,   however they have not defined this population. The College of   American Pathologists has recommended that these devices should not   be used in cases such as severe hypotension, dehydration, shock, and   hyperglycemic-hyperosmolar state, amongst others. Venous or arterial   collection is the recommended specimen for testing these patients.  Glucose (POC) 02/14/2017 220* 70 - 110 mg/dL Final    Comment: (NOTE)  The FDA has indicated that no capillary point of care blood glucose   monitoring systems are approved for use in \"critically ill\" patients,   however they have not defined this population. The College of   American Pathologists has recommended that these devices should not   be used in cases such as severe hypotension, dehydration, shock, and   hyperglycemic-hyperosmolar state, amongst others.   Venous or arterial   collection is the recommended specimen for testing these patients.  Glucose (POC) 02/14/2017 201* 70 - 110 mg/dL Final    Comment: (NOTE)  The FDA has indicated that no capillary point of care blood glucose   monitoring systems are approved for use in \"critically ill\" patients,   however they have not defined this population. The College of   American Pathologists has recommended that these devices should not   be used in cases such as severe hypotension, dehydration, shock, and   hyperglycemic-hyperosmolar state, amongst others. Venous or arterial   collection is the recommended specimen for testing these patients.  Glucose (POC) 02/14/2017 206* 70 - 110 mg/dL Final    Comment: (NOTE)  The FDA has indicated that no capillary point of care blood glucose   monitoring systems are approved for use in \"critically ill\" patients,   however they have not defined this population. The College of   American Pathologists has recommended that these devices should not   be used in cases such as severe hypotension, dehydration, shock, and   hyperglycemic-hyperosmolar state, amongst others. Venous or arterial   collection is the recommended specimen for testing these patients.       Sodium 02/15/2017 139  136 - 145 mmol/L Final    Potassium 02/15/2017 3.5  3.5 - 5.5 mmol/L Final    Chloride 02/15/2017 102  100 - 108 mmol/L Final    CO2 02/15/2017 28  21 - 32 mmol/L Final    Anion gap 02/15/2017 9  3.0 - 18 mmol/L Final    Glucose 02/15/2017 135* 74 - 99 mg/dL Final    BUN 02/15/2017 16  7.0 - 18 MG/DL Final    Creatinine 02/15/2017 0.96  0.6 - 1.3 MG/DL Final    BUN/Creatinine ratio 02/15/2017 17  12 - 20   Final    GFR est AA 02/15/2017 >60  >60 ml/min/1.73m2 Final    GFR est non-AA 02/15/2017 58* >60 ml/min/1.73m2 Final    Comment: (NOTE)  Estimated GFR is calculated using the Modification of Diet in Renal   Disease (MDRD) Study equation, reported for both  Americans   (GFRAA) and non- Americans (GFRNA), and normalized to 1.73m2   body surface area. The physician must decide which value applies to   the patient. The MDRD study equation should only be used in   individuals age 25 or older. It has not been validated for the   following: pregnant women, patients with serious comorbid conditions,   or on certain medications, or persons with extremes of body size,   muscle mass, or nutritional status.  Calcium 02/15/2017 8.3* 8.5 - 10.1 MG/DL Final    WBC 02/15/2017 6.9  4.6 - 13.2 K/uL Final    RBC 02/15/2017 3.47* 4.20 - 5.30 M/uL Final    HGB 02/15/2017 10.3* 12.0 - 16.0 g/dL Final    HCT 02/15/2017 31.3* 35.0 - 45.0 % Final    MCV 02/15/2017 90.2  74.0 - 97.0 FL Final    MCH 02/15/2017 29.7  24.0 - 34.0 PG Final    MCHC 02/15/2017 32.9  31.0 - 37.0 g/dL Final    RDW 02/15/2017 14.5  11.6 - 14.5 % Final    PLATELET 12/65/9064 617  135 - 420 K/uL Final    MPV 02/15/2017 11.2  9.2 - 11.8 FL Final    NEUTROPHILS 02/15/2017 62  40 - 73 % Final    LYMPHOCYTES 02/15/2017 28  21 - 52 % Final    MONOCYTES 02/15/2017 10  3 - 10 % Final    EOSINOPHILS 02/15/2017 0  0 - 5 % Final    BASOPHILS 02/15/2017 0  0 - 2 % Final    ABS. NEUTROPHILS 02/15/2017 4.3  1.8 - 8.0 K/UL Final    ABS. LYMPHOCYTES 02/15/2017 1.9  0.9 - 3.6 K/UL Final    ABS. MONOCYTES 02/15/2017 0.7  0.05 - 1.2 K/UL Final    ABS. EOSINOPHILS 02/15/2017 0.0  0.0 - 0.4 K/UL Final    ABS.  BASOPHILS 02/15/2017 0.0  0.0 - 0.1 K/UL Final    DF 02/15/2017 AUTOMATED    Final    Prothrombin time 02/15/2017 16.0* 11.5 - 15.2 sec Final    INR 02/15/2017 1.3* 0.8 - 1.2   Final    Comment:            INR Therapeutic Ranges         (on stable oral anticoagulant):     INDICATION                INR  DVT/PE/Atrial Fib          2.0-3.0  MI/Mechanical Heart Valve  2.5-3.5      Glucose (POC) 02/15/2017 112* 70 - 110 mg/dL Final    Comment: (NOTE)  The FDA has indicated that no capillary point of care blood glucose   monitoring systems are approved for use in \"critically ill\" patients,   however they have not defined this population. The College of   American Pathologists has recommended that these devices should not   be used in cases such as severe hypotension, dehydration, shock, and   hyperglycemic-hyperosmolar state, amongst others. Venous or arterial   collection is the recommended specimen for testing these patients.  Glucose (POC) 02/15/2017 111* 70 - 110 mg/dL Final    Comment: (NOTE)  The FDA has indicated that no capillary point of care blood glucose   monitoring systems are approved for use in \"critically ill\" patients,   however they have not defined this population. The College of   American Pathologists has recommended that these devices should not   be used in cases such as severe hypotension, dehydration, shock, and   hyperglycemic-hyperosmolar state, amongst others. Venous or arterial   collection is the recommended specimen for testing these patients.  Glucose (POC) 02/15/2017 186* 70 - 110 mg/dL Final    Comment: (NOTE)  The FDA has indicated that no capillary point of care blood glucose   monitoring systems are approved for use in \"critically ill\" patients,   however they have not defined this population. The College of   American Pathologists has recommended that these devices should not   be used in cases such as severe hypotension, dehydration, shock, and   hyperglycemic-hyperosmolar state, amongst others. Venous or arterial   collection is the recommended specimen for testing these patients.  Glucose (POC) 02/15/2017 125* 70 - 110 mg/dL Final    Comment: (NOTE)  The FDA has indicated that no capillary point of care blood glucose   monitoring systems are approved for use in \"critically ill\" patients,   however they have not defined this population.  The College of   American Pathologists has recommended that these devices should not   be used in cases such as severe hypotension, dehydration, shock, and   hyperglycemic-hyperosmolar state, amongst others. Venous or arterial   collection is the recommended specimen for testing these patients.  Glucose (POC) 02/15/2017 137* 70 - 110 mg/dL Final    Comment: (NOTE)  The FDA has indicated that no capillary point of care blood glucose   monitoring systems are approved for use in \"critically ill\" patients,   however they have not defined this population. The College of   American Pathologists has recommended that these devices should not   be used in cases such as severe hypotension, dehydration, shock, and   hyperglycemic-hyperosmolar state, amongst others. Venous or arterial   collection is the recommended specimen for testing these patients.       Sodium 02/16/2017 138  136 - 145 mmol/L Final    Potassium 02/16/2017 3.8  3.5 - 5.5 mmol/L Final    Chloride 02/16/2017 100  100 - 108 mmol/L Final    CO2 02/16/2017 30  21 - 32 mmol/L Final    Anion gap 02/16/2017 8  3.0 - 18 mmol/L Final    Glucose 02/16/2017 177* 74 - 99 mg/dL Final    BUN 02/16/2017 18  7.0 - 18 MG/DL Final    Creatinine 02/16/2017 0.98  0.6 - 1.3 MG/DL Final    BUN/Creatinine ratio 02/16/2017 18  12 - 20   Final    GFR est AA 02/16/2017 >60  >60 ml/min/1.73m2 Final    GFR est non-AA 02/16/2017 57* >60 ml/min/1.73m2 Final    Calcium 02/16/2017 8.5  8.5 - 10.1 MG/DL Final    WBC 02/16/2017 6.6  4.6 - 13.2 K/uL Final    RBC 02/16/2017 3.47* 4.20 - 5.30 M/uL Final    HGB 02/16/2017 10.2* 12.0 - 16.0 g/dL Final    HCT 02/16/2017 31.1* 35.0 - 45.0 % Final    MCV 02/16/2017 89.6  74.0 - 97.0 FL Final    MCH 02/16/2017 29.4  24.0 - 34.0 PG Final    MCHC 02/16/2017 32.8  31.0 - 37.0 g/dL Final    RDW 02/16/2017 14.3  11.6 - 14.5 % Final    PLATELET 08/21/1526 895  135 - 420 K/uL Final    MPV 02/16/2017 11.4  9.2 - 11.8 FL Final    NEUTROPHILS 02/16/2017 72  40 - 73 % Final    LYMPHOCYTES 02/16/2017 18* 21 - 52 % Final    MONOCYTES 02/16/2017 9  3 - 10 % Final    EOSINOPHILS 02/16/2017 1  0 - 5 % Final    BASOPHILS 02/16/2017 0  0 - 2 % Final    ABS. NEUTROPHILS 02/16/2017 4.7  1.8 - 8.0 K/UL Final    ABS. LYMPHOCYTES 02/16/2017 1.2  0.9 - 3.6 K/UL Final    ABS. MONOCYTES 02/16/2017 0.6  0.05 - 1.2 K/UL Final    ABS. EOSINOPHILS 02/16/2017 0.1  0.0 - 0.4 K/UL Final    ABS. BASOPHILS 02/16/2017 0.0  0.0 - 0.1 K/UL Final    DF 02/16/2017 AUTOMATED    Final    Prothrombin time 02/16/2017 18.7* 11.5 - 15.2 sec Final    INR 02/16/2017 1.6* 0.8 - 1.2   Final    Comment:            INR Therapeutic Ranges         (on stable oral anticoagulant):     INDICATION                INR  DVT/PE/Atrial Fib          2.0-3.0  MI/Mechanical Heart Valve  2.5-3.5      Glucose (POC) 02/16/2017 158* 70 - 110 mg/dL Final    Comment: (NOTE)  The FDA has indicated that no capillary point of care blood glucose   monitoring systems are approved for use in \"critically ill\" patients,   however they have not defined this population. The College of   [employer]ologists has recommended that these devices should not   be used in cases such as severe hypotension, dehydration, shock, and   hyperglycemic-hyperosmolar state, amongst others. Venous or arterial   collection is the recommended specimen for testing these patients.  Glucose (POC) 02/16/2017 135* 70 - 110 mg/dL Final    Comment: (NOTE)  The FDA has indicated that no capillary point of care blood glucose   monitoring systems are approved for use in \"critically ill\" patients,   however they have not defined this population. The College of   American [employer]ologists has recommended that these devices should not   be used in cases such as severe hypotension, dehydration, shock, and   hyperglycemic-hyperosmolar state, amongst others. Venous or arterial   collection is the recommended specimen for testing these patients.       Color 02/16/2017 YELLOW    Final    Appearance 02/16/2017 CLEAR    Final    Specific gravity 02/16/2017 <1.005* 1.005 - 1.030 Final    pH (UA) 02/16/2017 5.0  5.0 - 8.0 Final    Protein 02/16/2017 NEGATIVE   NEG mg/dL Final    Glucose 02/16/2017 NEGATIVE   NEG mg/dL Final    Ketone 02/16/2017 NEGATIVE   NEG mg/dL Final    Bilirubin 02/16/2017 NEGATIVE   NEG   Final    Blood 02/16/2017 NEGATIVE   NEG   Final    Urobilinogen 02/16/2017 0.2  0.2 - 1.0 EU/dL Final    Nitrites 02/16/2017 NEGATIVE   NEG   Final    Leukocyte Esterase 02/16/2017 NEGATIVE   NEG   Final    Glucose (POC) 02/16/2017 180* 70 - 110 mg/dL Final    Comment: (NOTE)  The FDA has indicated that no capillary point of care blood glucose   monitoring systems are approved for use in \"critically ill\" patients,   however they have not defined this population. The College of   American [employer]ologists has recommended that these devices should not   be used in cases such as severe hypotension, dehydration, shock, and   hyperglycemic-hyperosmolar state, amongst others. Venous or arterial   collection is the recommended specimen for testing these patients.  Glucose (POC) 02/16/2017 141* 70 - 110 mg/dL Final    Comment: (NOTE)  The FDA has indicated that no capillary point of care blood glucose   monitoring systems are approved for use in \"critically ill\" patients,   however they have not defined this population. The College of   [employer]ologists has recommended that these devices should not   be used in cases such as severe hypotension, dehydration, shock, and   hyperglycemic-hyperosmolar state, amongst others. Venous or arterial   collection is the recommended specimen for testing these patients.       Sodium 02/17/2017 138  136 - 145 mmol/L Final    Potassium 02/17/2017 3.7  3.5 - 5.5 mmol/L Final    Chloride 02/17/2017 101  100 - 108 mmol/L Final    CO2 02/17/2017 30  21 - 32 mmol/L Final    Anion gap 02/17/2017 7  3.0 - 18 mmol/L Final    Glucose 02/17/2017 147* 74 - 99 mg/dL Final    BUN 02/17/2017 13  7.0 - 18 MG/DL Final    Creatinine 02/17/2017 0.89  0.6 - 1.3 MG/DL Final    BUN/Creatinine ratio 02/17/2017 15  12 - 20   Final    GFR est AA 02/17/2017 >60  >60 ml/min/1.73m2 Final    GFR est non-AA 02/17/2017 >60  >60 ml/min/1.73m2 Final    Calcium 02/17/2017 8.5  8.5 - 10.1 MG/DL Final    WBC 02/17/2017 7.1  4.6 - 13.2 K/uL Final    RBC 02/17/2017 3.28* 4.20 - 5.30 M/uL Final    HGB 02/17/2017 9.5* 12.0 - 16.0 g/dL Final    HCT 02/17/2017 29.4* 35.0 - 45.0 % Final    MCV 02/17/2017 89.6  74.0 - 97.0 FL Final    MCH 02/17/2017 29.0  24.0 - 34.0 PG Final    MCHC 02/17/2017 32.3  31.0 - 37.0 g/dL Final    RDW 02/17/2017 14.5  11.6 - 14.5 % Final    PLATELET 72/88/0094 140  135 - 420 K/uL Final    MPV 02/17/2017 10.9  9.2 - 11.8 FL Final    NEUTROPHILS 02/17/2017 59  40 - 73 % Final    LYMPHOCYTES 02/17/2017 24  21 - 52 % Final    MONOCYTES 02/17/2017 16* 3 - 10 % Final    EOSINOPHILS 02/17/2017 1  0 - 5 % Final    BASOPHILS 02/17/2017 0  0 - 2 % Final    ABS. NEUTROPHILS 02/17/2017 4.1  1.8 - 8.0 K/UL Final    ABS. LYMPHOCYTES 02/17/2017 1.7  0.9 - 3.6 K/UL Final    ABS. MONOCYTES 02/17/2017 1.2  0.05 - 1.2 K/UL Final    ABS. EOSINOPHILS 02/17/2017 0.1  0.0 - 0.4 K/UL Final    ABS. BASOPHILS 02/17/2017 0.0  0.0 - 0.06 K/UL Final    DF 02/17/2017 AUTOMATED    Final    Prothrombin time 02/17/2017 23.0* 11.5 - 15.2 sec Final    INR 02/17/2017 2.2* 0.8 - 1.2   Final    Comment:            INR Therapeutic Ranges         (on stable oral anticoagulant):     INDICATION                INR  DVT/PE/Atrial Fib          2.0-3.0  MI/Mechanical Heart Valve  2.5-3.5      Glucose (POC) 02/17/2017 138* 70 - 110 mg/dL Final    Comment: (NOTE)  The FDA has indicated that no capillary point of care blood glucose   monitoring systems are approved for use in \"critically ill\" patients,   however they have not defined this population.  The College of   [employer] has recommended that these devices should not   be used in cases such as severe hypotension, dehydration, shock, and hyperglycemic-hyperosmolar state, amongst others. Venous or arterial   collection is the recommended specimen for testing these patients.  Glucose (POC) 02/17/2017 162* 70 - 110 mg/dL Final    Comment: (NOTE)  The FDA has indicated that no capillary point of care blood glucose   monitoring systems are approved for use in \"critically ill\" patients,   however they have not defined this population. The [employer] of   [employer] has recommended that these devices should not   be used in cases such as severe hypotension, dehydration, shock, and   hyperglycemic-hyperosmolar state, amongst others. Venous or arterial   collection is the recommended specimen for testing these patients. Hospital Outpatient Visit on 02/08/2017   Component Date Value Ref Range Status    Albumin 02/08/2017 3.6  3.4 - 5.0 g/dL Final    Prothrombin time 02/08/2017 12.5  11.5 - 15.2 sec Final    INR 02/08/2017 1.0  0.8 - 1.2   Final    Comment:            INR Therapeutic Ranges         (on stable oral anticoagulant):     INDICATION                INR  DVT/PE/Atrial Fib          2.0-3.0  MI/Mechanical Heart Valve  2.5-3.5      aPTT 02/08/2017 26.9  23.0 - 36.4 SEC Final    WBC 02/08/2017 4.6  4.6 - 13.2 K/uL Final    RBC 02/08/2017 4.42  4.20 - 5.30 M/uL Final    HGB 02/08/2017 13.1  12.0 - 16.0 g/dL Final    HCT 02/08/2017 39.8  35.0 - 45.0 % Final    MCV 02/08/2017 90.0  74.0 - 97.0 FL Final    MCH 02/08/2017 29.6  24.0 - 34.0 PG Final    MCHC 02/08/2017 32.9  31.0 - 37.0 g/dL Final    RDW 02/08/2017 14.7* 11.6 - 14.5 % Final    PLATELET 79/59/5922 532  135 - 420 K/uL Final    MPV 02/08/2017 11.7  9.2 - 11.8 FL Final    NEUTROPHILS 02/08/2017 47  40 - 73 % Final    LYMPHOCYTES 02/08/2017 37  21 - 52 % Final    MONOCYTES 02/08/2017 13* 3 - 10 % Final    EOSINOPHILS 02/08/2017 3  0 - 5 % Final    BASOPHILS 02/08/2017 0  0 - 2 % Final    ABS. NEUTROPHILS 02/08/2017 2.1  1.8 - 8.0 K/UL Final    ABS. LYMPHOCYTES 02/08/2017 1.7  0.9 - 3.6 K/UL Final    ABS. MONOCYTES 02/08/2017 0.6  0.05 - 1.2 K/UL Final    ABS. EOSINOPHILS 02/08/2017 0.1  0.0 - 0.4 K/UL Final    ABS. BASOPHILS 02/08/2017 0.0  0.0 - 0.1 K/UL Final    DF 02/08/2017 AUTOMATED    Final    Sodium 02/08/2017 141  136 - 145 mmol/L Final    Potassium 02/08/2017 4.3  3.5 - 5.5 mmol/L Final    Chloride 02/08/2017 101  100 - 108 mmol/L Final    CO2 02/08/2017 32  21 - 32 mmol/L Final    Anion gap 02/08/2017 8  3.0 - 18 mmol/L Final    Glucose 02/08/2017 120* 74 - 99 mg/dL Final    BUN 02/08/2017 23* 7.0 - 18 MG/DL Final    Creatinine 02/08/2017 1.09  0.6 - 1.3 MG/DL Final    BUN/Creatinine ratio 02/08/2017 21* 12 - 20   Final    GFR est AA 02/08/2017 >60  >60 ml/min/1.73m2 Final    GFR est non-AA 02/08/2017 50* >60 ml/min/1.73m2 Final    Comment: (NOTE)  Estimated GFR is calculated using the Modification of Diet in Renal   Disease (MDRD) Study equation, reported for both  Americans   (GFRAA) and non- Americans (GFRNA), and normalized to 1.73m2   body surface area. The physician must decide which value applies to   the patient. The MDRD study equation should only be used in   individuals age 25 or older. It has not been validated for the   following: pregnant women, patients with serious comorbid conditions,   or on certain medications, or persons with extremes of body size,   muscle mass, or nutritional status.       Calcium 02/08/2017 9.7  8.5 - 10.1 MG/DL Final    Color 02/08/2017 YELLOW    Final    Appearance 02/08/2017 CLEAR    Final    Specific gravity 02/08/2017 1.011  1.005 - 1.030   Final    pH (UA) 02/08/2017 6.5  5.0 - 8.0   Final    Protein 02/08/2017 NEGATIVE   NEG mg/dL Final    Glucose 02/08/2017 NEGATIVE   NEG mg/dL Final    Ketone 02/08/2017 NEGATIVE   NEG mg/dL Final    Bilirubin 02/08/2017 NEGATIVE   NEG   Final    Blood 02/08/2017 NEGATIVE   NEG   Final    Urobilinogen 02/08/2017 0.2  0.2 - 1.0 EU/dL Final    Nitrites 02/08/2017 NEGATIVE   NEG   Final    Leukocyte Esterase 02/08/2017 SMALL* NEG   Final    Special Requests: 02/08/2017 NO SPECIAL REQUESTS    Final    Culture result: 02/08/2017     Final                    Value:[id number]  COLONIES/mL  MIXED GRAM POSITIVE SAAD, PROBABLE SKIN/GENITAL CONTAMINATION.  Crossmatch Expiration 02/08/2017 02/16/2017   Final    ABO/Rh(D) 02/08/2017 O POSITIVE   Final    Antibody screen 02/08/2017 NEG   Final    Hemoglobin A1c 02/08/2017 7.2* 4.2 - 5.6 % Final    Comment: (NOTE)  HbA1C Interpretive Ranges  <5.7              Normal  5.7 - 6.4         Consider Prediabetes  >6.5              Consider Diabetes      Est. average glucose 02/08/2017 160  mg/dL Final    Comment: (NOTE)  The eAG should be interpreted with patient characteristics in mind   since ethnicity, interindividual differences, red cell lifespan,   variation in rates of glycation, etc. may affect the validity of the   calculation.  WBC 02/08/2017 0 to 2  0 - 4 /hpf Final    RBC 02/08/2017 0 to 2  0 - 5 /hpf Final    Epithelial cells 02/08/2017 FEW  0 - 5 /lpf Final    Yeast 02/08/2017 FEW* NEG   Final   Hospital Outpatient Visit on 01/16/2017   Component Date Value Ref Range Status    Hemoglobin A1c 01/16/2017 7.6* 4.2 - 5.6 % Final    Comment: (NOTE)  HbA1C Interpretive Ranges  <5.7              Normal  5.7 - 6.4         Consider Prediabetes  >6.5              Consider Diabetes      Est. average glucose 01/16/2017 171  mg/dL Final    Comment: (NOTE)  The eAG should be interpreted with patient characteristics in mind   since ethnicity, interindividual differences, red cell lifespan,   variation in rates of glycation, etc. may affect the validity of the   calculation.       Sodium 01/16/2017 141  136 - 145 mmol/L Final    Potassium 01/16/2017 3.9  3.5 - 5.5 mmol/L Final    Chloride 01/16/2017 100  100 - 108 mmol/L Final    CO2 01/16/2017 34* 21 - 32 mmol/L Final    Anion gap 01/16/2017 7  3.0 - 18 mmol/L Final    Glucose 01/16/2017 122* 74 - 99 mg/dL Final    BUN 01/16/2017 15  7.0 - 18 MG/DL Final    Creatinine 01/16/2017 1.13  0.6 - 1.3 MG/DL Final    BUN/Creatinine ratio 01/16/2017 13  12 - 20   Final    GFR est AA 01/16/2017 58* >60 ml/min/1.73m2 Final    GFR est non-AA 01/16/2017 48* >60 ml/min/1.73m2 Final    Comment: (NOTE)  Estimated GFR is calculated using the Modification of Diet in Renal   Disease (MDRD) Study equation, reported for both  Americans   (GFRAA) and non- Americans (GFRNA), and normalized to 1.73m2   body surface area. The physician must decide which value applies to   the patient. The MDRD study equation should only be used in   individuals age 25 or older. It has not been validated for the   following: pregnant women, patients with serious comorbid conditions,   or on certain medications, or persons with extremes of body size,   muscle mass, or nutritional status.  Calcium 01/16/2017 9.5  8.5 - 10.1 MG/DL Final    Bilirubin, total 01/16/2017 0.6  0.2 - 1.0 MG/DL Final    ALT (SGPT) 01/16/2017 18  13 - 56 U/L Final    AST (SGOT) 01/16/2017 22  15 - 37 U/L Final    Alk.  phosphatase 01/16/2017 58  45 - 117 U/L Final    Protein, total 01/16/2017 7.8  6.4 - 8.2 g/dL Final    Albumin 01/16/2017 3.7  3.4 - 5.0 g/dL Final    Globulin 01/16/2017 4.1* 2.0 - 4.0 g/dL Final    A-G Ratio 01/16/2017 0.9  0.8 - 1.7   Final   Hospital Outpatient Visit on 12/16/2016   Component Date Value Ref Range Status    Ventricular Rate 12/16/2016 56  BPM Final    Atrial Rate 12/16/2016 56  BPM Final    P-R Interval 12/16/2016 184  ms Final    QRS Duration 12/16/2016 84  ms Final    Q-T Interval 12/16/2016 430  ms Final    QTC Calculation (Bezet) 12/16/2016 414  ms Final    Calculated P Axis 12/16/2016 77  degrees Final    Calculated R Axis 12/16/2016 20  degrees Final    Calculated T Axis 12/16/2016 97  degrees Final    Diagnosis 12/16/2016 Final                    Value:Sinus bradycardia  Nonspecific T wave abnormality  Abnormal ECG  No previous ECGs available  Confirmed by Eddie Arteaga ([id number]) on 12/16/2016 4:08:53 PM     Hospital Outpatient Visit on 12/16/2016   Component Date Value Ref Range Status    Prothrombin time 12/16/2016 12.6  11.5 - 15.2 sec Final    INR 12/16/2016 1.0  0.8 - 1.2   Final    Comment:            INR Therapeutic Ranges         (on stable oral anticoagulant):     INDICATION                INR  DVT/PE/Atrial Fib          2.0-3.0  MI/Mechanical Heart Valve  2.5-3.5      aPTT 12/16/2016 28.0  23.0 - 36.4 SEC Final    WBC 12/16/2016 5.0  4.6 - 13.2 K/uL Final    RBC 12/16/2016 4.95  4.20 - 5.30 M/uL Final    HGB 12/16/2016 14.0  12.0 - 16.0 g/dL Final    HCT 12/16/2016 43.3  35.0 - 45.0 % Final    MCV 12/16/2016 87.5  74.0 - 97.0 FL Final    MCH 12/16/2016 28.3  24.0 - 34.0 PG Final    MCHC 12/16/2016 32.3  31.0 - 37.0 g/dL Final    RDW 12/16/2016 16.8* 11.6 - 14.5 % Final    PLATELET 50/33/0565 939  135 - 420 K/uL Final    MPV 12/16/2016 11.1  9.2 - 11.8 FL Final    NEUTROPHILS 12/16/2016 46  40 - 73 % Final    LYMPHOCYTES 12/16/2016 43  21 - 52 % Final    MONOCYTES 12/16/2016 8  3 - 10 % Final    EOSINOPHILS 12/16/2016 3  0 - 5 % Final    BASOPHILS 12/16/2016 0  0 - 2 % Final    ABS. NEUTROPHILS 12/16/2016 2.2  1.8 - 8.0 K/UL Final    ABS. LYMPHOCYTES 12/16/2016 2.2  0.9 - 3.6 K/UL Final    ABS. MONOCYTES 12/16/2016 0.4  0.05 - 1.2 K/UL Final    ABS. EOSINOPHILS 12/16/2016 0.2  0.0 - 0.4 K/UL Final    ABS.  BASOPHILS 12/16/2016 0.0  0.0 - 0.06 K/UL Final    DF 12/16/2016 AUTOMATED    Final    Sodium 12/16/2016 142  136 - 145 mmol/L Final    Potassium 12/16/2016 4.2  3.5 - 5.5 mmol/L Final    Chloride 12/16/2016 100  100 - 108 mmol/L Final    CO2 12/16/2016 34* 21 - 32 mmol/L Final    Anion gap 12/16/2016 8  3.0 - 18 mmol/L Final    Glucose 12/16/2016 132* 74 - 99 mg/dL Final    BUN 12/16/2016 17 7.0 - 18 MG/DL Final    Creatinine 12/16/2016 0.99  0.6 - 1.3 MG/DL Final    BUN/Creatinine ratio 12/16/2016 17  12 - 20   Final    GFR est AA 12/16/2016 >60  >60 ml/min/1.73m2 Final    GFR est non-AA 12/16/2016 56* >60 ml/min/1.73m2 Final    Comment: (NOTE)  Estimated GFR is calculated using the Modification of Diet in Renal   Disease (MDRD) Study equation, reported for both  Americans   (GFRAA) and non- Americans (GFRNA), and normalized to 1.73m2   body surface area. The physician must decide which value applies to   the patient. The MDRD study equation should only be used in   individuals age 25 or older. It has not been validated for the   following: pregnant women, patients with serious comorbid conditions,   or on certain medications, or persons with extremes of body size,   muscle mass, or nutritional status.  Calcium 12/16/2016 9.2  8.5 - 10.1 MG/DL Final    Bilirubin, total 12/16/2016 0.6  0.2 - 1.0 MG/DL Final    ALT (SGPT) 12/16/2016 19  13 - 56 U/L Final    AST (SGOT) 12/16/2016 22  15 - 37 U/L Final    Alk.  phosphatase 12/16/2016 74  45 - 117 U/L Final    Protein, total 12/16/2016 8.0  6.4 - 8.2 g/dL Final    Albumin 12/16/2016 3.7  3.4 - 5.0 g/dL Final    Globulin 12/16/2016 4.3* 2.0 - 4.0 g/dL Final    A-G Ratio 12/16/2016 0.9  0.8 - 1.7   Final    Color 12/16/2016 YELLOW    Final    Appearance 12/16/2016 CLEAR    Final    Specific gravity 12/16/2016 1.010  1.005 - 1.030   Final    pH (UA) 12/16/2016 7.0  5.0 - 8.0   Final    Protein 12/16/2016 NEGATIVE   NEG mg/dL Final    Glucose 12/16/2016 NEGATIVE   NEG mg/dL Final    Ketone 12/16/2016 NEGATIVE   NEG mg/dL Final    Bilirubin 12/16/2016 NEGATIVE   NEG   Final    Blood 12/16/2016 NEGATIVE   NEG   Final    Urobilinogen 12/16/2016 0.2  0.2 - 1.0 EU/dL Final    Nitrites 12/16/2016 NEGATIVE   NEG   Final    Leukocyte Esterase 12/16/2016 TRACE* NEG   Final    Special Requests: 12/16/2016 NO SPECIAL REQUESTS Final    Culture result: 12/16/2016 NO GROWTH 2 DAYS    Final    WBC 12/16/2016 0 to 3  0 - 4 /hpf Final    RBC 12/16/2016 0  0 - 5 /hpf Final    Epithelial cells 12/16/2016 1+  0 - 5 /lpf Final    Bacteria 12/16/2016 NEGATIVE   NEG /hpf Final    Other: 12/16/2016 STARCH GRANULES 2+   Final     Assessment/Plan & Orders:         ICD-10-CM ICD-9-CM    1. Arthritis of knee M19.90 716.96    2. Controlled type 2 diabetes mellitus without complication, without long-term current use of insulin (HCC) E11.9 250.00    3. Essential hypertension with goal blood pressure less than 140/90 I10 401.9    4. Urinary pain R30.9 788.1 AMB POC URINALYSIS DIP STICK AUTO W/O MICRO   5. Obesity, Class I, BMI 30-34.9 E66.9 278.00      Healthy lifestyle has been encouraged including avoidance of tobacco, limiting or avoiding alcohol intake, heart healthy diet which is low in cholesterol and saturated fat and contains fresh fruits, vegetables and whole grains and fiber, regular exercise with goals of 20-30 minutes 3-5 days weekly and maintaining an optimal BMI. Pt to referred for eye exam. Eye form given  Follow up with physical therapy, orthopedics    ASA or antiplatelet therapy not prescibed for patient reasons. Follow-up Disposition:  Return in about 3 months (around 5/23/2017) for Follow up hypertension, Follow up diabetes mellitus, Follow up hyperlipidemia. *Patient verbalized understanding and agreement with the plan. Patient was given an after-visit summary. Ayush Ac.  [address], MD - Internal Medicine  3/1/2017, 12:25 PM  [facility]  [address] [place], [address]  Phone [phone]  Fax [phone]

## 2017-02-23 NOTE — PROGRESS NOTES
Mic Cooper is a 79 y.o. female presents today for transition of care. Patient reports her morning glucose was 138. Patient reports having some lower abdominal pressure and pain when she stands. Patient reports that she was on several antibiotics before surgery. Pt is in Room # 3      Fall Risk Screening: Completed    1. Have you been to the ER, urgent care clinic since your last visit? Hospitalized since your last visit? Yes When: 2/14/17 Where: [facility] Reason for visit: right total knee replacement    2. Have you seen or consulted any other health care providers outside of the [address] [place] [address] since your last visit? Include any pap smears or colon screening.  No        reviewed:

## 2017-02-24 ENCOUNTER — HOME CARE VISIT (OUTPATIENT)
Dept: SCHEDULING | Facility: HOME HEALTH | Age: 67
End: 2017-02-24
Payer: MEDICARE

## 2017-02-24 PROCEDURE — G0157 HHC PT ASSISTANT EA 15: HCPCS

## 2017-02-25 ENCOUNTER — HOME CARE VISIT (OUTPATIENT)
Dept: SCHEDULING | Facility: HOME HEALTH | Age: 67
End: 2017-02-25
Payer: MEDICARE

## 2017-02-25 VITALS
SYSTOLIC BLOOD PRESSURE: 140 MMHG | OXYGEN SATURATION: 98 % | HEART RATE: 86 BPM | TEMPERATURE: 98.9 F | DIASTOLIC BLOOD PRESSURE: 80 MMHG | RESPIRATION RATE: 18 BRPM

## 2017-02-25 PROCEDURE — G0157 HHC PT ASSISTANT EA 15: HCPCS

## 2017-02-25 PROCEDURE — G0299 HHS/HOSPICE OF RN EA 15 MIN: HCPCS

## 2017-02-26 ENCOUNTER — HOME CARE VISIT (OUTPATIENT)
Dept: SCHEDULING | Facility: HOME HEALTH | Age: 67
End: 2017-02-26
Payer: MEDICARE

## 2017-02-26 VITALS
DIASTOLIC BLOOD PRESSURE: 80 MMHG | SYSTOLIC BLOOD PRESSURE: 124 MMHG | OXYGEN SATURATION: 96 % | OXYGEN SATURATION: 93 % | HEART RATE: 74 BPM | DIASTOLIC BLOOD PRESSURE: 70 MMHG | HEART RATE: 76 BPM | SYSTOLIC BLOOD PRESSURE: 130 MMHG

## 2017-02-26 VITALS — DIASTOLIC BLOOD PRESSURE: 82 MMHG | OXYGEN SATURATION: 99 % | SYSTOLIC BLOOD PRESSURE: 140 MMHG | HEART RATE: 78 BPM

## 2017-02-26 PROCEDURE — G0157 HHC PT ASSISTANT EA 15: HCPCS

## 2017-02-27 ENCOUNTER — HOME CARE VISIT (OUTPATIENT)
Dept: HOME HEALTH SERVICES | Facility: HOME HEALTH | Age: 67
End: 2017-02-27
Payer: MEDICARE

## 2017-02-27 ENCOUNTER — HOME CARE VISIT (OUTPATIENT)
Dept: SCHEDULING | Facility: HOME HEALTH | Age: 67
End: 2017-02-27
Payer: MEDICARE

## 2017-02-27 ENCOUNTER — TELEPHONE (OUTPATIENT)
Dept: ORTHOPEDIC SURGERY | Age: 67
End: 2017-02-27

## 2017-02-27 VITALS
DIASTOLIC BLOOD PRESSURE: 66 MMHG | SYSTOLIC BLOOD PRESSURE: 128 MMHG | TEMPERATURE: 98.9 F | HEART RATE: 74 BPM | OXYGEN SATURATION: 97 %

## 2017-02-27 VITALS — SYSTOLIC BLOOD PRESSURE: 158 MMHG | DIASTOLIC BLOOD PRESSURE: 90 MMHG | OXYGEN SATURATION: 94 % | HEART RATE: 73 BPM

## 2017-02-27 VITALS
DIASTOLIC BLOOD PRESSURE: 82 MMHG | TEMPERATURE: 98.7 F | SYSTOLIC BLOOD PRESSURE: 138 MMHG | HEART RATE: 70 BPM | OXYGEN SATURATION: 96 %

## 2017-02-27 LAB
INR BLD: 15.8 (ref 0.9–1.1)
PT POC: 1.3 SEC

## 2017-02-27 PROCEDURE — G0299 HHS/HOSPICE OF RN EA 15 MIN: HCPCS

## 2017-02-27 PROCEDURE — G0151 HHCP-SERV OF PT,EA 15 MIN: HCPCS

## 2017-02-27 NOTE — TELEPHONE ENCOUNTER
PT: 15.8  INR: 1.3  3mg at home  1.5 Coumadin 2/26/17    Per KAYLYNN Hein verbal order given to Ana:  6mg today  4.5mg Tuesday  3mg Wednesday    Repeat INR Thursday

## 2017-02-28 ENCOUNTER — HOME CARE VISIT (OUTPATIENT)
Dept: SCHEDULING | Facility: HOME HEALTH | Age: 67
End: 2017-02-28
Payer: MEDICARE

## 2017-02-28 PROCEDURE — G0157 HHC PT ASSISTANT EA 15: HCPCS

## 2017-03-01 ENCOUNTER — HOME CARE VISIT (OUTPATIENT)
Dept: SCHEDULING | Facility: HOME HEALTH | Age: 67
End: 2017-03-01
Payer: MEDICARE

## 2017-03-01 VITALS — DIASTOLIC BLOOD PRESSURE: 80 MMHG | SYSTOLIC BLOOD PRESSURE: 142 MMHG | HEART RATE: 81 BPM | OXYGEN SATURATION: 98 %

## 2017-03-01 PROCEDURE — G0157 HHC PT ASSISTANT EA 15: HCPCS

## 2017-03-02 ENCOUNTER — OFFICE VISIT (OUTPATIENT)
Dept: ORTHOPEDIC SURGERY | Facility: CLINIC | Age: 67
End: 2017-03-02

## 2017-03-02 ENCOUNTER — HOME CARE VISIT (OUTPATIENT)
Dept: SCHEDULING | Facility: HOME HEALTH | Age: 67
End: 2017-03-02
Payer: MEDICARE

## 2017-03-02 ENCOUNTER — HOME CARE VISIT (OUTPATIENT)
Dept: HOME HEALTH SERVICES | Facility: HOME HEALTH | Age: 67
End: 2017-03-02
Payer: MEDICARE

## 2017-03-02 ENCOUNTER — TELEPHONE (OUTPATIENT)
Dept: ORTHOPEDIC SURGERY | Age: 67
End: 2017-03-02

## 2017-03-02 VITALS
TEMPERATURE: 98.5 F | SYSTOLIC BLOOD PRESSURE: 146 MMHG | HEART RATE: 76 BPM | DIASTOLIC BLOOD PRESSURE: 84 MMHG | RESPIRATION RATE: 18 BRPM | OXYGEN SATURATION: 99 %

## 2017-03-02 VITALS
HEART RATE: 82 BPM | TEMPERATURE: 98.6 F | SYSTOLIC BLOOD PRESSURE: 161 MMHG | HEIGHT: 64 IN | WEIGHT: 180 LBS | BODY MASS INDEX: 30.73 KG/M2 | DIASTOLIC BLOOD PRESSURE: 75 MMHG

## 2017-03-02 VITALS — HEART RATE: 87 BPM | DIASTOLIC BLOOD PRESSURE: 72 MMHG | SYSTOLIC BLOOD PRESSURE: 128 MMHG | OXYGEN SATURATION: 96 %

## 2017-03-02 DIAGNOSIS — Z96.651 STATUS POST RIGHT KNEE REPLACEMENT: Primary | ICD-10-CM

## 2017-03-02 LAB
INR, POC: 1.6 (ref 0.7–1.2)
PROTHROMBIN TIME, POC: 18.6 SECONDS (ref 6.5–11.9)

## 2017-03-02 PROCEDURE — G0157 HHC PT ASSISTANT EA 15: HCPCS

## 2017-03-02 PROCEDURE — G0300 HHS/HOSPICE OF LPN EA 15 MIN: HCPCS

## 2017-03-02 PROCEDURE — G0494 LPN CARE EA 15MIN HH/HOSPICE: HCPCS

## 2017-03-02 RX ORDER — OXYCODONE AND ACETAMINOPHEN 7.5; 325 MG/1; MG/1
1-2 TABLET ORAL
Qty: 60 TAB | Refills: 0 | Status: SHIPPED | OUTPATIENT
Start: 2017-03-02 | End: 2017-03-15 | Stop reason: SDUPTHER

## 2017-03-02 NOTE — PATIENT INSTRUCTIONS
..DO:  1:  Sit with the leg out straight 5-10 min every hour while awake. Keep the toes pointed       up towards the ivy. 2.  Bend your knee 5-10 min every hour. Bend it to the point of pain and hold it for 5-10       Min. 3.  ICE your knee 20 min every hour    DO NOT:    1. Do not place anything under your knee to prop it up  2. Do not sit in the recliner chair.

## 2017-03-02 NOTE — PROGRESS NOTES
[address] [facility], [address] [facility]  [phone]           Patient: Celestino Mcburney                MRN: [id number]       SSN: [id number]  YOB: 1950        AGE: 79 y.o. SEX: female  Body mass index is 30.9 kg/(m^2). PCP: Gia Hills MD  03/02/17      This office note has been dictated. REVIEW OF SYSTEMS:  Constitutional: Negative for fever, chills, weight loss and malaise/fatigue. HENT: Negative. Eyes: Negative. Respiratory: Negative. Cardiovascular: Negative. Gastrointestinal: No bowel incontinence or constipation. Genitourinary: No bladder incontinence or saddle anesthesia. Skin: Negative. Neurological: Negative. Endo/Heme/Allergies: Negative. Psychiatric/Behavioral: Negative. Musculoskeletal: As per HPI above.      Past Medical History:   Diagnosis Date    Advance directive discussed with patient 8/17/2016    Aspirin intolerance 1/26/2017    Asthma     bronchitis    Cancer ([address] 1998    Left breast cancer    Cervical radiculopathy     Cigarette nicotine dependence in remission 8/17/2016    DDD (degenerative disc disease), lumbar     Degeneration of cervical intervertebral disc     Degeneration of thoracic intervertebral disc     Diabetes mellitus ([address]     type 2    Diabetes mellitus type 2, controlled ([address] 8/2/2016    Essential hypertension with goal blood pressure less than 140/90 8/10/2016    GERD (gastroesophageal reflux disease)     GI bleed 6/29/16    Gout     H. pylori infection 7/11/2016    H/O: GI bleed 1/26/2017    Hammer toe of left foot     Hypercholesterolemia     Hypertension     Lumbago with sciatica     Lumbar radiculopathy     Malignant neoplasm of left female breast ([address] 8/2/2016    Mixed incontinence 8/10/2016    Obesity, Class I, BMI 30-34.9 8/2/2016    osteoarthritis     Osteoarthrosis, generalized, involving multiple sites     Osteoporosis     Paronychia of finger     Primary insomnia 8/10/2016    Pure hypercholesterolemia 2/9/2017    Sciatica of left side     Scoliosis     Segmental and somatic dysfunction of lumbar region     Segmental and somatic dysfunction of pelvic region     Urinary tract infection     Vertigo     Vitamin B12 deficiency     Vitamin D deficiency     Wound infection          Current Outpatient Prescriptions:     oxyCODONE-acetaminophen (PERCOCET 7.5) 7.5-325 mg per tablet, Take 1-2 Tabs by mouth every four (4) hours as needed. Max Daily Amount: 12 Tabs., Disp: 60 Tab, Rfl: 0    indomethacin (INDOCIN) 50 mg capsule, TK ONE C PO TID, Disp: , Rfl: 3    ferrous sulfate 325 mg (65 mg iron) tablet, Take 1 Tab by mouth two (2) times daily (with meals). , Disp: 60 Tab, Rfl: 2    warfarin (COUMADIN) 3 mg tablet, Take 1 Tab by mouth daily for 30 days. (Patient taking differently: Take 1 Tab by mouth daily. TAKE COUMADIN 6 MG TONIGHT 2/27/17 COUMADIN 4.5 MG ON TUESDAY 2/28/17  COUMADIN 3 MG ON WEDNESDAY 3/1/17  CHECK ON THURSDAY 3/2/17), Disp: 30 Tab, Rfl: 0    LORazepam (ATIVAN) 1 mg tablet, Take 1 Tab by mouth every eight (8) hours as needed. Max Daily Amount: 3 mg. Indications: ANXIETY, Disp: 30 Tab, Rfl: 0    valsartan-hydroCHLOROthiazide (DIOVAN-HCT) 160-12.5 mg per tablet, Take 1 Tab by mouth daily. , Disp: 90 Tab, Rfl: 3    metFORMIN (GLUCOPHAGE) 500 mg tablet, Take 1.5 Tabs by mouth two (2) times daily (with meals). (Patient taking differently: Take 500 mg by mouth two (2) times daily (with meals). ), Disp: 180 Tab, Rfl: 3    CRANBERRY FRUIT EXTRACT (CRANBERRY CONCENTRATE PO), Take 2 Tabs by mouth daily. , Disp: , Rfl:     amLODIPine (NORVASC) 10 mg tablet, Take 1 Tab by mouth daily. , Disp: 90 Tab, Rfl: 3    metoprolol succinate (TOPROL-XL) 50 mg XL tablet, Take 1 Tab by mouth daily. , Disp: 90 Tab, Rfl: 3    febuxostat (ULORIC) 40 mg tab tablet, Take 1 Tab by mouth daily. , Disp: 90 Tab, Rfl: 3    fexofenadine (ALLEGRA) 180 mg tablet, Take 1 Tab by mouth daily. (Patient taking differently: Take 180 mg by mouth daily as needed.), Disp: 90 Tab, Rfl: 3    colchicine 0.6 mg tablet, Take 1 Tab by mouth daily. (Patient taking differently: Take 1 Tab by mouth daily. prn  Indications: GOUT PREVENTION), Disp: 90 Tab, Rfl: 0    oxybutynin chloride XL (DITROPAN XL) 10 mg CR tablet, Take 1 Tab by mouth daily. , Disp: 90 Tab, Rfl: 3    melatonin 3 mg tablet, Take 1 Tab by mouth nightly., Disp: 90 Tab, Rfl: 3    pravastatin (PRAVACHOL) 20 mg tablet, Take 1 Tab by mouth nightly., Disp: 90 Tab, Rfl: 3    pantoprazole (PROTONIX) 40 mg tablet, Take 40 mg by mouth daily as needed. , Disp: , Rfl:     senna (SENOKOT) 8.6 mg tablet, Take 2 Tabs by mouth nightly., Disp: , Rfl:     Allergies   Allergen Reactions    Nsaids (Non-Steroidal Anti-Inflammatory Drug) Other (comments)     GI Bleed    Aspirin Other (comments)     Gi bleed         Social History     Social History    Marital status:      Spouse name: N/A    Number of children: N/A    Years of education: N/A     Occupational History    Not on file. Social History Main Topics    Smoking status: Former Smoker     Packs/day: 0.25     Years: 5.00     Quit date: 1/1/1976    Smokeless tobacco: Never Used      Comment: 4 cigarettes per day    Alcohol use No    Drug use: No    Sexual activity: Not Currently     Partners: Male     Birth control/ protection: None     Other Topics Concern    Not on file     Social History Narrative       Past Surgical History:   Procedure Laterality Date    HX MASTECTOMY Left 1998    HX TUBAL LIGATION Bilateral              We did see Ms. Kramer for follow-up in regards to her right total knee replacement. The patient is now 16 days status post surgery, and she is progressing well. She is at home with home health physical therapy. Her son is present with her today.  He does state that she has been noncompliant with working on her range of motion activities. She has had no troubles with the wound. She has had no recent fevers, chills, systemic changes, or injuries to report. Her pain is well controlled. She denies any chest pain or shortness of breath. PHYSICAL EXAMINATION: In general the patient is alert and oriented x 3 and is in no acute distress. The patient is well-developed and well-nourished with a normal affect. The patient is afebrile. Examination of the right knee reveals the skin to be intact. The surgical wound has healed up very nicely. Staples are in place. There is no erythema or ecchymosis. There is no warmth. There is minimal swelling. There is negative calf tenderness. There is negative Homans. There is no evidence of DVT noted. Range of motion is approximately -8° to 95°. Patella tracks nicely without rubs or crepitus. Stability is quite good. ASSESSMENT: Status post right total knee replacement. PLAN:  At this point the staples were removed today and replaced with Steri-Strips today in the office without complications. I had a reginaldo discussion with the patient about range of motion activities on her own on an hourly basis with both flexion and extension, which were demonstrated for the patient today in the office. She will be set up with outpatient physical therapy. We will plan on seeing her back in two weeks time for reevaluation. She will call with any questions or concerns that shall arise.                     JR Hayder ALVARADO, PETER, ATC

## 2017-03-02 NOTE — TELEPHONE ENCOUNTER
PT- 18.6  INR- 1.6  Coumadin   6 mg on Monday 02/27/17  4.5 mg on Tuesday 02/28/17  3 mg on Wednesday 03/01/17    Discharging pt today 03/02/17 Out pt Therapy starts Monday 03/06/17.      Cookie Hooper wants to know f/u dosing and orders    Ana number [phone]

## 2017-03-03 ENCOUNTER — HOME CARE VISIT (OUTPATIENT)
Dept: SCHEDULING | Facility: HOME HEALTH | Age: 67
End: 2017-03-03
Payer: MEDICARE

## 2017-03-03 PROCEDURE — G0151 HHCP-SERV OF PT,EA 15 MIN: HCPCS

## 2017-03-06 ENCOUNTER — HOME CARE VISIT (OUTPATIENT)
Dept: SCHEDULING | Facility: HOME HEALTH | Age: 67
End: 2017-03-06
Payer: MEDICARE

## 2017-03-06 ENCOUNTER — HOSPITAL ENCOUNTER (OUTPATIENT)
Dept: PHYSICAL THERAPY | Age: 67
Discharge: HOME | End: 2017-03-06
Payer: MEDICARE

## 2017-03-06 PROCEDURE — 97530 THERAPEUTIC ACTIVITIES: CPT

## 2017-03-06 PROCEDURE — G8978 MOBILITY CURRENT STATUS: HCPCS

## 2017-03-06 PROCEDURE — 97162 PT EVAL MOD COMPLEX 30 MIN: CPT

## 2017-03-06 PROCEDURE — G8979 MOBILITY GOAL STATUS: HCPCS

## 2017-03-06 NOTE — PROGRESS NOTES
PHYSICAL THERAPY - DAILY TREATMENT NOTE    Patient Name: Mic Cooper        Date: 3/6/2017  : 1950   YES Patient  Verified  Visit #:     Insurance: Payor: Severiano Hussar / Plan: SANDRAANAI MONROY MEDICARE COMPLETE / Product Type: Managed Care Medicare /      In time: 9:20 Out time: 10:00   Total Treatment Time: 40     Medicare Time Tracking (below)   Total Timed Codes (min):  40 1:1 Treatment Time:  40     TREATMENT AREA =  R TKA    SUBJECTIVE    Pain Level (on 0 to 10 scale):  3  / 10   Medication Changes/New allergies or changes in medical history, any new surgeries or procedures? NO    If yes, update Summary List   Subjective Functional Status/Changes:  []  No changes reported     States R TKA was performed on 2017. [facility] she was in the hospital for 3 days and was then discharged home. [facility] home health physical therapy came for 2 weeks. [facility] she is currently living with son in a 1 story home with 2 KAYLIN with no HR's. [facility] she she is able to negotiate steps independently with nonreciprocal gait pattern. [facility] her permanent residence is in [place] with her  who has had a CVA. [facility] she is the primary caregiver for her . [facility] her  is with her in [place] at this time. [facility] she is planning on returning to [place] [facility] later this month.           OBJECTIVE  Physical Therapy Evaluation - Knee        Gait:  [] Normal    [] Abnormal    [x] Antalgic    [] NWB    Device:none (states she forgot her QC today)    Describe: decreased R knee flexion/extension during gait cycle; decreased TKE and heel strike on R LE, decreased stride length    ROM / Strength  [] Unable to assess                  AROM                      PROM                   Strength (1-5)    Left Right Left Right Left Right   Hip Flexion     4 4    Extension     2 2    Abduction     3 2    Adduction         Knee Flexion 120 90  100 4 4    Extension -5 20   4 4   Ankle Plantarflexion Dorsiflexion             Flexibility: [] Unable to assess at this time  Hamstrings:    (L) Tightness= [x] WNL   [] Min   [] Mod   [] Severe    (R) Tightness= [x] WNL   [] Min   [] Mod   [] Severe  Quadriceps:    (L) Tightness= [] WNL   [] Min   [] Mod   [] Severe    (R) Tightness= [] WNL   [] Min   [] Mod   [] Severe  Gastroc:      (L) Tightness= [] WNL   [] Min   [] Mod   [] Severe    (R) Tightness= [] WNL   [] Min   [] Mod   [] Severe  Other:    Optional Tests:  Patellar Positioning (Static)   []L []R Normal []L []R Lateral   []L []R [place]      []L []R Medial   []L []R B[place]    Patellar Tracking   []L []R Glide (Lat)   []L []R Tilt (Lat)     []L []R Glide (Med)  []L []R Tilt (Med)      []L []R Tile (Inf)     Patellar Mobility   []L []R Hypermobile []L [x]R Hypomobile         Girth Measurements:     Cm at  Cm above joint line   Cm at   Cm below joint line  Cm at joint line   Left        Right           Lachmans  [] Neg    [] Pos Posterior Drawer [] Neg    [] Pos  Pivot Shift  [] Neg    [] Pos Posterior Sag  [] Neg    [] Pos  NADIA   [] Neg    [] Pos Carl's Test [] Neg    [] Pos  ALRI   [] Neg    [] Pos Squat   [] Neg    [] Pos  Valgus@ 0 Degrees [] Neg    [] Pos Godwin-Mahendra [] Neg    [] Pos  Valgus@ 30 Degrees [] Neg    [] Pos Patellar Apprehension [] Neg    [] Pos  Varus@ 0 Degrees [] Neg    [] Pos Chris's Compression [] Neg    [] Pos  Varus@ 30 Degrees [] Neg    [] Pos Ely's Test  [] Neg    [] Pos  Apley's Compression [] Neg    [] Pos Geovanna's Test  [] Neg    [] Pos  Apley's Distraction [] Neg    [] Pos Stroke Test  [] Neg    [] Pos   Anterior Drawer [] Neg    [] Pos Fluctuation Test [] Neg    [] Pos  Other:                  [] Neg    [] Pos                     Modalities Rationale:        min [] Estim, type/location:                                      []  att     []  unatt     []  w/US     []  w/ice    []  w/heat    min []  Mechanical Traction: type/lbs                   []  pro   []  sup   []  int   [] cont    []  before manual    []  after manual    min []  Ultrasound, settings/location:      min []  Iontophoresis w/ dexamethasone, location:                                               []  take home patch       []  in clinic    min []  Ice     []  Heat    location/position:     min []  Vasopneumatic Device, press/temp:     min []  Other:    [] Skin assessment post-treatment (if applicable):    []  intact    []  redness- no adverse reaction     []redness  adverse reaction:       min Patient Education:  YES  Reviewed HEP   []  Progressed/Changed HEP based on:   Cont current HEP from HHPT     Other Objective/Functional Measures:    See eval   FOTO = 53    steristrips intact over incision on R knee. Edema present in entire R LE. Patient wearing Kostas hose on B LE's. Post Treatment Pain Level (on 0 to 10) scale:   3  / 10     ASSESSMENT    Assessment/Changes in Function:     See eval    Justification for Eval Code Complexity:  Patient History (low 0, mod 1-2, high 3-4): mod (HTN, DM)  Examination (low 1-2, mod 3+, high 4+): mod (see above)  Clinical Presentation (low stable or uncomplicated, mod evolving or changing, high unstable or unpredictable): mod  Clinical Decision Making (low , mod 26-74, high 1-25): FOTO =53 mod     []  See Progress Note/Recertification   Patient will continue to benefit from skilled PT services to modify and progress therapeutic interventions, address functional mobility deficits, address ROM deficits, address strength deficits, analyze and address soft tissue restrictions, analyze and cue movement patterns, analyze and modify body mechanics/ergonomics, assess and modify postural abnormalities, address imbalance/dizziness and instruct in home and community integration to attain remaining goals. Progress toward goals / Updated goals:    Goals established.        PLAN    [x]  Upgrade activities as tolerated YES Continue plan of care   []  Discharge due to :    []  Other: Therapist: Albina Keller, PT    Date: 3/6/2017 Time: 9:23 AM     Future Appointments  Date Time Provider Royce Flores   3/8/2017 To Be Determined Victorina Skelton, RN [address] [facility]

## 2017-03-06 NOTE — PROGRESS NOTES
[address]  [facility] PHYSICAL THERAPY  [address], [address] - Phone: [phone]  Fax: [phone] / [address] [address] PHYSICAL THERAPY SERVICES  Patient Name: Poli Crump : 1950   Medical   Diagnosis: Right knee pain [M25.561] Treatment Diagnosis: R TKA    Onset Date: 2017 (DOS)     Referral Source: Eden Epstein MD Start of Care [facility]): 3/6/2017   Prior Hospitalization: See medical history Provider #: [id number]   Prior Level of Function: Ambulating without AD prior to R TKA   Comorbidities: DM, HTN, h/o breast CA   Medications: Verified on Patient Summary List   The Plan of Care and following information is based on the information from the initial evaluation.   ===========================================================================================  Assessment / key information:  Patient is a 79y.o. year old female who presents to [facility] s/p R TKR with DOS 2017. Patient is currenty ambulating without an assistive device in the clinic, but states she uses a QC at home. ROM and strength as below. She demonstrates the following gait deviations: decreased R knee flexion/extension during gait cycle; decreased TKE and heel strike on R LE, decreased stride length. Patient with a Functional Status score of 53 on FOTO (Focused on Therapeutic Outcomes), which corresponds to a functional limitation of 47%. Patient will benefit from skilled physical therapy services to address these issues.     ROM / Strength  [] Unable to assess                  AROM                   PROM                    Strength (1-5)      Left Right Left Right Left Right   Hip Flexion         4 4     Extension         2 2     Abduction         3 2     Adduction               Knee Flexion 120 90   100 4 4     Extension -5 20    15 4 4 ===========================================================================================  Eval Complexity: History: MEDIUM  Complexity : 1-2 comorbidities / personal factors will impact the outcome/ POC Exam:MEDIUM Complexity : 3 Standardized tests and measures addressing body structure, function, activity limitation and / or participation in recreation  Presentation: MEDIUM Complexity : Evolving with changing characteristics  Clinical Decision Making:MEDIUM Complexity : FOTO score of 26-74Overall Complexity:MEDIUM    Problem List: pain affecting function, decrease ROM, decrease strength, edema affecting function, impaired gait/ balance, decrease ADL/ functional abilitiies, decrease activity tolerance, decrease flexibility/ joint mobility and decrease transfer abilities   Treatment Plan may include any combination of the following: Therapeutic exercise, Therapeutic activities, Neuromuscular re-education, Physical agent/modality, Gait/balance training, Manual therapy and Patient education  Patient / Family readiness to learn indicated by: asking questions, trying to perform skills and interest  Persons(s) to be included in education: patient (P)  Barriers to Learning/Limitations: None  Measures taken:    Patient Goal (s): \"That I will be able to do what I used to do for myself\"   Patient self reported health status: good  Rehabilitation Potential: good   Short Term Goals: To be accomplished in  2-3  weeks:  1) Establish HEP to prevent further disability. 2) Patient will improve R knee AROM flexion to >100 degrees for increases ease with ADL's.    3) Patient will improve R knee AROM extension to <10 degrees to improve heel/toe gait pattern with ambulation.  Long Term Goals:  To be accomplished in  4-6  weeks:  1) Patient to be independent & compliant with HEP in preparation for D/C.  2) Patient will improve R knee AROM flexion to >110 degrees to increase ease with ADL's.    3) Improve strength of R hip abduction to 4/5 with no c/o pain to increase dynamic stability with gait. 4) Patient will improve R knee AROM extension to < 5 degrees to facilitate normal gait. 5)  Patient will increase FOTO Functional Status score to 62 to indicate decreased functional limitations. Frequency / Duration:   Patient to be seen  3  times per week for 3-6  weeks:  *Patient reports she plans on returning to [place] before the end of March 2017. Patient / Caregiver education and instruction: self care  G-Codes (GP): Mobility [id number] Current  CK= 40-59%  [id number] Goal  CJ= 20-39%. The severity rating is based on the FOTO Score  Therapist Signature: Edis Mccarthy PT Date: 9/1/0379   Certification Period: 3/6/2017 - 6/5/2017 Time: 9:59 AM   ===========================================================================================  I certify that the above Physical Therapy Services are being furnished while the patient is under my care. I agree with the treatment plan and certify that this therapy is necessary. Physician Signature:        Date:       Time:     Please sign and return to [facility] or you may fax the signed copy to [phone]. Thank you.

## 2017-03-07 VITALS
SYSTOLIC BLOOD PRESSURE: 130 MMHG | DIASTOLIC BLOOD PRESSURE: 80 MMHG | HEART RATE: 76 BPM | OXYGEN SATURATION: 98 % | RESPIRATION RATE: 17 BRPM | TEMPERATURE: 98.7 F

## 2017-03-08 ENCOUNTER — HOSPITAL ENCOUNTER (OUTPATIENT)
Dept: PHYSICAL THERAPY | Age: 67
Discharge: HOME | End: 2017-03-08
Payer: MEDICARE

## 2017-03-08 PROCEDURE — 97140 MANUAL THERAPY 1/> REGIONS: CPT

## 2017-03-08 PROCEDURE — 97110 THERAPEUTIC EXERCISES: CPT

## 2017-03-08 NOTE — PROGRESS NOTES
PHYSICAL THERAPY - DAILY TREATMENT NOTE    Patient Name: Gina Nguyen        Date: 3/8/2017  : 1950   YES Patient  Verified  Visit #:      of     Insurance: Payor: Maikol Diamond / Plan: SANDRAANAI MONROY MEDICARE COMPLETE / Product Type: Managed Care Medicare /      In time: 10:35 Out time: 11:20   Total Treatment Time: 45     Medicare Time Tracking (below)   Total Timed Codes (min):  45 1:1 Treatment Time:  35     TREATMENT AREA =  R TKA    SUBJECTIVE    Pain Level (on 0 to 10 scale):  2  / 10   Medication Changes/New allergies or changes in medical history, any new surgeries or procedures? NO    If yes, update Summary List   Subjective Functional Status/Changes:  []  No changes reported     Patient with no new c/o's. OBJECTIVE    Modalities Rationale:     Decrease edema   min [] Estim, type/location:                                      []  att     []  unatt     []  w/US     []  w/ice    []  w/heat    min []  Mechanical Traction: type/lbs                   []  pro   []  sup   []  int   []  cont    []  before manual    []  after manual    min []  Ultrasound, settings/location:      min []  Iontophoresis w/ dexamethasone, location:                                               []  take home patch       []  in clinic   10 min [x]  Ice     []  Heat    location/position: supine    min []  Vasopneumatic Device, press/temp:     min []  Other:    [x] Skin assessment post-treatment (if applicable):    [x]  intact    []  redness- no adverse reaction     []redness  adverse reaction:      27 min Therapeutic Exercise:  [x]  See flow sheet   Rationale:      increase ROM and increase strength to improve the patients ability to perform ADL's, gait, and functional mobility with increased ease and decreased pain. 8 min Manual Therapy: Grade 2-3 ant/post tibial glides; gentle PROM for R knee flexion/extension.    Rationale:      decrease pain, increase ROM and increase tissue extensibility to improve patient's ability to perform ADL's, gait, and functional mobility with increased ease and decreased pain. Other Objective/Functional Measures:    ~ 20 minutes into treatment patient reported dizziness. States she took her BP medicine at a different time today than usual.    BP = 148/86, HR 80 bpm, O2 sat rate= 94%  Patient reported she would like to continue with treatment although she looked like she did not feel well. No further exercises were performed - performed manual therapy and CP to R knee. Standing exercises were held today. R knee AROM extension = 15 degrees. Post Treatment Pain Level (on 0 to 10) scale:   3  / 10     ASSESSMENT    Assessment/Changes in Function:     Patient with c/o's dizziness after supine to sit transfer (see above). []  See Progress Note/Recertification   Patient will continue to benefit from skilled PT services to modify and progress therapeutic interventions, address functional mobility deficits, address ROM deficits, address strength deficits, analyze and address soft tissue restrictions, analyze and cue movement patterns, analyze and modify body mechanics/ergonomics, assess and modify postural abnormalities, address imbalance/dizziness and instruct in home and community integration to attain remaining goals. Progress toward goals / Updated goals: · Short Term Goals: To be accomplished in 2-3 weeks:  1) Establish HEP to prevent further disability. No HEP given due to patient's c/o's today. 2) Patient will improve R knee AROM flexion to >100 degrees for increases ease with ADL's. NT  3) Patient will improve R knee AROM extension to <10 degrees to improve heel/toe gait pattern with ambulation.  R knee AROM extension = 15 degrees     PLAN    [x]  Upgrade activities as tolerated YES Continue plan of care   []  Discharge due to :    []  Other:      Therapist: Joe Ricardo PT    Date: 3/8/2017 Time: 10:58 AM     Future Appointments  Date Time Provider Royce Sandra   3/10/2017 9:30 AM Erjamar Bigfarhat, [facility]   3/13/2017 9:30 AM Reena [facility]   3/15/2017 10:30 AM Ezzard Crimes, [facility]   3/17/2017 9:30 AM Erling Bigness, [facility]   3/20/2017 9:30 AM Erjamar Bigfarhat, [facility]   3/22/2017 9:00 AM Ezzard Crimes, [facility]   3/24/2017 9:30 AM Erjamar Bigfarhat, [facility]   3/27/2017 9:30 AM Reena [facility]   3/29/2017 10:30 AM Ezzard Crimes, [facility]   3/31/2017 9:30 AM Napoleon Bigfarhat, [facility]

## 2017-03-10 ENCOUNTER — HOSPITAL ENCOUNTER (OUTPATIENT)
Dept: PHYSICAL THERAPY | Age: 67
Discharge: HOME | End: 2017-03-10
Payer: MEDICARE

## 2017-03-10 PROCEDURE — 97530 THERAPEUTIC ACTIVITIES: CPT

## 2017-03-10 PROCEDURE — 97110 THERAPEUTIC EXERCISES: CPT

## 2017-03-10 PROCEDURE — 97140 MANUAL THERAPY 1/> REGIONS: CPT

## 2017-03-10 NOTE — PROGRESS NOTES
PHYSICAL THERAPY - DAILY TREATMENT NOTE    Patient Name: Poli Crump        Date: 3/10/2017  : 1950   yes Patient  Verified  Visit #:   3    Insurance: Payor: Eliane Kelly / Plan: BSANAI MONROY MEDICARE COMPLETE / Product Type: Managed Care Medicare /      In time: 727 Out time:    Total Treatment Time: 70     Medicare Time Tracking (below)   Total Timed Codes (min):  60 1:1 Treatment Time:  40     TREATMENT AREA =  Right knee pain [M25.561]    SUBJECTIVE  Pain Level (on 0 to 10 scale):  1  / 10   Medication Changes/New allergies or changes in medical history, any new surgeries or procedures?    no  If yes, update Summary List   Subjective Functional Status/Changes:  []  No changes reported     \"I  need to work on walking.  I am doing my HEP\"         OBJECTIVE  Modalities Rationale:     decrease inflammation and decrease pain to improve patient's ability to  perform ADLs/prolong stding and amb/stairs with ease    min [] Estim, type/location:                                      []  att     []  unatt     []  w/US     []  w/ice    []  w/heat    min []  Mechanical Traction: type/lbs                   []  pro   []  sup   []  int   []  cont    []  before manual    []  after manual    min []  Ultrasound, settings/location:      min []  Iontophoresis w/ dexamethasone, location:                                               []  take home patch       []  in clinic   10 min [x]  Ice     []  Heat    location/position: supine with elevation with 1/2 wedge under ankle for TKE    min []  Vasopneumatic Device, press/temp:     min []  Other:    [x] Skin assessment post-treatment (if applicable):    [x]  intact    []  redness- no adverse reaction     []redness  adverse reaction:        44 min Therapeutic Exercise:  [x]  See flow sheet   Rationale:      increase ROM, increase strength and improve coordination to improve the patients ability to  perform ADLs/prolong stding and amb/stairs with ease 8 min Manual Therapy: patella mobs, STM to med/lat knee, and PROM flex/ext   Rationale:      decrease pain, increase ROM, increase tissue extensibility, decrease trigger points and increase postural awareness to improve patient's ability to  perform ADLs/prolong stding and amb/stairs with ease         8 min Therapeutic Activity: forced R sit<>std transfer, knee ext str for TKE during gait   Rationale:    increase ROM and increase strength to improve the patients ability to  perform ADLs/prolong stding and amb/stairs with ease        min Patient Education:  yes  Reviewed HEP   []  Progressed/Changed HEP based on:  Pt ed on importance and benefits of compliance with HEP, core strength/stability and proper posture; pt verbalized understanding       Other Objective/Functional Measures:    VCs + demo to perform proper technique for TE  initiated forced sit<>std to promote symmetrical WBing with transfers  demos decrease knee ext/flex, and ankle Df/PF during gait, improved with VCs  R knee flex  AAROM~90 degs   Post Treatment Pain Level (on 0 to 10) scale:   0  / 10     ASSESSMENT  Assessment/Changes in Function:     Progressed there-ex without c/o increase p!  demos decrease WBing in R LE with gait, standing, trasnfers, improved with VCs     []  See Progress Note/Recertification   Patient will continue to benefit from skilled PT services to modify and progress therapeutic interventions, address functional mobility deficits, address ROM deficits, address strength deficits, analyze and address soft tissue restrictions, analyze and cue movement patterns, analyze and modify body mechanics/ergonomics, assess and modify postural abnormalities and instruct in home and community integration to attain remaining goals. Progress toward goals / Updated goals: · Short Term Goals: To be accomplished in 2-3 weeks:  1) Establish HEP to prevent further disability.  MET  2) Patient will improve R knee AROM flexion to >100 degrees for increases ease with ADL's. progressing, AAROM =90 degs  3) Patient will improve R knee AROM extension to <10 degrees to improve heel/toe gait pattern with ambulation.     · Long Term Goals: To be accomplished in 4-6 weeks:  1) Patient to be independent & compliant with HEP in preparation for D/C.  2) Patient will improve R knee AROM flexion to >110 degrees to increase ease with ADL's.   3) Improve strength of R hip abduction to 4/5 with no c/o pain to increase dynamic stability with gai     PLAN  []  Upgrade activities as tolerated yes Continue plan of care   []  Discharge due to :    []  Other: discuss plan for d/c due to moving March 24, 2017.      Therapist: Keya Robbins PTA    Date: 3/10/2017 Time: 9:58 AM     Future Appointments  Date Time Provider Royce Flores   3/13/2017 9:30 AM Keya Robbins [facility]   3/15/2017 10:30 AM Jossie Park [facility]   3/15/2017 1:40 PM Eric Shaikh PA-C [facility]   3/17/2017 9:30 AM Keya Robbins [facility]   3/20/2017 9:30 AM Keya Robbins [facility]   3/22/2017 9:00 AM Jossie Park [facility]   3/24/2017 9:30 AM Keya Robbins [facility]   [facility]27/2017 [facility]   3/29/2017 10:30 AM Jossie Park [facility]   3/31/2017 9:30 AM Keya Robbins [facility]

## 2017-03-13 ENCOUNTER — HOSPITAL ENCOUNTER (OUTPATIENT)
Dept: PHYSICAL THERAPY | Age: 67
Discharge: HOME | End: 2017-03-13
Payer: MEDICARE

## 2017-03-13 PROCEDURE — 97110 THERAPEUTIC EXERCISES: CPT

## 2017-03-13 PROCEDURE — 97140 MANUAL THERAPY 1/> REGIONS: CPT

## 2017-03-13 PROCEDURE — 97530 THERAPEUTIC ACTIVITIES: CPT

## 2017-03-13 NOTE — PROGRESS NOTES
PHYSICAL THERAPY - DAILY TREATMENT NOTE    Patient Name: Liliana Bartholomew        Date: 3/13/2017  : 1950   yes Patient  Verified  Visit #:   4    Insurance: Payor: Vi Campbell / Plan: KELLEN MONROY MEDICARE COMPLETE / Product Type: Managed Care Medicare /      In time:  Out time: 1030   Total Treatment Time: 61     Medicare Time Tracking (below)   Total Timed Codes (min):  50 1:1 Treatment Time: 23     TREATMENT AREA =  Right knee pain [M25.561]    SUBJECTIVE  Pain Level (on 0 to 10 scale):  0 / 10   Medication Changes/New allergies or changes in medical history, any new surgeries or procedures?    no  If yes, update Summary List   Subjective Functional Status/Changes:  []  No changes reported     \"I am feeling good, I do not have any pain\"         OBJECTIVE  Modalities Rationale:     decrease inflammation and decrease pain to improve patient's ability to  perform ADLs/prolong stding and amb/stairs with ease    min [] Estim, type/location:                                      []  att     []  unatt     []  w/US     []  w/ice    []  w/heat    min []  Mechanical Traction: type/lbs                   []  pro   []  sup   []  int   []  cont    []  before manual    []  after manual    min []  Ultrasound, settings/location:      min []  Iontophoresis w/ dexamethasone, location:                                               []  take home patch       []  in clinic   10 min [x]  Ice     []  Heat    location/position: supine with elevation with 1/2 wedge under ankle for TKE    min []  Vasopneumatic Device, press/temp:     min []  Other:    [x] Skin assessment post-treatment (if applicable):    [x]  intact    []  redness- no adverse reaction     []redness  adverse reaction:        32/13 min Therapeutic Exercise:  [x]  See flow sheet   Rationale:      increase ROM, increase strength and improve coordination to improve the patients ability to  perform ADLs/prolong stding and amb/stairs with ease 10 min Manual Therapy: patella mobs, STM to med/lat knee, and PROM flex/ext   Rationale:      decrease pain, increase ROM, increase tissue extensibility, decrease trigger points and increase postural awareness to improve patient's ability to  perform ADLs/prolong stding and amb/stairs with ease         8/0 min Therapeutic Activity: forced R sit<>std transfer, knee ext str for TKE during gait   Rationale:    increase ROM and increase strength to improve the patients ability to  perform ADLs/prolong stding and amb/stairs with ease        min Patient Education:  yes  Reviewed HEP   []  Progressed/Changed HEP based on:  Pt ed on importance and benefits of compliance with HEP, core strength/stability and proper posture; pt verbalized understanding       Other Objective/Functional Measures:    VCs + demo to perform proper technique for TE  initiated TKE with tband without c/o p!  continues to lack full rotation on bike, note demos improved ROM, very close to full rot     Post Treatment Pain Level (on 0 to 10) scale:   0  / 10     ASSESSMENT  Assessment/Changes in Function:     Progressed there-ex without c/o increase p!    improved WBing on R LE      []  See Progress Note/Recertification   Patient will continue to benefit from skilled PT services to modify and progress therapeutic interventions, address functional mobility deficits, address ROM deficits, address strength deficits, analyze and address soft tissue restrictions, analyze and cue movement patterns, analyze and modify body mechanics/ergonomics, assess and modify postural abnormalities and instruct in home and community integration to attain remaining goals. Progress toward goals / Updated goals: · Short Term Goals: To be accomplished in 2-3 weeks:  1) Establish HEP to prevent further disability.  MET  2) Patient will improve R knee AROM flexion to >100 degrees for increases ease with ADL's. progressing, AAROM =90 degs  3) Patient will improve R knee AROM extension to <10 degrees to improve heel/toe gait pattern with ambulation.     · Long Term Goals: To be accomplished in 4-6 weeks:  1) Patient to be independent & compliant with HEP in preparation for D/C.  2) Patient will improve R knee AROM flexion to >110 degrees to increase ease with ADL's.   3) Improve strength of R hip abduction to 4/5 with no c/o pain to increase dynamic stability with gai     PLAN  []  Upgrade activities as tolerated yes Continue plan of care   []  Discharge due to :    []  Other: discuss plan for d/c due to moving March 24, 2017.      Therapist: Myrna Turk PTA    Date: 3/13/2017 Time: 9:58 AM     Future Appointments  Date Time Provider Royce Flores   3/15/2017 10:30 AM Milton marcelino [facility]   3/15/2017 1:40 PM Jas Araya PA-C [facility]   3/17/2017 9:30 AM Myrna Turk PTA [facility]   3/20/2017 9:30 AM Myrna Turk PTA [facility]   3/22/2017 9:00 AM Albina Keller [facility]   3/24/2017 9:30 AM Myrna Turk PTA [facility]   [facility]   3/29/2017 10:30 AM Albina Keller [facility]   3/31/2017 9:30 AM Myrna Turk [facility]

## 2017-03-15 ENCOUNTER — OFFICE VISIT (OUTPATIENT)
Dept: ORTHOPEDIC SURGERY | Facility: CLINIC | Age: 67
End: 2017-03-15

## 2017-03-15 ENCOUNTER — HOSPITAL ENCOUNTER (OUTPATIENT)
Dept: PHYSICAL THERAPY | Age: 67
Discharge: HOME | End: 2017-03-15
Payer: MEDICARE

## 2017-03-15 VITALS
DIASTOLIC BLOOD PRESSURE: 91 MMHG | BODY MASS INDEX: 30.73 KG/M2 | TEMPERATURE: 98.4 F | SYSTOLIC BLOOD PRESSURE: 168 MMHG | WEIGHT: 180 LBS | HEART RATE: 76 BPM | HEIGHT: 64 IN

## 2017-03-15 DIAGNOSIS — M25.561 CHRONIC PAIN OF RIGHT KNEE: Primary | ICD-10-CM

## 2017-03-15 DIAGNOSIS — Z96.651 STATUS POST RIGHT KNEE REPLACEMENT: ICD-10-CM

## 2017-03-15 DIAGNOSIS — G89.29 CHRONIC PAIN OF RIGHT KNEE: Primary | ICD-10-CM

## 2017-03-15 PROCEDURE — G8978 MOBILITY CURRENT STATUS: HCPCS

## 2017-03-15 PROCEDURE — 97140 MANUAL THERAPY 1/> REGIONS: CPT

## 2017-03-15 PROCEDURE — 97110 THERAPEUTIC EXERCISES: CPT

## 2017-03-15 PROCEDURE — G8979 MOBILITY GOAL STATUS: HCPCS

## 2017-03-15 RX ORDER — OXYCODONE AND ACETAMINOPHEN 7.5; 325 MG/1; MG/1
1-2 TABLET ORAL
Qty: 60 TAB | Refills: 0 | Status: SHIPPED | OUTPATIENT
Start: 2017-03-15

## 2017-03-15 NOTE — PROGRESS NOTES
[address] [facility], [address] [facility]  [phone]           Patient: Yee Gambino                MRN: [id number]       SSN: [id number]  YOB: 1950        AGE: 79 y.o. SEX: female  Body mass index is 30.9 kg/(m^2). PCP: Ann Aragon MD  03/15/17      This office note has been dictated. REVIEW OF SYSTEMS:  Constitutional: Negative for fever, chills, weight loss and malaise/fatigue. HENT: Negative. Eyes: Negative. Respiratory: Negative. Cardiovascular: Negative. Gastrointestinal: No bowel incontinence or constipation. Genitourinary: No bladder incontinence or saddle anesthesia. Skin: Negative. Neurological: Negative. Endo/Heme/Allergies: Negative. Psychiatric/Behavioral: Negative. Musculoskeletal: As per HPI above.      Past Medical History:   Diagnosis Date    Advance directive discussed with patient 8/17/2016    Aspirin intolerance 1/26/2017    Asthma     bronchitis    Cancer ([address] 1998    Left breast cancer    Cervical radiculopathy     Cigarette nicotine dependence in remission 8/17/2016    DDD (degenerative disc disease), lumbar     Degeneration of cervical intervertebral disc     Degeneration of thoracic intervertebral disc     Diabetes mellitus ([address]     type 2    Diabetes mellitus type 2, controlled ([address] 8/2/2016    Essential hypertension with goal blood pressure less than 140/90 8/10/2016    GERD (gastroesophageal reflux disease)     GI bleed 6/29/16    Gout     H. pylori infection 7/11/2016    H/O: GI bleed 1/26/2017    Hammer toe of left foot     Hypercholesterolemia     Hypertension     Lumbago with sciatica     Lumbar radiculopathy     Malignant neoplasm of left female breast ([address] 8/2/2016    Mixed incontinence 8/10/2016    Obesity, Class I, BMI 30-34.9 8/2/2016    osteoarthritis     Osteoarthrosis, generalized, involving multiple sites     Osteoporosis     Paronychia of finger     Primary insomnia 8/10/2016    Pure hypercholesterolemia 2/9/2017    Sciatica of left side     Scoliosis     Segmental and somatic dysfunction of lumbar region     Segmental and somatic dysfunction of pelvic region     Urinary tract infection     Vertigo     Vitamin B12 deficiency     Vitamin D deficiency     Wound infection          Current Outpatient Prescriptions:     oxyCODONE-acetaminophen (PERCOCET 7.5) 7.5-325 mg per tablet, Take 1-2 Tabs by mouth every eight (8) hours as needed. Max Daily Amount: 6 Tabs., Disp: 60 Tab, Rfl: 0    aspirin (ASPIRIN) 325 mg tablet, Take 325 mg by mouth daily. , Disp: , Rfl:     indomethacin (INDOCIN) 50 mg capsule, TK ONE C PO TID, Disp: , Rfl: 3    ferrous sulfate 325 mg (65 mg iron) tablet, Take 1 Tab by mouth two (2) times daily (with meals). , Disp: 60 Tab, Rfl: 2    warfarin (COUMADIN) 3 mg tablet, Take 1 Tab by mouth daily for 30 days. , Disp: 30 Tab, Rfl: 0    LORazepam (ATIVAN) 1 mg tablet, Take 1 Tab by mouth every eight (8) hours as needed. Max Daily Amount: 3 mg. Indications: ANXIETY, Disp: 30 Tab, Rfl: 0    valsartan-hydroCHLOROthiazide (DIOVAN-HCT) 160-12.5 mg per tablet, Take 1 Tab by mouth daily. , Disp: 90 Tab, Rfl: 3    metFORMIN (GLUCOPHAGE) 500 mg tablet, Take 1.5 Tabs by mouth two (2) times daily (with meals). (Patient taking differently: Take 500 mg by mouth two (2) times daily (with meals). ), Disp: 180 Tab, Rfl: 3    CRANBERRY FRUIT EXTRACT (CRANBERRY CONCENTRATE PO), Take 2 Tabs by mouth daily. , Disp: , Rfl:     amLODIPine (NORVASC) 10 mg tablet, Take 1 Tab by mouth daily. , Disp: 90 Tab, Rfl: 3    metoprolol succinate (TOPROL-XL) 50 mg XL tablet, Take 1 Tab by mouth daily. , Disp: 90 Tab, Rfl: 3    febuxostat (ULORIC) 40 mg tab tablet, Take 1 Tab by mouth daily. , Disp: 90 Tab, Rfl: 3    fexofenadine (ALLEGRA) 180 mg tablet, Take 1 Tab by mouth daily.  (Patient taking differently: Take 180 mg by mouth daily as needed.), Disp: 90 Tab, Rfl: 3    colchicine 0.6 mg tablet, Take 1 Tab by mouth daily. (Patient taking differently: Take 1 Tab by mouth daily. prn  Indications: GOUT PREVENTION), Disp: 90 Tab, Rfl: 0    oxybutynin chloride XL (DITROPAN XL) 10 mg CR tablet, Take 1 Tab by mouth daily. , Disp: 90 Tab, Rfl: 3    melatonin 3 mg tablet, Take 1 Tab by mouth nightly., Disp: 90 Tab, Rfl: 3    pravastatin (PRAVACHOL) 20 mg tablet, Take 1 Tab by mouth nightly., Disp: 90 Tab, Rfl: 3    pantoprazole (PROTONIX) 40 mg tablet, Take 40 mg by mouth daily as needed. , Disp: , Rfl:     senna (SENOKOT) 8.6 mg tablet, Take 2 Tabs by mouth nightly., Disp: , Rfl:     Allergies   Allergen Reactions    Nsaids (Non-Steroidal Anti-Inflammatory Drug) Other (comments)     GI Bleed    Aspirin Other (comments)     Gi bleed         Social History     Social History    Marital status:      Spouse name: N/A    Number of children: N/A    Years of education: N/A     Occupational History    Not on file. Social History Main Topics    Smoking status: Former Smoker     Packs/day: 0.25     Years: 5.00     Quit date: 1/1/1976    Smokeless tobacco: Never Used      Comment: 4 cigarettes per day    Alcohol use No    Drug use: No    Sexual activity: Not Currently     Partners: Male     Birth control/ protection: None     Other Topics Concern    Not on file     Social History Narrative       Past Surgical History:   Procedure Laterality Date    HX MASTECTOMY Left 1998    HX TUBAL LIGATION Bilateral              We did see Ms. Kramer for follow-up in regards to her right knee replacement. The patient is now four weeks status post surgery, and she is progressing well. She has been a little noncompliant with working on her range of motion activities on her own. She has been working hard in physical therapy three times per week. Her pain is well controlled with Percocet.  She has had no recent fevers, chills, systemic changes, or injuries to report. The patient does report she is moving back to [place] next Friday. PHYSICAL EXAMINATION: In general the patient is alert and oriented x 3 and is in no acute distress. The patient is well-developed and well-nourished with a normal affect. The patient is afebrile. Examination of the right knee reveals the skin to be intact. There is no erythema or ecchymosis. There is no warmth or signs for infection or cellulitis. Range of motion is approximately -5° to 95°. Patella tracks nicely. Stability is excellent. There is no calf tenderness and swelling. There is negative Homans. There is no evidence of DVT noted. RADIOGRAPHS:  Review of radiographs including AP, lateral, and skyline of each of the knees reveals the total knee components to be well fixed and in excellent position without evidence for loosening or fracture noted. ASSESSMENT:  Status post right total knee replacement. PLAN:  At this point the patient is doing extremely well. I have asked her to work on her range of motion activities on an hourly basis with both flexion and extension, which were demonstrated for the patient today in the office. She is given a refill of her pain medicine. She is given a prescription for outpatient physical therapy, which she will take to [place] with her. We will plan on seeing her back in the office on an as needed basis. We certainly wish her the best. I instructed her to continue range of motion exercises to ensure good outcome. I have asked her to obtain an orthopaedic surgeon in [place] to follow along with her.                 JR Hayder ALVARADO, PETER, ATC

## 2017-03-15 NOTE — PROGRESS NOTES
[address] [facility] PHYSICAL THERAPY  [address] [facility] [place], [address] - Phone: [phone]  Fax: [phone]  Samantha          Patient Name: Lizzie Rick : 1950   Treatment/Medical Diagnosis: Right knee pain [M25.561]   Onset Date: 2017 (DOS)    Referral Source: Carline Najera MD [facility]): 3/6/2017   Prior Hospitalization: See Medical History Provider #: [id number]   Prior Level of Function: Ambulating without AD prior to R TKA   Comorbidities: DM, HTN, h/o breast CA   Medications: Verified on Patient Summary List   Vis[facility] from [facility]: 4 Missed Visits: 0     Goal/Measure of Progress Goal Met? 1.  Establish HEP to prevent further disability. Status at last Eval: NA Current Status: Compliant with HEP yes   2. Patient will improve R knee AROM flexion to >100 degrees for increases ease with ADL's. Status at last Eval: 90 degrees Current Status: 95 degrees progressing   3. Patient will improve R knee AROM extension to <10 degrees to improve heel/toe gait pattern with ambulation. Status at last Eval: 20 Current Status: 5 yes     Key Functional Changes/Progress: Patient has progressed well with improved R knee extension. Slow progress R knee flexion and strength (see measurements below). She continues to ambulate with decreased R knee flexion during the swing phase of gait. R knee AROM: 5 - 95 degrees; PROM 0 - 100 degrees     Strength (1-5)  Hip Left Right   Flexion 4 4+   Extension 3 2   Abduction 3 2   Knee Left Right   Extension 5 5   Flexion 4 4     Patient reports she will be returning to her home in [place] in ~ 1 1/2 weeks. Therefore, anticipate DC from this clinic on 3/24/2017. Feel she could benefit from continued PT to further improve R knee AROM, strength, and gait.     Problem List: pain affecting function, decrease ROM, decrease strength, edema affecting function, impaired gait/ balance, decrease ADL/ functional abilitiies, decrease activity tolerance, decrease flexibility/ joint mobility and decrease transfer abilities   Treatment Plan may include any combination of the following: Therapeutic exercise, Therapeutic activities, Neuromuscular re-education, Physical agent/modality, Gait/balance training, Manual therapy, Patient education and Self Care training  Patient Goal(s) has been updated and includes:      Goals for this certification period include and are to be achieved in   2  weeks:  1) Patient will be independent with HEP. 2) Patient will improve R knee AROM flexion to >100 degrees for increases ease with ADL's.   3) Patient will improve R knee AROM extension to 0 degrees to improve heel/toe gait pattern with ambulation. Frequency / Duration:   Patient to be seen   2-3   times per week for   2    weeks:  G-Codes (GP): Mobility [id number] Current CK= 40-59%  Goal CJ= 20-39%. The severity rating is based on the FOTO Score  Assessments/Recommendations: continue PT 2-3x/week for 2 weeks to further progress R knee AROM, strength, and restore normal mechanics with gait. If you have any questions/comments please contact us directly at [phone]. Thank you for allowing us to assist in the care of your patient. Therapist Signature: Brad Duque PT Date: 7/07/4524   Certification Period:  Reporting Period: 3/6/2017 - 6/5/2017  3/6/2017 - 3/15/2017 Time: 11:31 AM   NOTE TO PHYSICIAN:  PLEASE COMPLETE THE ORDERS BELOW AND FAX TO   [facility]: [phone].   If you are unable to process this request in 24 hours please contact our office: [phone].    ___ I have read the above report and request that my patient continue as recommended.   ___ I have read the above report and request that my patient continue therapy with the following changes/special instructions:_________________________________________________________   ___ I have read the above report and request that my patient be discharged from therapy.      Physician Signature:        Date:       Time:

## 2017-03-15 NOTE — PROGRESS NOTES
PHYSICAL THERAPY - DAILY TREATMENT NOTE    Patient Name: Wilma Garza        Date: 3/15/2017  : 1950   YES Patient  Verified  Visit #:   4     Insurance: Payor: Alon Damon / Plan: [address] / Product Type: Managed Care Medicare /      In time: 10:30 Out time: 11:25   Total Treatment Time: 55     Medicare Time Tracking (below)   Total Timed Codes (min):  55 1:1 Treatment Time:  45     TREATMENT AREA =  R TKA    SUBJECTIVE    Pain Level (on 0 to 10 scale):  0  / 10   Medication Changes/New allergies or changes in medical history, any new surgeries or procedures? NO    If yes, update Summary List   Subjective Functional Status/Changes:  []  No changes reported     Patient reports she sees MD later today. OBJECTIVE    Modalities Rationale:     Decrease edema   min [] Estim, type/location:                                      []  att     []  unatt     []  w/US     []  w/ice    []  w/heat    min []  Mechanical Traction: type/lbs                   []  pro   []  sup   []  int   []  cont    []  before manual    []  after manual    min []  Ultrasound, settings/location:      min []  Iontophoresis w/ dexamethasone, location:                                               []  take home patch       []  in clinic   10 min [x]  Ice     []  Heat    location/position: supine    min []  Vasopneumatic Device, press/temp:     min []  Other:    [x] Skin assessment post-treatment (if applicable):    [x]  intact    []  redness- no adverse reaction     []redness  adverse reaction:      35 min Therapeutic Exercise:  [x]  See flow sheet   Rationale:      increase ROM, increase strength, improve coordination, improve balance and increase proprioception to improve the patients ability to perform ADL's, gait, and functional mobility with increased ease and decreased pain.      10 min Manual Therapy: Patellar mobs, STM to ant/post R knee; PROM to flex/ext   Rationale:      decrease pain, increase ROM and increase tissue extensibility to improve patient's ability to perform ADL's, gait, and functional mobility with increased ease and decreased pain. min Patient Education:  YES  Reviewed HEP   []  Progressed/Changed HEP based on:   Cont HEP     Other Objective/Functional Measures:    R knee AROM: 5 - 95 degrees; PROM 0 - 100 degrees                                          Strength (1-5)  Hip Left Right   Flexion 4 4+   Extension 3 2   Abduction 3 2   Knee Left Right   Extension 5 5   Flexion 4 4            Post Treatment Pain Level (on 0 to 10) scale:   0  / 10     ASSESSMENT    Assessment/Changes in Function:     See PN to MD     []  See Progress Note/Recertification   Patient will continue to benefit from skilled PT services to modify and progress therapeutic interventions, address functional mobility deficits, address ROM deficits, address strength deficits, analyze and address soft tissue restrictions, analyze and cue movement patterns, analyze and modify body mechanics/ergonomics, assess and modify postural abnormalities, address imbalance/dizziness and instruct in home and community integration to attain remaining goals.      Progress toward goals / Updated goals:    See PN to MD     PLAN    [x]  Upgrade activities as tolerated YES Continue plan of care   []  Discharge due to :    []  Other:      Therapist: Sarah Lopez PT    Date: 3/15/2017 Time: 10:52 AM     Future Appointments  Date Time Provider Royce Flores   3/15/2017 1:40 PM Savi Luna PA-C [facility]   3/17/2017 9:30 AM Papa Jara [facility]   3/20/2017 9:30 AM Papa Jara [facility]   3/22/2017 9:00 AM Sarah Lopez PT [facility]   3/24/2017 9:30 AM Papa Jara [facility]   3/27/2017 9:30 AM Papa Jara [facility]   3/29/2017 10:30 AM Sarah Lopez PT [facility]   3/31/2017 9:30 AM Papa Jara [facility]

## 2017-03-16 ENCOUNTER — PATIENT OUTREACH (OUTPATIENT)
Dept: FAMILY MEDICINE CLINIC | Facility: CLINIC | Age: 67
End: 2017-03-16

## 2017-03-16 NOTE — PROGRESS NOTES
This note will not be viewable in [address] Patient admitted to [facility]  on 2/14/17-2/17/17 for  Right Total Knee Replacement. Discharge Home with [facility] on 2/17/17    Attempt to contact patient via telephone on 3/16/17 for post hospital  follow up. Unable to reach. Left message on voicemail with office contact information.

## 2017-03-17 ENCOUNTER — HOSPITAL ENCOUNTER (OUTPATIENT)
Dept: PHYSICAL THERAPY | Age: 67
Discharge: HOME | End: 2017-03-17
Payer: MEDICARE

## 2017-03-17 PROCEDURE — 97530 THERAPEUTIC ACTIVITIES: CPT

## 2017-03-17 PROCEDURE — 97140 MANUAL THERAPY 1/> REGIONS: CPT

## 2017-03-17 PROCEDURE — 97110 THERAPEUTIC EXERCISES: CPT

## 2017-03-17 NOTE — PROGRESS NOTES
PHYSICAL THERAPY - DAILY TREATMENT NOTE    Patient Name: Mansoor Palacios        Date: 3/17/2017  : 1950   yes Patient  Verified  Visit #:     Insurance: Payor: Rodolfo Benz / Plan: SANDRAANAI MONROY MEDICARE COMPLETE / Product Type: Managed Care Medicare /      In time: 339 Out time: 1030   Total Treatment Time: 54     Medicare Time Tracking (below)   Total Timed Codes (min):  45 1:1 Treatment Time: 40     TREATMENT AREA =  Right knee pain [M25.561]    SUBJECTIVE  Pain Level (on 0 to 10 scale):  0 / 10   Medication Changes/New allergies or changes in medical history, any new surgeries or procedures?    no  If yes, update Summary List   Subjective Functional Status/Changes:  []  No changes reported     \"Its just stiff, I know its the cold weather. I told the  how good you all are! \"         OBJECTIVE  Modalities Rationale:     decrease inflammation and decrease pain to improve patient's ability to  perform ADLs/prolong stding and amb/stairs with ease    min [] Estim, type/location:                                      []  att     []  unatt     []  w/US     []  w/ice    []  w/heat    min []  Mechanical Traction: type/lbs                   []  pro   []  sup   []  int   []  cont    []  before manual    []  after manual    min []  Ultrasound, settings/location:      min []  Iontophoresis w/ dexamethasone, location:                                               []  take home patch       []  in clinic   10 min [x]  Ice     []  Heat    location/position: supine with elevation with 1/2 wedge under ankle for TKE    min []  Vasopneumatic Device, press/temp:     min []  Other:    [x] Skin assessment post-treatment (if applicable):    [x]  intact    []  redness- no adverse reaction     []redness  adverse reaction:        / min Therapeutic Exercise:  [x]  See flow sheet   Rationale:      increase ROM, increase strength and improve coordination to improve the patients ability to  perform ADLs/prolong stding and amb/stairs with ease       10 min Manual Therapy: patella mobs, STM to med/lat knee/sup/inf knee, HS str, and PROM flex/ext   Rationale:      decrease pain, increase ROM, increase tissue extensibility, decrease trigger points and increase postural awareness to improve patient's ability to  perform ADLs/prolong stding and amb/stairs with ease         8 min Therapeutic Activity: forced R sit<>std transfer  gait training with increased stance time on R LE throughout rx   Rationale:    increase ROM and increase strength to improve the patients ability to  perform ADLs/prolong stding and amb/stairs with ease        min Patient Education:  yes  Reviewed HEP   []  Progressed/Changed HEP based on:  Pt ed on importance and benefits of compliance with HEP, core strength/stability and proper posture; pt verbalized understanding       Other Objective/Functional Measures:    VCs + demo to perform proper technique for TE  R Knee AROM/PROM=2-100 degs/0-105 degs  demos decrease WBing on R LE during gait improved with VCs     Post Treatment Pain Level (on 0 to 10) scale:   0  / 10     ASSESSMENT  Assessment/Changes in Function:     Progressed there-ex without c/o increase p!  increased knee flex 5 degs. and ext 3 degs     []  See Progress Note/Recertification   Patient will continue to benefit from skilled PT services to modify and progress therapeutic interventions, address functional mobility deficits, address ROM deficits, address strength deficits, analyze and address soft tissue restrictions, analyze and cue movement patterns, analyze and modify body mechanics/ergonomics, assess and modify postural abnormalities and instruct in home and community integration to attain remaining goals. Progress toward goals / Updated goals: · Short Term Goals: To be accomplished in 2-3 weeks:  1) Establish HEP to prevent further disability.  MET  2) Patient will improve R knee AROM flexion to >100 degrees for increases ease with ADL's. progressing, AROM=100 degs  3) Patient will improve R knee AROM extension to <10 degrees to improve heel/toe gait pattern with ambulation. MET, 2 degs     · Long Term Goals: To be accomplished in 4-6 weeks:  1) Patient to be independent & compliant with HEP in preparation for D/C.  2) Patient will improve R knee AROM flexion to >110 degrees to increase ease with ADL's. progressing, AROM=100 degs  3) Improve strength of R hip abduction to 4/5 with no c/o pain to increase dynamic stability with gait     PLAN  []  Upgrade activities as tolerated yes Continue plan of care   []  Discharge due to :    []  Other: discuss plan for d/c due to moving March 24, 2017.      Therapist: Allegar Gomez PTA    Date: 3/17/2017 Time: 11:56 AM     Future Appointments  Date Time Provider Royce Flores   3/20/2017 9:30 AM Allegra Gomez PTA [facility]   3/22/2017 9:00 AM Umair Gross PT [facility]   3/24/2017 9:30 AM [facility]   3/27/2017 9:30 AM [facility]   3/29/2017 10:30 AM Umair Gross PT [facility]   3/31/2017 9:30 AM Allegra Gomez PTA [facility]

## 2017-03-20 ENCOUNTER — HOSPITAL ENCOUNTER (OUTPATIENT)
Dept: PHYSICAL THERAPY | Age: 67
Discharge: HOME | End: 2017-03-20
Payer: MEDICARE

## 2017-03-20 PROCEDURE — 97110 THERAPEUTIC EXERCISES: CPT

## 2017-03-20 PROCEDURE — 97140 MANUAL THERAPY 1/> REGIONS: CPT

## 2017-03-20 PROCEDURE — 97530 THERAPEUTIC ACTIVITIES: CPT

## 2017-03-20 NOTE — PROGRESS NOTES
PHYSICAL THERAPY - DAILY TREATMENT NOTE    Patient Name: Marshall Tony        Date: 3/20/2017  : 1950   yes Patient  Verified  Visit #:     Insurance: Payor: Ryder Aguillon / Plan: KELLEN MONROY MEDICARE COMPLETE / Product Type: Managed Care Medicare /      In time: 186 Out time: 1030   Total Treatment Time: 60     Medicare Time Tracking (below)   Total Timed Codes (min):  45 1:1 Treatment Time: 40     TREATMENT AREA =  Right knee pain [M25.561]    SUBJECTIVE  Pain Level (on 0 to 10 scale):  0 / 10   Medication Changes/New allergies or changes in medical history, any new surgeries or procedures?    no  If yes, update Summary List   Subjective Functional Status/Changes:  []  No changes reported     \"I am going to keep working hard when I move.  Alcides Masterson is my last day\"         OBJECTIVE  Modalities Rationale:     decrease inflammation and decrease pain to improve patient's ability to  perform ADLs/prolong stding and amb/stairs with ease    min [] Estim, type/location:                                      []  att     []  unatt     []  w/US     []  w/ice    []  w/heat    min []  Mechanical Traction: type/lbs                   []  pro   []  sup   []  int   []  cont    []  before manual    []  after manual    min []  Ultrasound, settings/location:      min []  Iontophoresis w/ dexamethasone, location:                                               []  take home patch       []  in clinic   10 min [x]  Ice     []  Heat    location/position: supine with elevation with 1/2 wedge under ankle for TKE    min []  Vasopneumatic Device, press/temp:     min []  Other:    [x] Skin assessment post-treatment (if applicable):    [x]  intact    []  redness- no adverse reaction     []redness  adverse reaction:        20/20 min Therapeutic Exercise:  [x]  See flow sheet   Rationale:      increase ROM, increase strength and improve coordination to improve the patients ability to  perform ADLs/prolong stding and amb/stairs with ease       10 min Manual Therapy: patella mobs, STM to med/lat knee/sup/inf knee, HS str, and PROM flex/ext   Rationale:      decrease pain, increase ROM, increase tissue extensibility, decrease trigger points and increase postural awareness to improve patient's ability to  perform ADLs/prolong stding and amb/stairs with ease         15 min Therapeutic Activity: forced R sit<>std transfer  gait training with increased stance time on R LE throughout rx  stair training  HRs for push off and TRs for ankle HS during gait     Rationale:    increase ROM and increase strength to improve the patients ability to  perform ADLs/prolong stding and amb/stairs with ease        min Patient Education:  yes  Reviewed HEP   []  Progressed/Changed HEP based on:  Pt ed on importance and benefits of compliance with HEP, core strength/stability and proper posture; pt verbalized understanding       Other Objective/Functional Measures:    VCs + demo to perform proper technique for TE  initiated HRs/TRs, step ups and increased reps without c/o p!  demos 25% AROM HRs/TRs  VCs to perform slow ecc lowering with step ups   Post Treatment Pain Level (on 0 to 10) scale:   0  / 10     ASSESSMENT  Assessment/Changes in Function:     Progressed there-ex without c/o increase p!  demos good quad activation with step ups     []  See Progress Note/Recertification   Patient will continue to benefit from skilled PT services to modify and progress therapeutic interventions, address functional mobility deficits, address ROM deficits, address strength deficits, analyze and address soft tissue restrictions, analyze and cue movement patterns, analyze and modify body mechanics/ergonomics, assess and modify postural abnormalities and instruct in home and community integration to attain remaining goals. Progress toward goals / Updated goals: · Short Term Goals: To be accomplished in 2-3 weeks:  1) Establish HEP to prevent further disability. MET  2) Patient will improve R knee AROM flexion to >100 degrees for increases ease with ADL's. progressing, AROM=100 degs  3) Patient will improve R knee AROM extension to <10 degrees to improve heel/toe gait pattern with ambulation. MET, 2 degs     · Long Term Goals: To be accomplished in 4-6 weeks:  1) Patient to be independent & compliant with HEP in preparation for D/C.  2) Patient will improve R knee AROM flexion to >110 degrees to increase ease with ADL's. progressing, AROM=100 degs  3) Improve strength of R hip abduction to 4/5 with no c/o pain to increase dynamic stability with gait     PLAN  []  Upgrade activities as tolerated yes Continue plan of care   []  Discharge due to :    []  Other: discuss plan for d/c in 2 visits due to moving March 24, 2017.      Therapist: Adam Downing PTA    Date: 3/20/2017 Time: 5:29 PM     Future Appointments  Date Time Provider Royce Flores   3/22/2017 9:00 AM Milton marcelino, PT [facility]   3/24/2017 9:30 AM [facility]   3/27/2017 9:30 AM [facility]   3/29/2017 10:30 AM Smitha Park, PT [facility]   3/31/2017 9:30 AM Adam Downing PTA [facility]

## 2017-03-22 ENCOUNTER — HOSPITAL ENCOUNTER (OUTPATIENT)
Dept: PHYSICAL THERAPY | Age: 67
Discharge: HOME | End: 2017-03-22
Payer: MEDICARE

## 2017-03-22 PROCEDURE — 97140 MANUAL THERAPY 1/> REGIONS: CPT

## 2017-03-22 PROCEDURE — 97110 THERAPEUTIC EXERCISES: CPT

## 2017-03-22 NOTE — PROGRESS NOTES
PHYSICAL THERAPY - DAILY TREATMENT NOTE    Patient Name: Narda Astorga        Date: 3/22/2017  : 1950   YES Patient  Verified  Visit #:     Insurance: Payor: Niurka Star / Plan: [address] / Product Type: Managed Care Medicare /      In time: 9:05 Out time: 10:10   Total Treatment Time: 65     Medicare Time Tracking (below)   Total Timed Codes (min):  65 1:1 Treatment Time:  55     TREATMENT AREA =  Right knee pain [M25.561]    SUBJECTIVE    Pain Level (on 0 to 10 scale):  0  / 10   Medication Changes/New allergies or changes in medical history, any new surgeries or procedures? NO    If yes, update Summary List   Subjective Functional Status/Changes:  []  No changes reported     Patient with no new c/o's. Reports \"I will miss you all. \"          OBJECTIVE    Modalities Rationale:    Decrease edema/palliative    min [] Estim, type/location:                                      []  att     []  unatt     []  w/US     []  w/ice    []  w/heat    min []  Mechanical Traction: type/lbs                   []  pro   []  sup   []  int   []  cont    []  before manual    []  after manual    min []  Ultrasound, settings/location:      min []  Iontophoresis w/ dexamethasone, location:                                               []  take home patch       []  in clinic   10 min [x]  Ice     []  Heat    location/position: supine    min []  Vasopneumatic Device, press/temp:     min []  Other:    [x] Skin assessment post-treatment (if applicable):    [x]  intact    []  redness- no adverse reaction     []redness  adverse reaction:      45 min Therapeutic Exercise:  [x]  See flow sheet   Rationale:      increase ROM, increase strength, improve coordination, improve balance and increase proprioception to improve the patients ability to perform ADL's, gait, and functional mobility with increased ease and decreased independence.      10 min Manual Therapy: Patellar mobs, ant/post tibial glides; PROM to R knee for flex and ext   Rationale:      decrease pain, increase ROM and increase tissue extensibility to improve patient's ability to perform ADL's, gait, and functional mobility with increased ease and decreased independence. min Patient Education:  YES  Reviewed HEP   []  Progressed/Changed HEP based on:   Updated HEP per handout in paper chart     Other Objective/Functional Measures:                                          Strength (1-5)  Hip Left Right   Flexion 4 5   Extension 3 3   Abduction 3 2   Knee Left Right   Extension 5 5   Flexion 4+ 4+     R  knee AROM: 5 - 100; PROM 0 - 105 degrees       Post Treatment Pain Level (on 0 to 10) scale:   0  / 10     ASSESSMENT    Assessment/Changes in Function:     R knee ROM and strength progressing. []  See Progress Note/Recertification   Patient will continue to benefit from skilled PT services to modify and progress therapeutic interventions, address functional mobility deficits, address ROM deficits, address strength deficits, analyze and address soft tissue restrictions, analyze and cue movement patterns, analyze and modify body mechanics/ergonomics, assess and modify postural abnormalities, address imbalance/dizziness and instruct in home and community integration to attain remaining goals. Progress toward goals / Updated goals:    · Long Term Goals: To be accomplished in 4-6 weeks:  1) Patient to be independent & compliant with HEP in preparation for D/C.  2) Patient will improve R knee AROM flexion to >110 degrees to increase ease with ADL's. progressing, AROM=100 degs  3) Improve strength of R hip abduction to 4/5 with no c/o pain to increase dynamic stability with gait.   R hip abd strength 2/5     PLAN    [x]  Upgrade activities as tolerated YES Continue plan of care   []  Discharge due to :    []  Other:      Therapist: Kannan Ugalde PT    Date: 3/22/2017 Time: 9:42 AM     Future Appointments  Date Time Provider Royce Flores   3/24/2017 9:30 AM [facility]   3/27/2017 9:30 AM [facility]   3/29/2017 10:30 AM Kodi Barraza PT [facility]   3/31/2017 9:30 AM Maricel Garcia [facility]

## 2017-03-24 ENCOUNTER — HOSPITAL ENCOUNTER (OUTPATIENT)
Dept: PHYSICAL THERAPY | Age: 67
Discharge: HOME | End: 2017-03-24
Payer: MEDICARE

## 2017-03-24 PROCEDURE — G8979 MOBILITY GOAL STATUS: HCPCS

## 2017-03-24 PROCEDURE — G8980 MOBILITY D/C STATUS: HCPCS

## 2017-03-24 PROCEDURE — 97140 MANUAL THERAPY 1/> REGIONS: CPT

## 2017-03-24 PROCEDURE — 97530 THERAPEUTIC ACTIVITIES: CPT

## 2017-03-24 PROCEDURE — 97110 THERAPEUTIC EXERCISES: CPT

## 2017-03-24 NOTE — PROGRESS NOTES
[address]  [facility] PHYSICAL THERAPY  [address] [place], [address] - Phone: [phone]  Fax: [phone]  DISCHARGE SUMMARY FOR PHYSICAL THERAPY          Patient Name: Rachel Grady : 1950   Treatment/Medical Diagnosis: Right knee pain [M25.561]   Onset Date: 2017 (DOS)    Referral Source: Octavio Winn MD [facility] [place]): 3/6/2017   Prior Hospitalization: See Medical History Provider #: [id number]   Prior Level of Function: Ambulating without AD prior to R TKA   Comorbidities: DM, HTN, h/o breast CA   Medications: Verified on Patient Summary List   [facility] from [facility]: 9 Missed Visits: 0     Goal/Measure of Progress Goal Met? 1. Patient will be independent with HEP. Status at last Eval: Compliant with HEP Current Status: I with HEP yes   2. Patient will improve R knee AROM flexion to >100 degrees for increases ease with ADL's. Status at last Eval: 95 degrees Current Status: 102 degrees yes   3. Patient will improve R knee AROM extension to 0 degrees to improve heel/toe gait pattern with ambulation. Status at last Eval: 5 degrees Current Status: 0 degrees yes     Key Functional Changes/Progress: Patient has progressed well with physical therapy. R knee AROM is 0 - 102 degrees and PROM is 0 - 112 degrees. Her FOTO (Focused on Therapeutic Outcomes) Functional Status score improved from 53 at eval to 58 today, indicating improved tolerance with functional activities. Strength measurements below:    Strength (1-5)  Hip Left Right   Flexion 4 5   Extension 3 3   Abduction 3 2   Knee Left Right   Extension 5 5   Flexion 4+ 4+         G-Codes (GP): Mobility   Goal CJ= 20-39%  D/C CK= 40-59%. The severity rating is based on the FOTO Score  Assessments/Recommendations: Discontinue therapy. Progressing towards or have reached established goals. Patient returning to her home in [address] [place] [address].   Feel she could benefit from continued PT in [place] to further improve R LE strength and R knee ROM. If you have any questions/comments please contact us directly at [phone]. Thank you for allowing us to assist in the care of your patient. Therapist Signature: Smitha Park, PT Date: 1/99/2772   Certification Period  Reporting Period: 3/6/2017 - 6/5/2017  3/6/2017 - 3/24/2017 Time: 2:32 PM     NOTE TO PHYSICIAN:  PLEASE COMPLETE THE ORDERS BELOW AND FAX TO   [facility] Physical Therapy: [phone]. If you are unable to process this request in 24 hours please contact our office: [phone].    ___ I have read the above report and request that my patient be discharged from therapy.      Physician Signature:        Date:       Time:

## 2017-03-24 NOTE — PROGRESS NOTES
PHYSICAL THERAPY - DAILY TREATMENT NOTE    Patient Name: Edgar Hodges        Date: 3/24/2017  : 1950   yes Patient  Verified  Visit #:     Insurance: Payor: Aundrea Heredia / Plan: Anna Dupont MEDICARE COMPLETE / Product Type: Managed Care Medicare /      In time: 723 Out time: 6782   Total Treatment Time: 72     Medicare Time Tracking (below)   Total Timed Codes (min): 55 1:1 Treatment Time: 45     TREATMENT AREA =  Right knee pain [M25.561]    SUBJECTIVE  Pain Level (on 0 to 10 scale):  0 / 10   Medication Changes/New allergies or changes in medical history, any new surgeries or procedures?    no  If yes, update Summary List   Subjective Functional Status/Changes:  []  No changes reported     \"I love you all, Ill make sure to keep this up\"         OBJECTIVE  Modalities Rationale:     decrease inflammation and decrease pain to improve patient's ability to  perform ADLs/prolong stding and amb/stairs with ease    min [] Estim, type/location:                                      []  att     []  unatt     []  w/US     []  w/ice    []  w/heat    min []  Mechanical Traction: type/lbs                   []  pro   []  sup   []  int   []  cont    []  before manual    []  after manual    min []  Ultrasound, settings/location:      min []  Iontophoresis w/ dexamethasone, location:                                               []  take home patch       []  in clinic   10 min [x]  Ice     []  Heat    location/position: supine with elevation with 1/2 wedge under ankle for TKE    min []  Vasopneumatic Device, press/temp:     min []  Other:    [x] Skin assessment post-treatment (if applicable):    [x]  intact    []  redness- no adverse reaction     []redness  adverse reaction:        22/ min Therapeutic Exercise:  [x]  See flow sheet   Rationale:      increase ROM, increase strength and improve coordination to improve the patients ability to  perform ADLs/prolong stding and amb/stairs with ease 10 min Manual Therapy: patella mobs, STM to med/lat knee/sup/inf knee, HS str, and PROM flex/ext   Rationale:      decrease pain, increase ROM, increase tissue extensibility, decrease trigger points and increase postural awareness to improve patient's ability to  perform ADLs/prolong stding and amb/stairs with ease         23 min Therapeutic Activity: forced R sit<>std transfer  gait training with increased stance time on R LE throughout rx  stair training  HRs for push off and TRs for ankle HS during gait  mini squats  HK amb with CGA>SBA x 60'    Rationale:    increase ROM and increase strength to improve the patients ability to  perform ADLs/prolong stding and amb/stairs with ease        min Patient Education:  yes  Reviewed HEP   []  Progressed/Changed HEP based on:  Pt ed on importance and benefits of compliance with HEP, core strength/stability and proper posture; pt verbalized understanding       Other Objective/Functional Measures:    VCs + demo to perform proper technique for TE  initiated HK amb to promote to increase hip/knee flex during swing phase and to increase HS for proper gait    functional status summery=58 (IE=53/goal=62)  R knee AROM/PROM=0-102/0-112   Post Treatment Pain Level (on 0 to 10) scale:   0  / 10     ASSESSMENT  Assessment/Changes in Function:   See D/C note       []  See Progress Note/Recertification   Patient will continue to benefit from skilled PT services to See D/C note     Progress toward goals / Updated goals: Mobility   Goal  CJ= 20-39%  D/C  CK= 40-59%. The severity rating is based on the FOTO Score  Goals for this certification period include and are to be achieved in  2  weeks:  1) Patient will be independent with HEP. MET  2) Patient will improve R knee AROM flexion to >100 degrees for increases ease with ADL's. MET, R knee AROM/PROM=0-102/0-112  3) Patient will improve R knee AROM extension to 0 degrees to improve heel/toe gait pattern with ambulation. MET, R knee AROM/PROM=0-102/0-112     PLAN  []  Upgrade activities as tolerated yes Continue plan of care   []  Discharge due to :    []  Other: d/c with HEP. Therapist: Emilie Rios PTA    Date: 3/24/2017 Time: 5:29 PM     No future appointments.

## 2017-03-24 NOTE — TELEPHONE ENCOUNTER
Patient is leaving to move back to [place] she needs her test scripts today before health insurance will be cancelled today.

## 2017-03-27 ENCOUNTER — APPOINTMENT (OUTPATIENT)
Dept: PHYSICAL THERAPY | Age: 67
End: 2017-03-27
Payer: MEDICARE

## 2017-03-29 ENCOUNTER — APPOINTMENT (OUTPATIENT)
Dept: PHYSICAL THERAPY | Age: 67
End: 2017-03-29
Payer: MEDICARE

## 2017-03-31 ENCOUNTER — APPOINTMENT (OUTPATIENT)
Dept: PHYSICAL THERAPY | Age: 67
End: 2017-03-31
Payer: MEDICARE

## 2017-04-05 ENCOUNTER — PATIENT OUTREACH (OUTPATIENT)
Dept: FAMILY MEDICINE CLINIC | Facility: CLINIC | Age: 67
End: 2017-04-05

## 2017-04-05 NOTE — PROGRESS NOTES
This note will not be viewable in [address] Patient admitted to [facility]  on 2/14/17-2/17/17 for  Right Total Knee Replacement. Discharge Home with [facility] on 2/17/17. Contacted patient for hospital follow up. Introduced self, role and reason for call. Verified 2 patient identifiers (Name and Date of Birth). Noted no hospitalization  admission post 30 days from discharge date 2/17/17. This episode is closed. Post Hospitalization Encounter Resolved. Patient stated \"I'm doing okay. We moved back at my home here at [facility]". No concern, needs, questions and/or assistance at this time as per patient. Opportunity to ask questions was provided. Contact information was provided for future reference, assistance, concerns, or further questions. Patient informed writer that she has chosen a PCP closer to her home. Dr. Connie Wilcox will no longer be her PCP. Writer instructed patient to call Insurance to change PCP. Patient agreed to do so.
